# Patient Record
Sex: MALE | Race: WHITE | Employment: OTHER | URBAN - METROPOLITAN AREA
[De-identification: names, ages, dates, MRNs, and addresses within clinical notes are randomized per-mention and may not be internally consistent; named-entity substitution may affect disease eponyms.]

---

## 2017-01-27 ENCOUNTER — OFFICE VISIT (OUTPATIENT)
Dept: INTERNAL MEDICINE CLINIC | Age: 77
End: 2017-01-27

## 2017-01-27 VITALS
HEIGHT: 69 IN | SYSTOLIC BLOOD PRESSURE: 138 MMHG | WEIGHT: 160 LBS | BODY MASS INDEX: 23.7 KG/M2 | DIASTOLIC BLOOD PRESSURE: 80 MMHG | HEART RATE: 64 BPM

## 2017-01-27 DIAGNOSIS — E78.2 MIXED HYPERLIPIDEMIA: ICD-10-CM

## 2017-01-27 DIAGNOSIS — M67.912 DISORDER OF LEFT ROTATOR CUFF: ICD-10-CM

## 2017-01-27 DIAGNOSIS — L82.0 SEBORRHEIC KERATOSES, INFLAMED: ICD-10-CM

## 2017-01-27 DIAGNOSIS — M17.0 PRIMARY OSTEOARTHRITIS OF BOTH KNEES: Primary | ICD-10-CM

## 2017-01-27 PROCEDURE — 1036F TOBACCO NON-USER: CPT | Performed by: INTERNAL MEDICINE

## 2017-01-27 PROCEDURE — 4040F PNEUMOC VAC/ADMIN/RCVD: CPT | Performed by: INTERNAL MEDICINE

## 2017-01-27 PROCEDURE — 99214 OFFICE O/P EST MOD 30 MIN: CPT | Performed by: INTERNAL MEDICINE

## 2017-01-27 PROCEDURE — 20610 DRAIN/INJ JOINT/BURSA W/O US: CPT | Performed by: INTERNAL MEDICINE

## 2017-01-27 PROCEDURE — 1123F ACP DISCUSS/DSCN MKR DOCD: CPT | Performed by: INTERNAL MEDICINE

## 2017-01-27 PROCEDURE — G8598 ASA/ANTIPLAT THER USED: HCPCS | Performed by: INTERNAL MEDICINE

## 2017-01-27 PROCEDURE — 17110 DESTRUCTION B9 LES UP TO 14: CPT | Performed by: INTERNAL MEDICINE

## 2017-01-27 PROCEDURE — G8427 DOCREV CUR MEDS BY ELIG CLIN: HCPCS | Performed by: INTERNAL MEDICINE

## 2017-01-27 PROCEDURE — G8484 FLU IMMUNIZE NO ADMIN: HCPCS | Performed by: INTERNAL MEDICINE

## 2017-01-27 PROCEDURE — 3017F COLORECTAL CA SCREEN DOC REV: CPT | Performed by: INTERNAL MEDICINE

## 2017-01-27 PROCEDURE — G8420 CALC BMI NORM PARAMETERS: HCPCS | Performed by: INTERNAL MEDICINE

## 2017-01-27 RX ORDER — HYDROCODONE BITARTRATE AND ACETAMINOPHEN 5; 325 MG/1; MG/1
TABLET ORAL
Qty: 60 TABLET | Refills: 0 | Status: SHIPPED | OUTPATIENT
Start: 2017-01-27 | End: 2017-04-28 | Stop reason: SDUPTHER

## 2017-01-27 RX ORDER — HYDROCODONE BITARTRATE AND ACETAMINOPHEN 5; 325 MG/1; MG/1
TABLET ORAL
Qty: 60 TABLET | Refills: 0 | Status: SHIPPED | OUTPATIENT
Start: 2017-01-27 | End: 2017-01-27 | Stop reason: SDUPTHER

## 2017-01-27 ASSESSMENT — PATIENT HEALTH QUESTIONNAIRE - PHQ9
2. FEELING DOWN, DEPRESSED OR HOPELESS: 0
1. LITTLE INTEREST OR PLEASURE IN DOING THINGS: 0
SUM OF ALL RESPONSES TO PHQ9 QUESTIONS 1 & 2: 0
SUM OF ALL RESPONSES TO PHQ QUESTIONS 1-9: 0

## 2017-03-08 RX ORDER — CLOPIDOGREL BISULFATE 75 MG/1
TABLET ORAL
Qty: 90 TABLET | Refills: 3 | Status: SHIPPED | OUTPATIENT
Start: 2017-03-08 | End: 2017-12-27 | Stop reason: SDUPTHER

## 2017-04-28 ENCOUNTER — OFFICE VISIT (OUTPATIENT)
Dept: INTERNAL MEDICINE CLINIC | Age: 77
End: 2017-04-28

## 2017-04-28 VITALS
BODY MASS INDEX: 23.7 KG/M2 | HEART RATE: 72 BPM | HEIGHT: 69 IN | SYSTOLIC BLOOD PRESSURE: 156 MMHG | WEIGHT: 160 LBS | DIASTOLIC BLOOD PRESSURE: 80 MMHG

## 2017-04-28 DIAGNOSIS — R25.1 TREMOR: Primary | ICD-10-CM

## 2017-04-28 DIAGNOSIS — G89.29 OTHER CHRONIC PAIN: ICD-10-CM

## 2017-04-28 PROCEDURE — 4040F PNEUMOC VAC/ADMIN/RCVD: CPT | Performed by: INTERNAL MEDICINE

## 2017-04-28 PROCEDURE — 99214 OFFICE O/P EST MOD 30 MIN: CPT | Performed by: INTERNAL MEDICINE

## 2017-04-28 PROCEDURE — 1036F TOBACCO NON-USER: CPT | Performed by: INTERNAL MEDICINE

## 2017-04-28 PROCEDURE — G8598 ASA/ANTIPLAT THER USED: HCPCS | Performed by: INTERNAL MEDICINE

## 2017-04-28 PROCEDURE — G8428 CUR MEDS NOT DOCUMENT: HCPCS | Performed by: INTERNAL MEDICINE

## 2017-04-28 PROCEDURE — 1123F ACP DISCUSS/DSCN MKR DOCD: CPT | Performed by: INTERNAL MEDICINE

## 2017-04-28 PROCEDURE — G8420 CALC BMI NORM PARAMETERS: HCPCS | Performed by: INTERNAL MEDICINE

## 2017-04-28 RX ORDER — HYDROCODONE BITARTRATE AND ACETAMINOPHEN 5; 325 MG/1; MG/1
TABLET ORAL
Qty: 60 TABLET | Refills: 0 | Status: SHIPPED | OUTPATIENT
Start: 2017-04-28 | End: 2017-04-28 | Stop reason: SDUPTHER

## 2017-04-28 RX ORDER — HYDROCODONE BITARTRATE AND ACETAMINOPHEN 5; 325 MG/1; MG/1
TABLET ORAL
Qty: 60 TABLET | Refills: 0 | Status: SHIPPED | OUTPATIENT
Start: 2017-04-28 | End: 2017-07-21 | Stop reason: SDUPTHER

## 2017-05-02 ENCOUNTER — HOSPITAL ENCOUNTER (OUTPATIENT)
Dept: OTHER | Age: 77
Discharge: HOME | End: 2017-05-02
Attending: NURSE PRACTITIONER | Admitting: NURSE PRACTITIONER

## 2017-05-02 ENCOUNTER — TELEPHONE (OUTPATIENT)
Dept: CARDIOLOGY CLINIC | Age: 77
End: 2017-05-02

## 2017-05-02 DIAGNOSIS — I25.10 CORONARY ARTERY DISEASE INVOLVING NATIVE CORONARY ARTERY OF NATIVE HEART WITHOUT ANGINA PECTORIS: Chronic | ICD-10-CM

## 2017-05-02 LAB
ALT SERPL-CCNC: 7 U/L (ref 10–40)
ANION GAP SERPL CALCULATED.3IONS-SCNC: 13 MMOL/L (ref 3–16)
AST SERPL-CCNC: 11 U/L (ref 15–37)
BUN BLDV-MCNC: 15 MG/DL (ref 7–20)
CALCIUM SERPL-MCNC: 8.9 MG/DL (ref 8.3–10.6)
CHLORIDE BLD-SCNC: 102 MMOL/L (ref 99–110)
CHOLESTEROL, TOTAL: 133 MG/DL (ref 0–199)
CO2: 24 MMOL/L (ref 21–32)
CREAT SERPL-MCNC: 0.9 MG/DL (ref 0.8–1.3)
GFR AFRICAN AMERICAN: >60
GFR NON-AFRICAN AMERICAN: >60
GLUCOSE BLD-MCNC: 90 MG/DL (ref 70–99)
HDLC SERPL-MCNC: 43 MG/DL (ref 40–60)
LDL CHOLESTEROL CALCULATED: 73 MG/DL
POTASSIUM SERPL-SCNC: 3.7 MMOL/L (ref 3.5–5.1)
SODIUM BLD-SCNC: 139 MMOL/L (ref 136–145)
TRIGL SERPL-MCNC: 86 MG/DL (ref 0–150)
VLDLC SERPL CALC-MCNC: 17 MG/DL

## 2017-05-04 ENCOUNTER — OFFICE VISIT (OUTPATIENT)
Dept: CARDIOLOGY CLINIC | Age: 77
End: 2017-05-04

## 2017-05-04 VITALS
BODY MASS INDEX: 23.19 KG/M2 | HEIGHT: 70 IN | DIASTOLIC BLOOD PRESSURE: 98 MMHG | SYSTOLIC BLOOD PRESSURE: 148 MMHG | WEIGHT: 162 LBS | HEART RATE: 80 BPM

## 2017-05-04 DIAGNOSIS — I10 ESSENTIAL HYPERTENSION: ICD-10-CM

## 2017-05-04 DIAGNOSIS — E78.5 HYPERLIPIDEMIA, UNSPECIFIED HYPERLIPIDEMIA TYPE: ICD-10-CM

## 2017-05-04 DIAGNOSIS — I25.10 CORONARY ARTERY DISEASE INVOLVING NATIVE CORONARY ARTERY OF NATIVE HEART WITHOUT ANGINA PECTORIS: Primary | ICD-10-CM

## 2017-05-04 DIAGNOSIS — E78.2 MIXED HYPERLIPIDEMIA: ICD-10-CM

## 2017-05-04 PROCEDURE — G8427 DOCREV CUR MEDS BY ELIG CLIN: HCPCS | Performed by: INTERNAL MEDICINE

## 2017-05-04 PROCEDURE — 99214 OFFICE O/P EST MOD 30 MIN: CPT | Performed by: INTERNAL MEDICINE

## 2017-05-04 PROCEDURE — 1123F ACP DISCUSS/DSCN MKR DOCD: CPT | Performed by: INTERNAL MEDICINE

## 2017-05-04 PROCEDURE — G8598 ASA/ANTIPLAT THER USED: HCPCS | Performed by: INTERNAL MEDICINE

## 2017-05-04 PROCEDURE — 4040F PNEUMOC VAC/ADMIN/RCVD: CPT | Performed by: INTERNAL MEDICINE

## 2017-05-04 PROCEDURE — 1036F TOBACCO NON-USER: CPT | Performed by: INTERNAL MEDICINE

## 2017-05-04 PROCEDURE — G8420 CALC BMI NORM PARAMETERS: HCPCS | Performed by: INTERNAL MEDICINE

## 2017-06-28 ENCOUNTER — TELEPHONE (OUTPATIENT)
Dept: INTERNAL MEDICINE CLINIC | Age: 77
End: 2017-06-28

## 2017-06-30 ENCOUNTER — OFFICE VISIT (OUTPATIENT)
Dept: INTERNAL MEDICINE CLINIC | Age: 77
End: 2017-06-30

## 2017-06-30 VITALS
DIASTOLIC BLOOD PRESSURE: 82 MMHG | BODY MASS INDEX: 21.95 KG/M2 | SYSTOLIC BLOOD PRESSURE: 140 MMHG | WEIGHT: 153 LBS | HEART RATE: 76 BPM

## 2017-06-30 DIAGNOSIS — M17.0 PRIMARY OSTEOARTHRITIS OF BOTH KNEES: ICD-10-CM

## 2017-06-30 DIAGNOSIS — M25.061 HEMARTHROSIS OF KNEE, RIGHT: Primary | ICD-10-CM

## 2017-06-30 PROCEDURE — 99213 OFFICE O/P EST LOW 20 MIN: CPT | Performed by: INTERNAL MEDICINE

## 2017-06-30 PROCEDURE — G8598 ASA/ANTIPLAT THER USED: HCPCS | Performed by: INTERNAL MEDICINE

## 2017-06-30 PROCEDURE — 1123F ACP DISCUSS/DSCN MKR DOCD: CPT | Performed by: INTERNAL MEDICINE

## 2017-06-30 PROCEDURE — 20610 DRAIN/INJ JOINT/BURSA W/O US: CPT | Performed by: INTERNAL MEDICINE

## 2017-06-30 PROCEDURE — 1036F TOBACCO NON-USER: CPT | Performed by: INTERNAL MEDICINE

## 2017-06-30 PROCEDURE — G8428 CUR MEDS NOT DOCUMENT: HCPCS | Performed by: INTERNAL MEDICINE

## 2017-06-30 PROCEDURE — 4040F PNEUMOC VAC/ADMIN/RCVD: CPT | Performed by: INTERNAL MEDICINE

## 2017-06-30 PROCEDURE — G8420 CALC BMI NORM PARAMETERS: HCPCS | Performed by: INTERNAL MEDICINE

## 2017-07-21 ENCOUNTER — OFFICE VISIT (OUTPATIENT)
Dept: INTERNAL MEDICINE CLINIC | Age: 77
End: 2017-07-21

## 2017-07-21 VITALS
WEIGHT: 151 LBS | SYSTOLIC BLOOD PRESSURE: 120 MMHG | DIASTOLIC BLOOD PRESSURE: 64 MMHG | BODY MASS INDEX: 21.62 KG/M2 | HEART RATE: 72 BPM | HEIGHT: 70 IN

## 2017-07-21 DIAGNOSIS — G89.29 OTHER CHRONIC PAIN: ICD-10-CM

## 2017-07-21 DIAGNOSIS — M17.11 PRIMARY OSTEOARTHRITIS OF RIGHT KNEE: Primary | ICD-10-CM

## 2017-07-21 DIAGNOSIS — I10 ESSENTIAL HYPERTENSION: ICD-10-CM

## 2017-07-21 PROCEDURE — 4040F PNEUMOC VAC/ADMIN/RCVD: CPT | Performed by: INTERNAL MEDICINE

## 2017-07-21 PROCEDURE — G8598 ASA/ANTIPLAT THER USED: HCPCS | Performed by: INTERNAL MEDICINE

## 2017-07-21 PROCEDURE — 99214 OFFICE O/P EST MOD 30 MIN: CPT | Performed by: INTERNAL MEDICINE

## 2017-07-21 PROCEDURE — G8420 CALC BMI NORM PARAMETERS: HCPCS | Performed by: INTERNAL MEDICINE

## 2017-07-21 PROCEDURE — 1036F TOBACCO NON-USER: CPT | Performed by: INTERNAL MEDICINE

## 2017-07-21 PROCEDURE — G8427 DOCREV CUR MEDS BY ELIG CLIN: HCPCS | Performed by: INTERNAL MEDICINE

## 2017-07-21 PROCEDURE — 1123F ACP DISCUSS/DSCN MKR DOCD: CPT | Performed by: INTERNAL MEDICINE

## 2017-07-21 RX ORDER — HYDROCODONE BITARTRATE AND ACETAMINOPHEN 5; 325 MG/1; MG/1
TABLET ORAL
Qty: 60 TABLET | Refills: 0 | Status: SHIPPED | OUTPATIENT
Start: 2017-07-21 | End: 2017-07-21 | Stop reason: SDUPTHER

## 2017-07-21 RX ORDER — HYDROCODONE BITARTRATE AND ACETAMINOPHEN 5; 325 MG/1; MG/1
TABLET ORAL
Qty: 60 TABLET | Refills: 0 | Status: SHIPPED | OUTPATIENT
Start: 2017-07-21 | End: 2017-10-20 | Stop reason: SDUPTHER

## 2017-08-15 ENCOUNTER — OFFICE VISIT (OUTPATIENT)
Dept: INTERNAL MEDICINE CLINIC | Age: 77
End: 2017-08-15

## 2017-08-15 VITALS
BODY MASS INDEX: 21.33 KG/M2 | SYSTOLIC BLOOD PRESSURE: 120 MMHG | DIASTOLIC BLOOD PRESSURE: 62 MMHG | WEIGHT: 149 LBS | HEIGHT: 70 IN | HEART RATE: 64 BPM

## 2017-08-15 DIAGNOSIS — M25.061 HEMARTHROSIS INVOLVING KNEE JOINT, RIGHT: Primary | ICD-10-CM

## 2017-08-15 PROCEDURE — G8598 ASA/ANTIPLAT THER USED: HCPCS | Performed by: INTERNAL MEDICINE

## 2017-08-15 PROCEDURE — G8428 CUR MEDS NOT DOCUMENT: HCPCS | Performed by: INTERNAL MEDICINE

## 2017-08-15 PROCEDURE — 4040F PNEUMOC VAC/ADMIN/RCVD: CPT | Performed by: INTERNAL MEDICINE

## 2017-08-15 PROCEDURE — 20610 DRAIN/INJ JOINT/BURSA W/O US: CPT | Performed by: INTERNAL MEDICINE

## 2017-08-15 PROCEDURE — G8420 CALC BMI NORM PARAMETERS: HCPCS | Performed by: INTERNAL MEDICINE

## 2017-08-15 PROCEDURE — 99213 OFFICE O/P EST LOW 20 MIN: CPT | Performed by: INTERNAL MEDICINE

## 2017-08-15 PROCEDURE — 1036F TOBACCO NON-USER: CPT | Performed by: INTERNAL MEDICINE

## 2017-08-15 PROCEDURE — 1123F ACP DISCUSS/DSCN MKR DOCD: CPT | Performed by: INTERNAL MEDICINE

## 2017-08-22 ENCOUNTER — OFFICE VISIT (OUTPATIENT)
Dept: ORTHOPEDIC SURGERY | Age: 77
End: 2017-08-22

## 2017-08-22 VITALS
BODY MASS INDEX: 20.9 KG/M2 | HEART RATE: 91 BPM | DIASTOLIC BLOOD PRESSURE: 83 MMHG | SYSTOLIC BLOOD PRESSURE: 112 MMHG | WEIGHT: 146 LBS | HEIGHT: 70 IN

## 2017-08-22 DIAGNOSIS — M17.11 PRIMARY OSTEOARTHRITIS OF RIGHT KNEE: Primary | ICD-10-CM

## 2017-08-22 PROCEDURE — 1036F TOBACCO NON-USER: CPT | Performed by: ORTHOPAEDIC SURGERY

## 2017-08-22 PROCEDURE — G8427 DOCREV CUR MEDS BY ELIG CLIN: HCPCS | Performed by: ORTHOPAEDIC SURGERY

## 2017-08-22 PROCEDURE — G8598 ASA/ANTIPLAT THER USED: HCPCS | Performed by: ORTHOPAEDIC SURGERY

## 2017-08-22 PROCEDURE — G8420 CALC BMI NORM PARAMETERS: HCPCS | Performed by: ORTHOPAEDIC SURGERY

## 2017-08-22 PROCEDURE — 73564 X-RAY EXAM KNEE 4 OR MORE: CPT | Performed by: ORTHOPAEDIC SURGERY

## 2017-08-22 PROCEDURE — 4040F PNEUMOC VAC/ADMIN/RCVD: CPT | Performed by: ORTHOPAEDIC SURGERY

## 2017-08-22 PROCEDURE — 1123F ACP DISCUSS/DSCN MKR DOCD: CPT | Performed by: ORTHOPAEDIC SURGERY

## 2017-08-22 PROCEDURE — 99203 OFFICE O/P NEW LOW 30 MIN: CPT | Performed by: ORTHOPAEDIC SURGERY

## 2017-08-22 NOTE — MR AVS SNAPSHOT
After Visit Summary             Mychal Rai   2017 1:00 PM   Office Visit    Description:  Male : 1940   Provider:  Prosper Watson MD   Department:  [facility] and Future Appointments         Below is a list of your follow-up and future appointments. This may not be a complete list as you may have made appointments directly with providers that we are not aware of or your providers may have made some for you. Please call your providers to confirm appointments. It is important to keep your appointments. Please bring your current insurance card, photo ID, co-pay, and all medication bottles to your appointment. If self-pay, payment is expected at the time of service. Your To-Do List     Future Appointments Provider Department Dept Phone    10/20/2017 8:40 AM Thu Mike MD [facility] [phone]    Please arrive 15 minutes prior to appointment, bring photo ID and insurance card. Information from Your Visit        Department     Name Address Phone Fax    [address] [facility] [address]  [address] [address]  [address]  [id number]      You Were Seen for:         Comments    Chronic pain of right knee   [[id number]]         Vital Signs     Blood Pressure Pulse Height Weight Body Mass Index Smoking Status    112/83 91 5' 10\" (1.778 m) 146 lb (66.2 kg) 20.95 kg/m2 Former Smoker         Medications and Orders      Your Current Medications Are              HYDROcodone-acetaminophen (NORCO) 5-325 MG per tablet One tab PO Q8H prn severe pain. Williams Mccormack clopidogrel (PLAVIX) 75 MG tablet TAKE 1 TABLET BY MOUTH EVERY DAY    simvastatin (ZOCOR) 20 MG tablet TAKE 1 TABLET BY MOUTH EVERY EVENING    aspirin 81 MG EC tablet Take 81 mg by mouth daily.         Allergies           No Known Allergies      We Ordered/Performed the following           XR Knee Bilateral Standing     Comments:    Room 7 Additional Information  If you have questions, please contact the physician practice where you receive care. Remember, MyChart is NOT to be used for urgent needs. For medical emergencies, dial 911. For questions regarding your MyChart account call [phone]. If you have a clinical question, please call your doctor's office.

## 2017-10-01 NOTE — PROGRESS NOTES
Date of Encounter: 8/22/2017  [facility] Record Number: [id number]    Chief Complaint   Patient presents with    Knee Pain     right knee pain for years       History of Present Illness:  Marcus Gandhi is a 69 y.o. male here for evaluation of his  right knee. His current symptoms are described below and I reviewed them with him today. He recently underwent a cortisone injection and had his knee drained last week. That seemed to help his pain and bring down to the current level. He's had no joint fluid injections. He does use hydrocodone on her intermittent basis to help with his pain control. He denies any recent falls but the knee does occasionally give out. He feels a grinding sensation especially when he gets up and down from a chair and when he is walking. He is on Plavix due to atrial fibrillation, and history of cardiac stent. He denies fevers or chills. No numbness or tingling that radiates down the leg.     Pain Assessment  Location of Pain: Knee  Location Modifiers: Right  Severity of Pain: 3  Quality of Pain: Aching  Duration of Pain: Persistent  Frequency of Pain: Constant  Date Pain First Started: 08/22/14  Aggravating Factors: Walking, Kneeling, Squatting  Limiting Behavior: Some  Relieving Factors: Nsaids  Result of Injury: No  Work-Related Injury: No  Are there other pain locations you wish to document?: No    Past Medical History:   Diagnosis Date    CAD (coronary artery disease)     [facility]     Coronary artery bypass 1998    Coronary stent 2003    2    Hyperlipidemia     Hypertension     Osteoarthritis     [facility] 2004    Rotator cuff syndrome        Past Surgical History:   Procedure Laterality Date    CARDIAC SURGERY  1998    CATARACT REMOVAL WITH IMPLANT  2015    COLONOSCOPY  2005    CORONARY ANGIOPLASTY  1993    x 2    CORONARY ANGIOPLASTY  2013    x 1 stent    CORONARY ARTERY BYPASS GRAFT 1998, barrett    PROSTATECTOMY  2004    Cured, dr Lili Martinez       Current Outpatient Prescriptions   Medication Sig Dispense Refill    HYDROcodone-acetaminophen ([address]) 5-325 MG per tablet One tab PO Q8H prn severe pain. . 60 tablet 0    clopidogrel (PLAVIX) 75 MG tablet TAKE 1 TABLET BY MOUTH EVERY DAY 90 tablet 3    simvastatin (ZOCOR) 20 MG tablet TAKE 1 TABLET BY MOUTH EVERY EVENING 90 tablet 3    aspirin 81 MG EC tablet Take 81 mg by mouth daily. No current facility-administered medications for this visit. allergies, social and family histories were reviewed and updated as appropriate. Review of Systems:  Relevant review of systems reviewed and available in the patient's chart    Vital Signs:  /83  Pulse 91  Ht 5' 10\" (1.778 m)  Wt 146 lb (66.2 kg)  BMI 20.95 kg/m2    General Exam:   Constitutional: He is adequately groomed with no evidence of malnutrition  Mental Status: He is oriented to time, place and person. Normal mentation and affect for age. Lymphatic: The lymphatic examination bilaterally reveals all areas to be without enlargement or induration. Vascular: Examination reveals no swelling or calf tenderness. Peripheral pulses are palpable and 2+. Neurological: He has good coordination. There is no focal weakness or sensory deficit. Right Knee Examination:    Inspection:  Both knees show a tendency towards a valgus habitus. Right knee shows trace effusion with no erythema. No scars or abrasions. Palpation:  Right knee shows mild tenderness along the joint line more laterally than medially. There is palpable crepitation noted with range of motion. Range of Motion:  He can achieve full extension although the last 5° are uncomfortable, flexion becomes painful around 115° limiting motion. Strength:  Quad 4+ / 5  ; Hamstrings 4+ / 5.   Gross motor to hip and ankle intact 4+ to 5/5    Special Tests:      negative anterior drawer    no posterior drawer    does not open to valgus or varus stress at 0 or 30°    negative Homans    Posterior tibial pulses are +2/4 capillary refill is brisk sensation is intact. Skin: There are no rashes, ulcerations or lesions. Gait: He ambulates with a limp when he 1st gets up from a chair and utilizes both hands to help get up from a chair. Radiology:     X-rays obtained today and reviewed in office:  Views 4 Right  Knee with comparison AP, flexion PA and skyline views of the left knee  Impression No evidence for acute fracture or subluxation / dislocation. Both knees show bone-on-bone arthritic changes primarily in the lateral compartment with tricompartmental osteophyte formation. No lytic or blastic lesions. Both knees show a mild valgus habitus. Impression:  Encounter Diagnosis   Name Primary?  Primary osteoarthritis of right knee Yes       Office Procedures:  Orders Placed This Encounter   Procedures    XR Knee Bilateral Standing     Room 7     Order Specific Question:   Reason for exam:     Answer:   Knee Pain    XR Knee Right Standard     3V Rt Knee AP Standing     Order Specific Question:   Reason for exam:     Answer:   Knee Pain       Treatment Plan:   We discussed treatment options. At this point he seems to be getting good benefit from his current cortisone injection and arthrocentesis. We discussed the option of Visco supplementation once the cortisone injection wears off. He will give us a call when this occurs and we will get him in for aspiration and Synvisc 1 injection. Any narcotic medications for this will need to continue to come from his primary care physician.     Gabe Van understands and accepts this course of care

## 2017-10-20 ENCOUNTER — OFFICE VISIT (OUTPATIENT)
Dept: INTERNAL MEDICINE CLINIC | Age: 77
End: 2017-10-20

## 2017-10-20 VITALS
BODY MASS INDEX: 21.38 KG/M2 | WEIGHT: 149 LBS | DIASTOLIC BLOOD PRESSURE: 60 MMHG | HEART RATE: 64 BPM | SYSTOLIC BLOOD PRESSURE: 122 MMHG

## 2017-10-20 DIAGNOSIS — R13.10 DYSPHAGIA, UNSPECIFIED TYPE: Primary | ICD-10-CM

## 2017-10-20 DIAGNOSIS — J42 CHRONIC BRONCHITIS, UNSPECIFIED CHRONIC BRONCHITIS TYPE (HCC): ICD-10-CM

## 2017-10-20 DIAGNOSIS — G89.4 CHRONIC PAIN SYNDROME: ICD-10-CM

## 2017-10-20 DIAGNOSIS — I10 ESSENTIAL HYPERTENSION: ICD-10-CM

## 2017-10-20 PROCEDURE — G8420 CALC BMI NORM PARAMETERS: HCPCS | Performed by: INTERNAL MEDICINE

## 2017-10-20 PROCEDURE — G8427 DOCREV CUR MEDS BY ELIG CLIN: HCPCS | Performed by: INTERNAL MEDICINE

## 2017-10-20 PROCEDURE — G8598 ASA/ANTIPLAT THER USED: HCPCS | Performed by: INTERNAL MEDICINE

## 2017-10-20 PROCEDURE — 1123F ACP DISCUSS/DSCN MKR DOCD: CPT | Performed by: INTERNAL MEDICINE

## 2017-10-20 PROCEDURE — 3023F SPIROM DOC REV: CPT | Performed by: INTERNAL MEDICINE

## 2017-10-20 PROCEDURE — 1036F TOBACCO NON-USER: CPT | Performed by: INTERNAL MEDICINE

## 2017-10-20 PROCEDURE — G8484 FLU IMMUNIZE NO ADMIN: HCPCS | Performed by: INTERNAL MEDICINE

## 2017-10-20 PROCEDURE — G8926 SPIRO NO PERF OR DOC: HCPCS | Performed by: INTERNAL MEDICINE

## 2017-10-20 PROCEDURE — 99214 OFFICE O/P EST MOD 30 MIN: CPT | Performed by: INTERNAL MEDICINE

## 2017-10-20 PROCEDURE — 4040F PNEUMOC VAC/ADMIN/RCVD: CPT | Performed by: INTERNAL MEDICINE

## 2017-10-20 RX ORDER — HYDROCODONE BITARTRATE AND ACETAMINOPHEN 5; 325 MG/1; MG/1
TABLET ORAL
Qty: 60 TABLET | Refills: 0 | Status: SHIPPED | OUTPATIENT
Start: 2017-10-20 | End: 2018-01-19 | Stop reason: SDUPTHER

## 2017-10-20 RX ORDER — HYDROCODONE BITARTRATE AND ACETAMINOPHEN 5; 325 MG/1; MG/1
TABLET ORAL
Qty: 60 TABLET | Refills: 0 | Status: SHIPPED | OUTPATIENT
Start: 2017-10-20 | End: 2017-10-20 | Stop reason: SDUPTHER

## 2017-10-20 NOTE — PROGRESS NOTES
Chief Complaint   Patient presents with    Dysphagia     chokes when he eats and food gets stuck     Chronic Pain       Carlota Patrick 69 y.o. male is here for follow-up of Chronic pain, hyperlipidemia and arthritis    He is generally well satisfied with his level of pain control    Today he complains of problems with swallowing. This occurs more with solids and liquids    Apparently he had some type of evaluation done around 7 years ago, it sounds like he may have seen a speech pathologist then since he was advised to use a straw and there were several other recommendations made. I reviewed his current record and I don't see previous swelling of evaluation    His symptoms are worsening, there are no other associated symptoms or modifying factors    He has a chronic tremor in his right arm which he believes is getting worse   Current Outpatient Prescriptions on File Prior to Visit   Medication Sig Dispense Refill    clopidogrel (PLAVIX) 75 MG tablet TAKE 1 TABLET BY MOUTH EVERY DAY 90 tablet 3    simvastatin (ZOCOR) 20 MG tablet TAKE 1 TABLET BY MOUTH EVERY EVENING 90 tablet 3    aspirin 81 MG EC tablet Take 81 mg by mouth daily. No current facility-administered medications on file prior to visit.         Past Medical History:   Diagnosis Date    CAD (coronary artery disease)     [facility]     Coronary artery bypass 1998    Coronary stent 2003    2    Hyperlipidemia     Hypertension     Osteoarthritis     [facility]    Rotator cuff syndrome            /60   Pulse 64   Wt 149 lb (67.6 kg)   BMI 21.38 kg/m²     General Appearance:  Alert, cooperative, no distress, appears stated age   Head:  Normocephalic, without obvious abnormality, atraumatic   Neck: Supple, symmetrical, trachea midline, no adenopathy, thyroid: not enlarged, symmetric, no tenderness/mass/nodules, no carotid bruit or JVD   Lungs:   Clear to auscultation

## 2017-10-24 ENCOUNTER — TELEPHONE (OUTPATIENT)
Dept: INTERNAL MEDICINE CLINIC | Age: 77
End: 2017-10-24

## 2017-10-24 RX ORDER — AZITHROMYCIN 250 MG/1
TABLET, FILM COATED ORAL
Qty: 1 PACKET | Refills: 0 | Status: SHIPPED | OUTPATIENT
Start: 2017-10-24 | End: 2017-11-03

## 2017-10-24 NOTE — TELEPHONE ENCOUNTER
Wife calling-pt started with sore throat yesterday also pain in his right arm. Sore throat and pain are worse today. Arm pain is not new but worse than usual.    Wife state he usually get a zpak and it takes care of it.     [facility] [phone]

## 2017-11-10 ENCOUNTER — TELEPHONE (OUTPATIENT)
Dept: CARDIOLOGY CLINIC | Age: 77
End: 2017-11-10

## 2017-11-10 NOTE — TELEPHONE ENCOUNTER
CARDIAC CLEARANCE     What type of procedure are you having? EGD    Which physician is performing your procedure? Dr. Elba Earl    When is your procedure scheduled for? 11/28/17    Where are you having this procedure? [facility]    Are you taking Blood Thinners? yes   If so what? (Name/dose/frequesncy)clopidogrel (PLAVIX) 75 MG tablet     Does the surgeon want you to stop your blood thinner?   If so for how long? 7 day hold prior    Phone Number and Contact Name for Physicians office:  Kylie Earl  Fax number to send information:  [phone]

## 2017-11-15 ENCOUNTER — TELEPHONE (OUTPATIENT)
Dept: CARDIOLOGY CLINIC | Age: 77
End: 2017-11-15

## 2017-11-15 NOTE — TELEPHONE ENCOUNTER
Pt wife calling needs to know if pt needs to hold ASA prior to EDG on 11/28/17? If so, how many days prior?  Pls call to advise Thank you

## 2017-11-28 ENCOUNTER — HOSPITAL ENCOUNTER (OUTPATIENT)
Dept: ENDOSCOPY | Age: 77
Discharge: HOME | End: 2017-11-28
Attending: INTERNAL MEDICINE | Admitting: INTERNAL MEDICINE

## 2017-11-28 ASSESSMENT — COPD QUESTIONNAIRES: CAT_SEVERITY: MILD

## 2017-11-28 ASSESSMENT — ENCOUNTER SYMPTOMS: SHORTNESS OF BREATH: 1

## 2017-11-28 NOTE — ANESTHESIA PRE-OP
syndrome        Past Surgical History:        Procedure Laterality Date    CARDIAC SURGERY  1998    CATARACT REMOVAL WITH IMPLANT  2015    COLONOSCOPY  2005   Tito [address]    x 2    CORONARY ANGIOPLASTY  2013    x 1 stent    CORONARY ARTERY BYPASS GRAFT      1998, barrett    PROSTATECTOMY  2004    Cured, dr Joanne Branch       Social History:    Social History   Substance Use Topics    Smoking status: Former Smoker     Packs/day: 2.00     Years: 30.00     Quit date: 5/15/1983    Smokeless tobacco: Never Used    Alcohol use No                                Counseling given: Not Answered      Vital Signs (Current): There were no vitals filed for this visit. BP Readings from Last 3 Encounters:   10/20/17 122/60   08/22/17 112/83   08/15/17 120/62       NPO Status:                                                                                 BMI:   Wt Readings from Last 3 Encounters:   11/14/17 148 lb (67.1 kg)   10/20/17 149 lb (67.6 kg)   08/22/17 146 lb (66.2 kg)     There is no height or weight on file to calculate BMI.     Anesthesia Evaluation  Patient summary reviewed and Nursing notes reviewed  Airway: Mallampati: II  TM distance: >3 FB   Neck ROM: limited  Mouth opening: > = 3 FB Dental:    (+) other  Comment: Missing lower incisors    Pulmonary:   (+) COPD: mild,  shortness of breath: chronic,                             Cardiovascular:  Exercise tolerance: good (>4 METS),   (+) hypertension:, CAD: non-obstructive,     (-)  LORENZ      Rhythm: regular                   ROS comment: See stress test result     Neuro/Psych:               GI/Hepatic/Renal:             Endo/Other:                     Abdominal:           Vascular:                                      Anesthesia Plan      MAC     ASA 3     (ED to Hosp-Admission  Discharged      5/18/2015 - 5/20/2015 (2 days)  Lam Ruiz MD   Last attending  Treatment team  SOB (shortness of breath)   Principal problem   Discharge Summaries   Ana Whiting MD (Physician) [facility]        PATIENT NAME:                 PA #:            MR Chase Valera                   [id number]       [id number]            ATTENDING PHYSICIAN:                  ADM DATE:   DIS DATE:          Radha Murray MD              05/18/2015 05/20/2015         DATE OF BIRTH:   AGE:           PATIENT TYPE:                         1940       75             IPF                                      FINAL DIAGNOSES:    1. Chest pain, undetermined etiology. 2.  Shortness of breath secondary to chronic obstructive pulmonary disease. 3.  Coronary artery disease. 4.  Hypertension. 5.  Hyperlipidemia. DISCHARGE MEDICATIONS:  Include:  1. Protonix 40 mg daily. 2.  Zocor 20 mg daily. 3.  Hydrocodone/acetaminophen 5/325 every 6 hours as needed for severe pain. 4.  Plavix 75 mg daily. 5.  Aspirin 81 mg daily. HOSPITAL COURSE:  The patient presented to the hospital with vague  retrosternal chest discomfort. The patient had a great deal of difficulty  describing the nature of the pain. He complained of severe shortness of  breath. He had a minimally elevated troponin on admission. Therefore, it  was felt that the patient should have a workup to rule out an acute coronary  event. The patient had a CT pulmonary angiogram to make sure he did not have  pulmonary emboli causing his pain. This was negative. The cardiac enzymes  never became particularly high. He had an echocardiogram performed which  showed normal left ventricle size, wall thickness, and systolic function with  an ejection fraction of 55%. He did have some impaired left ventricular  relaxation. He had some trivial mitral regurgitation. He had an aneurysmal  interatrial septum with no evidence of shunting. The patient had trivial  tricuspid regurgitation.   He was seen in consultation by

## 2017-12-13 ENCOUNTER — HOSPITAL ENCOUNTER (OUTPATIENT)
Dept: GENERAL RADIOLOGY | Age: 77
Discharge: HOME | End: 2017-12-13
Admitting: INTERNAL MEDICINE

## 2017-12-13 DIAGNOSIS — R13.10 PROBLEMS WITH SWALLOWING AND MASTICATION: ICD-10-CM

## 2017-12-13 DIAGNOSIS — R13.10 DYSPHAGIA: ICD-10-CM

## 2017-12-13 NOTE — PROCEDURES
[address]  MODIFIED BARIUM SWALLOW EVALUATION    Patient's Name: Edin Morales. O.B: 1940  Medical Diagnosis: Problems with swallowing and mastication [R13.10]  Treatment Diagnosis: Dysphagia    Ordering MD: Dr. Angeli Ashley  Radiologist: Dr. Nicole Jacobo  Date of Evaluation: 12/13/2017  Type of Study: Modified Barium Swallowing Study (MBS)  Diet Prior to Study:  Regular texture diet with thin liquids  Pain Level: Pt did not report pain    Impression:  Modified Barium Swallow evaluation completed on 12/13/2017. Results indicate moderate-severe oropharyngeal dysphagia characterized by prolonged mastication, reduced A-P propulsion, premature bolus loss to pharynx, delayed swallow initiation, reduced hyolaryngeal excursion, reduced/absent epiglottic movement, reduced pharyngeal clearing, and GROSS aspiration of thin liquids and nectar thick liquids. Thin liquids via cup revealed rapid bolus loss to pyriforms, largely ABSENT epiglottic distension resulting in significantly reduced airway protection with direct entry into airway. A cough reflex was elicited with gross aspiration episode. Nectar thick liquids via cup revealed use of multiple swallows with one instance of aspiration during the swallow. With use of chin tuck strategy, mild laryngeal penetration was noted with nectar thick liquids. No aspiration or penetration was viewed with honey thick liquids, however vallecular residue was observed post swallow. Reduced pharyngeal clearing was observed across all textures, suspected due to significantly reduced epiglottic distension and epiglottis making contact with post-pharyngeal wall. Given verbal cues to use chin tuck with second dry swallow, this appeared to assist with pharyngeal clearing however did not eliminate residue. Solid textures revealed prolonged mastication with piecemeal swallows, again reduced pharyngeal clearing was observed with coating noted on post-pharyngeal wall.   At attempts to clear pharyngeal residue     Esophageal Phase  Unremarkable    Following Evaluation:  Results/recommendations and education given to Patient and his wife, who verbalized understanding    Timed Code Treatment: 0 minutes    Total Treatment Time: 40 minutes    Toma Phillips M.A., [address] [place] [address]  Speech-Language Pathologist

## 2017-12-14 ENCOUNTER — HOSPITAL ENCOUNTER (OUTPATIENT)
Dept: ENDOSCOPY | Age: 77
Discharge: HOME | End: 2017-12-14
Attending: INTERNAL MEDICINE | Admitting: INTERNAL MEDICINE

## 2017-12-27 ENCOUNTER — TELEPHONE (OUTPATIENT)
Dept: CARDIOLOGY CLINIC | Age: 77
End: 2017-12-27

## 2017-12-27 DIAGNOSIS — I25.10 CORONARY ARTERY DISEASE INVOLVING NATIVE CORONARY ARTERY OF NATIVE HEART WITHOUT ANGINA PECTORIS: Chronic | ICD-10-CM

## 2017-12-27 DIAGNOSIS — I10 ESSENTIAL HYPERTENSION: Chronic | ICD-10-CM

## 2017-12-27 DIAGNOSIS — E78.5 HYPERLIPIDEMIA, UNSPECIFIED HYPERLIPIDEMIA TYPE: Primary | Chronic | ICD-10-CM

## 2017-12-27 DIAGNOSIS — E78.5 HYPERLIPIDEMIA, UNSPECIFIED HYPERLIPIDEMIA TYPE: Chronic | ICD-10-CM

## 2017-12-27 RX ORDER — SIMVASTATIN 20 MG
20 TABLET ORAL NIGHTLY
Qty: 90 TABLET | Refills: 3 | Status: SHIPPED | OUTPATIENT
Start: 2017-12-27 | End: 2019-01-02 | Stop reason: SDUPTHER

## 2017-12-27 RX ORDER — CLOPIDOGREL BISULFATE 75 MG/1
75 TABLET ORAL DAILY
Qty: 90 TABLET | Refills: 3 | Status: SHIPPED | OUTPATIENT
Start: 2017-12-27 | End: 2019-01-02

## 2017-12-27 RX ORDER — SIMVASTATIN 20 MG
20 TABLET ORAL NIGHTLY
Qty: 90 TABLET | Refills: 3 | Status: SHIPPED | OUTPATIENT
Start: 2017-12-27 | End: 2017-12-27 | Stop reason: SDUPTHER

## 2017-12-27 NOTE — TELEPHONE ENCOUNTER
Pt calling to adv that all his prescription should now be sent to [facility]

## 2018-01-19 ENCOUNTER — OFFICE VISIT (OUTPATIENT)
Dept: INTERNAL MEDICINE CLINIC | Age: 78
End: 2018-01-19

## 2018-01-19 VITALS
DIASTOLIC BLOOD PRESSURE: 78 MMHG | HEART RATE: 64 BPM | SYSTOLIC BLOOD PRESSURE: 144 MMHG | BODY MASS INDEX: 22.1 KG/M2 | WEIGHT: 154 LBS

## 2018-01-19 DIAGNOSIS — L98.9 SKIN LESION OF CHEST WALL: ICD-10-CM

## 2018-01-19 DIAGNOSIS — L29.9 PRURITUS: ICD-10-CM

## 2018-01-19 DIAGNOSIS — G89.4 CHRONIC PAIN DISORDER: Primary | ICD-10-CM

## 2018-01-19 PROCEDURE — G8427 DOCREV CUR MEDS BY ELIG CLIN: HCPCS | Performed by: INTERNAL MEDICINE

## 2018-01-19 PROCEDURE — 1036F TOBACCO NON-USER: CPT | Performed by: INTERNAL MEDICINE

## 2018-01-19 PROCEDURE — 99213 OFFICE O/P EST LOW 20 MIN: CPT | Performed by: INTERNAL MEDICINE

## 2018-01-19 PROCEDURE — 4040F PNEUMOC VAC/ADMIN/RCVD: CPT | Performed by: INTERNAL MEDICINE

## 2018-01-19 PROCEDURE — G8420 CALC BMI NORM PARAMETERS: HCPCS | Performed by: INTERNAL MEDICINE

## 2018-01-19 PROCEDURE — 11200 RMVL SKIN TAGS UP TO&INC 15: CPT | Performed by: INTERNAL MEDICINE

## 2018-01-19 PROCEDURE — 1123F ACP DISCUSS/DSCN MKR DOCD: CPT | Performed by: INTERNAL MEDICINE

## 2018-01-19 PROCEDURE — G8484 FLU IMMUNIZE NO ADMIN: HCPCS | Performed by: INTERNAL MEDICINE

## 2018-01-19 PROCEDURE — G8598 ASA/ANTIPLAT THER USED: HCPCS | Performed by: INTERNAL MEDICINE

## 2018-01-19 RX ORDER — HYDROCODONE BITARTRATE AND ACETAMINOPHEN 5; 325 MG/1; MG/1
TABLET ORAL
Qty: 60 TABLET | Refills: 0 | Status: SHIPPED | OUTPATIENT
Start: 2018-01-19 | End: 2018-01-19 | Stop reason: SDUPTHER

## 2018-01-19 RX ORDER — HYDROCODONE BITARTRATE AND ACETAMINOPHEN 5; 325 MG/1; MG/1
TABLET ORAL
Qty: 60 TABLET | Refills: 0 | Status: SHIPPED | OUTPATIENT
Start: 2018-02-18 | End: 2018-01-19 | Stop reason: SDUPTHER

## 2018-01-19 RX ORDER — HYDROCODONE BITARTRATE AND ACETAMINOPHEN 5; 325 MG/1; MG/1
TABLET ORAL
Qty: 60 TABLET | Refills: 0 | Status: SHIPPED | OUTPATIENT
Start: 2018-03-20 | End: 2018-04-19

## 2018-01-19 NOTE — PROGRESS NOTES
Chief Complaint   Patient presents with    Chronic Pain     med refill    Other     check cyst under right arm       Sharda Kayd 69 y.o. male is here for follow-up of Chronic pain, hyperlipidemia and arthritis   He has a pruritic skin lesion that he would like treated     se   Current Outpatient Prescriptions on File Prior to Visit   Medication Sig Dispense Refill    simvastatin (ZOCOR) 20 MG tablet Take 1 tablet by mouth nightly 90 tablet 3    clopidogrel (PLAVIX) 75 MG tablet Take 1 tablet by mouth daily 90 tablet 3    aspirin 81 MG EC tablet Take 81 mg by mouth daily. No current facility-administered medications on file prior to visit.         Past Medical History:   Diagnosis Date    CAD (coronary artery disease)     [facility]     Coronary artery bypass 1998    Coronary stent 2003    2    Hyperlipidemia     Hypertension     Osteoarthritis     Pro[facility] cancer ([facility] 2004    Rotator cuff syndrome            BP (!) 144/78   Pulse 64   Wt 154 lb (69.9 kg)   BMI 22.10 kg/m²     General Appearance:  Alert, cooperative, no distress, appears stated age    Skin:  Skin tag right side of chest in the posterior axillary line                                       No components found for: CHLPL  Lab Results   Component Value Date    TRIG 86 05/02/2017    TRIG 98 10/26/2016    TRIG 102 03/24/2016     Lab Results   Component Value Date    HDL 43 05/02/2017    HDL 47 10/26/2016    HDL 43 03/24/2016     Lab Results   Component Value Date    LDLCALC 73 05/02/2017    LDLCALC 79 10/26/2016    LDLCALC 89 03/24/2016     Lab Results   Component Value Date    LABVLDL 17 05/02/2017    LABVLDL 20 10/26/2016    LABVLDL 20 03/24/2016     Lab Results   Component Value Date    CREATININE 0.9 05/02/2017         ASSESSMENT/PLAN[de-identified]   R Pruritic skin lesion treated with cryotherapy without complication    Controlled Substances Monitoring:     Attestation: The Prescription Monitoring Report for this patient was reviewed today. Anaid Stokes MD)  Documentation: No signs of potential drug abuse or diversion identified. Anaid Stokes MD)  3 months prescription provided         1. Chronic pain disorder  HYDROcodone-acetaminophen (NORCO) 5-325 MG per tablet    DISCONTINUED: HYDROcodone-acetaminophen (NORCO) 5-325 MG per tablet    DISCONTINUED: HYDROcodone-acetaminophen (NORCO) 5-325 MG per tablet   2. Skin lesion of chest wall  08806 - [place] REMOVAL OF SKIN TAGS, UP TO 15   3.  Pruritus   96878 - [place] REMOVAL OF SKIN TAGS, UP TO 15

## 2018-02-12 ENCOUNTER — TELEPHONE (OUTPATIENT)
Dept: INTERNAL MEDICINE CLINIC | Age: 78
End: 2018-02-12

## 2018-02-12 RX ORDER — AZITHROMYCIN 250 MG/1
TABLET, FILM COATED ORAL
Qty: 1 PACKET | Refills: 0 | Status: SHIPPED | OUTPATIENT
Start: 2018-02-12 | End: 2018-02-22

## 2018-02-12 NOTE — TELEPHONE ENCOUNTER
Pt's wife is calling stating her  has a horrible cough. Has had cough for 5 days. He is coughing up yellow mucus. His ribs and chest are hurting due to coughing. Pt does not have a fever. They are asking for antibiotic. They were offered an apt and wanted to see if Dr Rahat Plasencia would call something in.

## 2018-03-31 ENCOUNTER — HOSPITAL ENCOUNTER (OUTPATIENT)
Dept: OTHER | Age: 78
Discharge: HOME | End: 2018-03-31
Attending: INTERNAL MEDICINE | Admitting: INTERNAL MEDICINE

## 2018-03-31 DIAGNOSIS — I10 ESSENTIAL HYPERTENSION: ICD-10-CM

## 2018-03-31 DIAGNOSIS — E78.5 HYPERLIPIDEMIA, UNSPECIFIED HYPERLIPIDEMIA TYPE: ICD-10-CM

## 2018-03-31 DIAGNOSIS — E78.2 MIXED HYPERLIPIDEMIA: ICD-10-CM

## 2018-03-31 DIAGNOSIS — I25.10 CORONARY ARTERY DISEASE INVOLVING NATIVE CORONARY ARTERY OF NATIVE HEART WITHOUT ANGINA PECTORIS: ICD-10-CM

## 2018-03-31 LAB
A/G RATIO: 1.6 (ref 1.1–2.2)
ALBUMIN SERPL-MCNC: 4.1 G/DL (ref 3.4–5)
ALP BLD-CCNC: 91 U/L (ref 40–129)
ALT SERPL-CCNC: 7 U/L (ref 10–40)
ANION GAP SERPL CALCULATED.3IONS-SCNC: 15 MMOL/L (ref 3–16)
AST SERPL-CCNC: 10 U/L (ref 15–37)
BILIRUB SERPL-MCNC: 0.3 MG/DL (ref 0–1)
BUN BLDV-MCNC: 16 MG/DL (ref 7–20)
CALCIUM SERPL-MCNC: 9.1 MG/DL (ref 8.3–10.6)
CHLORIDE BLD-SCNC: 101 MMOL/L (ref 99–110)
CHOLESTEROL, TOTAL: 136 MG/DL (ref 0–199)
CO2: 25 MMOL/L (ref 21–32)
CREAT SERPL-MCNC: 0.8 MG/DL (ref 0.8–1.3)
GFR AFRICAN AMERICAN: >60
GFR NON-AFRICAN AMERICAN: >60
GLOBULIN: 2.6 G/DL
GLUCOSE BLD-MCNC: 87 MG/DL (ref 70–99)
HDLC SERPL-MCNC: 47 MG/DL (ref 40–60)
LDL CHOLESTEROL CALCULATED: 70 MG/DL
POTASSIUM SERPL-SCNC: 3.9 MMOL/L (ref 3.5–5.1)
SODIUM BLD-SCNC: 141 MMOL/L (ref 136–145)
TOTAL PROTEIN: 6.7 G/DL (ref 6.4–8.2)
TRIGL SERPL-MCNC: 96 MG/DL (ref 0–150)
VLDLC SERPL CALC-MCNC: 19 MG/DL

## 2018-04-03 ENCOUNTER — OFFICE VISIT (OUTPATIENT)
Dept: CARDIOLOGY CLINIC | Age: 78
End: 2018-04-03

## 2018-04-03 VITALS
OXYGEN SATURATION: 99 % | BODY MASS INDEX: 21.92 KG/M2 | HEART RATE: 85 BPM | HEIGHT: 70 IN | SYSTOLIC BLOOD PRESSURE: 120 MMHG | WEIGHT: 153.1 LBS | DIASTOLIC BLOOD PRESSURE: 80 MMHG

## 2018-04-03 DIAGNOSIS — E78.5 HYPERLIPIDEMIA, UNSPECIFIED HYPERLIPIDEMIA TYPE: Chronic | ICD-10-CM

## 2018-04-03 DIAGNOSIS — I25.10 CORONARY ARTERY DISEASE INVOLVING NATIVE CORONARY ARTERY OF NATIVE HEART WITHOUT ANGINA PECTORIS: Primary | Chronic | ICD-10-CM

## 2018-04-03 DIAGNOSIS — R06.02 SOB (SHORTNESS OF BREATH): ICD-10-CM

## 2018-04-03 DIAGNOSIS — I10 ESSENTIAL HYPERTENSION: Chronic | ICD-10-CM

## 2018-04-03 DIAGNOSIS — I48.0 PAROXYSMAL ATRIAL FIBRILLATION (HCC): Chronic | ICD-10-CM

## 2018-04-03 PROCEDURE — G8427 DOCREV CUR MEDS BY ELIG CLIN: HCPCS | Performed by: INTERNAL MEDICINE

## 2018-04-03 PROCEDURE — 99214 OFFICE O/P EST MOD 30 MIN: CPT | Performed by: INTERNAL MEDICINE

## 2018-04-03 PROCEDURE — G8420 CALC BMI NORM PARAMETERS: HCPCS | Performed by: INTERNAL MEDICINE

## 2018-04-03 PROCEDURE — 4040F PNEUMOC VAC/ADMIN/RCVD: CPT | Performed by: INTERNAL MEDICINE

## 2018-04-03 PROCEDURE — 1123F ACP DISCUSS/DSCN MKR DOCD: CPT | Performed by: INTERNAL MEDICINE

## 2018-04-03 PROCEDURE — 1036F TOBACCO NON-USER: CPT | Performed by: INTERNAL MEDICINE

## 2018-04-03 PROCEDURE — G8598 ASA/ANTIPLAT THER USED: HCPCS | Performed by: INTERNAL MEDICINE

## 2018-04-20 ENCOUNTER — OFFICE VISIT (OUTPATIENT)
Dept: INTERNAL MEDICINE CLINIC | Age: 78
End: 2018-04-20

## 2018-04-20 VITALS
DIASTOLIC BLOOD PRESSURE: 80 MMHG | HEART RATE: 76 BPM | SYSTOLIC BLOOD PRESSURE: 136 MMHG | WEIGHT: 150 LBS | BODY MASS INDEX: 21.52 KG/M2

## 2018-04-20 DIAGNOSIS — G89.4 CHRONIC PAIN DISORDER: ICD-10-CM

## 2018-04-20 DIAGNOSIS — R25.1 TREMOR: Primary | ICD-10-CM

## 2018-04-20 DIAGNOSIS — G20 PRIMARY PARKINSONISM (HCC): ICD-10-CM

## 2018-04-20 PROCEDURE — 1036F TOBACCO NON-USER: CPT | Performed by: INTERNAL MEDICINE

## 2018-04-20 PROCEDURE — G8420 CALC BMI NORM PARAMETERS: HCPCS | Performed by: INTERNAL MEDICINE

## 2018-04-20 PROCEDURE — G8427 DOCREV CUR MEDS BY ELIG CLIN: HCPCS | Performed by: INTERNAL MEDICINE

## 2018-04-20 PROCEDURE — 99214 OFFICE O/P EST MOD 30 MIN: CPT | Performed by: INTERNAL MEDICINE

## 2018-04-20 PROCEDURE — G8598 ASA/ANTIPLAT THER USED: HCPCS | Performed by: INTERNAL MEDICINE

## 2018-04-20 PROCEDURE — 1123F ACP DISCUSS/DSCN MKR DOCD: CPT | Performed by: INTERNAL MEDICINE

## 2018-04-20 PROCEDURE — 4040F PNEUMOC VAC/ADMIN/RCVD: CPT | Performed by: INTERNAL MEDICINE

## 2018-04-20 RX ORDER — HYDROCODONE BITARTRATE AND ACETAMINOPHEN 5; 325 MG/1; MG/1
1 TABLET ORAL EVERY 8 HOURS PRN
Qty: 60 TABLET | Refills: 0 | Status: SHIPPED | OUTPATIENT
Start: 2018-06-19 | End: 2018-06-29 | Stop reason: SDUPTHER

## 2018-04-20 RX ORDER — HYDROCODONE BITARTRATE AND ACETAMINOPHEN 5; 325 MG/1; MG/1
1 TABLET ORAL EVERY 8 HOURS PRN
Qty: 60 TABLET | Refills: 0 | Status: SHIPPED | OUTPATIENT
Start: 2018-05-20 | End: 2018-04-20 | Stop reason: SDUPTHER

## 2018-04-20 RX ORDER — HYDROCODONE BITARTRATE AND ACETAMINOPHEN 5; 325 MG/1; MG/1
TABLET ORAL
Qty: 60 TABLET | Refills: 0 | Status: SHIPPED | OUTPATIENT
Start: 2018-04-20 | End: 2018-04-20 | Stop reason: SDUPTHER

## 2018-04-20 ASSESSMENT — PATIENT HEALTH QUESTIONNAIRE - PHQ9
SUM OF ALL RESPONSES TO PHQ9 QUESTIONS 1 & 2: 1
SUM OF ALL RESPONSES TO PHQ QUESTIONS 1-9: 1
1. LITTLE INTEREST OR PLEASURE IN DOING THINGS: 1
2. FEELING DOWN, DEPRESSED OR HOPELESS: 0

## 2018-04-24 ENCOUNTER — TELEPHONE (OUTPATIENT)
Dept: RHEUMATOLOGY | Age: 78
End: 2018-04-24

## 2018-04-25 ENCOUNTER — OFFICE VISIT (OUTPATIENT)
Dept: INTERNAL MEDICINE CLINIC | Age: 78
End: 2018-04-25

## 2018-04-25 VITALS
HEART RATE: 76 BPM | BODY MASS INDEX: 21.52 KG/M2 | DIASTOLIC BLOOD PRESSURE: 80 MMHG | WEIGHT: 150 LBS | SYSTOLIC BLOOD PRESSURE: 120 MMHG

## 2018-04-25 DIAGNOSIS — G20 PRIMARY PARKINSONISM (HCC): Primary | ICD-10-CM

## 2018-04-25 PROCEDURE — 4040F PNEUMOC VAC/ADMIN/RCVD: CPT | Performed by: INTERNAL MEDICINE

## 2018-04-25 PROCEDURE — G8420 CALC BMI NORM PARAMETERS: HCPCS | Performed by: INTERNAL MEDICINE

## 2018-04-25 PROCEDURE — G8598 ASA/ANTIPLAT THER USED: HCPCS | Performed by: INTERNAL MEDICINE

## 2018-04-25 PROCEDURE — G8427 DOCREV CUR MEDS BY ELIG CLIN: HCPCS | Performed by: INTERNAL MEDICINE

## 2018-04-25 PROCEDURE — 1036F TOBACCO NON-USER: CPT | Performed by: INTERNAL MEDICINE

## 2018-04-25 PROCEDURE — 99213 OFFICE O/P EST LOW 20 MIN: CPT | Performed by: INTERNAL MEDICINE

## 2018-04-25 PROCEDURE — 1123F ACP DISCUSS/DSCN MKR DOCD: CPT | Performed by: INTERNAL MEDICINE

## 2018-04-25 RX ORDER — CARBIDOPA AND LEVODOPA 25; 100 MG/1; MG/1
1 TABLET, EXTENDED RELEASE ORAL 2 TIMES DAILY
Qty: 60 TABLET | Refills: 3 | Status: SHIPPED | OUTPATIENT
Start: 2018-04-25 | End: 2018-08-19 | Stop reason: SDUPTHER

## 2018-05-18 ENCOUNTER — OFFICE VISIT (OUTPATIENT)
Dept: INTERNAL MEDICINE CLINIC | Age: 78
End: 2018-05-18

## 2018-05-18 VITALS
HEART RATE: 64 BPM | WEIGHT: 147 LBS | SYSTOLIC BLOOD PRESSURE: 132 MMHG | DIASTOLIC BLOOD PRESSURE: 80 MMHG | BODY MASS INDEX: 21.09 KG/M2

## 2018-05-18 DIAGNOSIS — G20 PRIMARY PARKINSONISM (HCC): Primary | ICD-10-CM

## 2018-05-18 PROCEDURE — G8427 DOCREV CUR MEDS BY ELIG CLIN: HCPCS | Performed by: INTERNAL MEDICINE

## 2018-05-18 PROCEDURE — 1123F ACP DISCUSS/DSCN MKR DOCD: CPT | Performed by: INTERNAL MEDICINE

## 2018-05-18 PROCEDURE — G8420 CALC BMI NORM PARAMETERS: HCPCS | Performed by: INTERNAL MEDICINE

## 2018-05-18 PROCEDURE — G8598 ASA/ANTIPLAT THER USED: HCPCS | Performed by: INTERNAL MEDICINE

## 2018-05-18 PROCEDURE — 1036F TOBACCO NON-USER: CPT | Performed by: INTERNAL MEDICINE

## 2018-05-18 PROCEDURE — 99213 OFFICE O/P EST LOW 20 MIN: CPT | Performed by: INTERNAL MEDICINE

## 2018-05-18 PROCEDURE — 4040F PNEUMOC VAC/ADMIN/RCVD: CPT | Performed by: INTERNAL MEDICINE

## 2018-06-29 ENCOUNTER — OFFICE VISIT (OUTPATIENT)
Dept: INTERNAL MEDICINE CLINIC | Age: 78
End: 2018-06-29

## 2018-06-29 VITALS
BODY MASS INDEX: 21.38 KG/M2 | DIASTOLIC BLOOD PRESSURE: 60 MMHG | WEIGHT: 149 LBS | SYSTOLIC BLOOD PRESSURE: 120 MMHG | HEART RATE: 60 BPM

## 2018-06-29 DIAGNOSIS — G20 PRIMARY PARKINSONISM (HCC): Primary | ICD-10-CM

## 2018-06-29 DIAGNOSIS — G89.4 CHRONIC PAIN DISORDER: ICD-10-CM

## 2018-06-29 PROCEDURE — 1123F ACP DISCUSS/DSCN MKR DOCD: CPT | Performed by: INTERNAL MEDICINE

## 2018-06-29 PROCEDURE — G8598 ASA/ANTIPLAT THER USED: HCPCS | Performed by: INTERNAL MEDICINE

## 2018-06-29 PROCEDURE — 1036F TOBACCO NON-USER: CPT | Performed by: INTERNAL MEDICINE

## 2018-06-29 PROCEDURE — G8420 CALC BMI NORM PARAMETERS: HCPCS | Performed by: INTERNAL MEDICINE

## 2018-06-29 PROCEDURE — 99214 OFFICE O/P EST MOD 30 MIN: CPT | Performed by: INTERNAL MEDICINE

## 2018-06-29 PROCEDURE — G8427 DOCREV CUR MEDS BY ELIG CLIN: HCPCS | Performed by: INTERNAL MEDICINE

## 2018-06-29 PROCEDURE — 4040F PNEUMOC VAC/ADMIN/RCVD: CPT | Performed by: INTERNAL MEDICINE

## 2018-06-29 RX ORDER — HYDROCODONE BITARTRATE AND ACETAMINOPHEN 5; 325 MG/1; MG/1
1 TABLET ORAL EVERY 8 HOURS PRN
Qty: 60 TABLET | Refills: 0 | Status: SHIPPED | OUTPATIENT
Start: 2018-07-18 | End: 2018-06-29 | Stop reason: SDUPTHER

## 2018-06-29 RX ORDER — HYDROCODONE BITARTRATE AND ACETAMINOPHEN 5; 325 MG/1; MG/1
1 TABLET ORAL EVERY 8 HOURS PRN
Qty: 60 TABLET | Refills: 0 | Status: SHIPPED | OUTPATIENT
Start: 2018-08-17 | End: 2018-06-29 | Stop reason: SDUPTHER

## 2018-06-29 RX ORDER — HYDROCODONE BITARTRATE AND ACETAMINOPHEN 5; 325 MG/1; MG/1
1 TABLET ORAL EVERY 8 HOURS PRN
Qty: 60 TABLET | Refills: 0 | Status: SHIPPED | OUTPATIENT
Start: 2018-09-16 | End: 2018-10-05 | Stop reason: SDUPTHER

## 2018-08-20 RX ORDER — CARBIDOPA AND LEVODOPA 25; 100 MG/1; MG/1
TABLET, EXTENDED RELEASE ORAL
Qty: 60 TABLET | Refills: 11 | Status: SHIPPED | OUTPATIENT
Start: 2018-08-20 | End: 2018-08-20 | Stop reason: SDUPTHER

## 2018-08-20 RX ORDER — CARBIDOPA AND LEVODOPA 25; 100 MG/1; MG/1
TABLET, EXTENDED RELEASE ORAL
Qty: 180 TABLET | Refills: 3 | Status: SHIPPED | OUTPATIENT
Start: 2018-08-20 | End: 2019-09-26 | Stop reason: SDUPTHER

## 2018-09-17 ENCOUNTER — TELEPHONE (OUTPATIENT)
Dept: INTERNAL MEDICINE CLINIC | Age: 78
End: 2018-09-17

## 2018-10-05 ENCOUNTER — OFFICE VISIT (OUTPATIENT)
Dept: INTERNAL MEDICINE CLINIC | Age: 78
End: 2018-10-05
Payer: MEDICARE

## 2018-10-05 ENCOUNTER — TELEPHONE (OUTPATIENT)
Dept: CARDIOLOGY CLINIC | Age: 78
End: 2018-10-05

## 2018-10-05 VITALS
WEIGHT: 144 LBS | BODY MASS INDEX: 20.66 KG/M2 | SYSTOLIC BLOOD PRESSURE: 122 MMHG | DIASTOLIC BLOOD PRESSURE: 60 MMHG | HEART RATE: 76 BPM

## 2018-10-05 DIAGNOSIS — G89.4 CHRONIC PAIN DISORDER: ICD-10-CM

## 2018-10-05 DIAGNOSIS — R06.02 SOB (SHORTNESS OF BREATH): ICD-10-CM

## 2018-10-05 DIAGNOSIS — I25.10 CORONARY ARTERY DISEASE INVOLVING NATIVE CORONARY ARTERY OF NATIVE HEART WITHOUT ANGINA PECTORIS: Primary | Chronic | ICD-10-CM

## 2018-10-05 PROCEDURE — 93000 ELECTROCARDIOGRAM COMPLETE: CPT | Performed by: INTERNAL MEDICINE

## 2018-10-05 PROCEDURE — G8427 DOCREV CUR MEDS BY ELIG CLIN: HCPCS | Performed by: INTERNAL MEDICINE

## 2018-10-05 PROCEDURE — G8420 CALC BMI NORM PARAMETERS: HCPCS | Performed by: INTERNAL MEDICINE

## 2018-10-05 PROCEDURE — 1036F TOBACCO NON-USER: CPT | Performed by: INTERNAL MEDICINE

## 2018-10-05 PROCEDURE — 1101F PT FALLS ASSESS-DOCD LE1/YR: CPT | Performed by: INTERNAL MEDICINE

## 2018-10-05 PROCEDURE — G8598 ASA/ANTIPLAT THER USED: HCPCS | Performed by: INTERNAL MEDICINE

## 2018-10-05 PROCEDURE — 99214 OFFICE O/P EST MOD 30 MIN: CPT | Performed by: INTERNAL MEDICINE

## 2018-10-05 PROCEDURE — 1123F ACP DISCUSS/DSCN MKR DOCD: CPT | Performed by: INTERNAL MEDICINE

## 2018-10-05 PROCEDURE — 4040F PNEUMOC VAC/ADMIN/RCVD: CPT | Performed by: INTERNAL MEDICINE

## 2018-10-05 PROCEDURE — G8484 FLU IMMUNIZE NO ADMIN: HCPCS | Performed by: INTERNAL MEDICINE

## 2018-10-05 RX ORDER — HYDROCODONE BITARTRATE AND ACETAMINOPHEN 5; 325 MG/1; MG/1
1 TABLET ORAL EVERY 8 HOURS PRN
Qty: 60 TABLET | Refills: 0 | Status: SHIPPED | OUTPATIENT
Start: 2018-10-17 | End: 2019-01-11 | Stop reason: SDUPTHER

## 2018-10-05 NOTE — TELEPHONE ENCOUNTER
Dr Jose Georges put lab orders in, would you like to add additional orders? Also informed pt's wife that order are already in computer system for lab to pull up when they come in.

## 2018-10-05 NOTE — PROGRESS NOTES
Chief Complaint   Patient presents with    Tremors    Chronic Pain     med refill     Extremity Weakness     feels weak with any walking        [place] 69 y.o. male is here for follow-up of Parkinson's disease Chronic pain, hyperlipidemia and arthritis     He is accompanied by his wife    He had a prior therapeutic response to Sinemet. He continues having intermittent problems with tremor. There daily, exacerbated by stress or anxiety, he is having problems with dyspnea on exertion    He continues seeing the cardiologist regularly and is scheduled for follow-up next week. At this point in time he denies comorbid chest pain or chest pressure left arm pain or other radiated symptoms suggestive of exacerbation of his coronary artery disease. He has had problems with daily moderately severe pain refractory to over-the-counter treatment and other nonsteroidals for which he takes hydrocodone. He advises me he averages 1 or 2 tablets per day. Current Outpatient Prescriptions on File Prior to Visit   Medication Sig Dispense Refill    carbidopa-levodopa (SINEMET CR)  MG per extended release tablet TAKE 1 TABLET BY MOUTH TWICE DAILY 180 tablet 3    simvastatin (ZOCOR) 20 MG tablet Take 1 tablet by mouth nightly 90 tablet 3    clopidogrel (PLAVIX) 75 MG tablet Take 1 tablet by mouth daily 90 tablet 3    aspirin 81 MG EC tablet Take 81 mg by mouth daily. No current facility-administered medications on file prior to visit. Review of systems no headache, blurred vision, double vision complains of rhinorrhea which he has had since he has been 7years old.   Past Medical History:   Diagnosis Date    CAD (coronary artery disease)     [facility]     Coronary artery bypass 1998    Coronary stent 2003    2    Hyperlipidemia     Hypertension     Osteoarthritis     [facility]    Rotator cuff syndrome             /60   Pulse progressive disease. He has a cardiology follow-up scheduled           Diagnosis Orders   1. Coronary artery disease involving native coronary artery of native heart without angina pectoris  EKG 12 Lead    Lipid Panel   2. SOB (shortness of breath)  XR CHEST STANDARD (2 VW)    Comprehensive Metabolic Panel    CBC Auto Differential   3.  Chronic pain disorder  HYDROcodone-acetaminophen (NORCO) 5-325 MG per tablet   Parkinson's disease, continue Sinemet

## 2018-10-06 ENCOUNTER — HOSPITAL ENCOUNTER (OUTPATIENT)
Age: 78
Discharge: HOME | End: 2018-10-06
Payer: MEDICARE

## 2018-10-06 ENCOUNTER — HOSPITAL ENCOUNTER (OUTPATIENT)
Dept: GENERAL RADIOLOGY | Age: 78
Discharge: HOME | End: 2018-10-06
Payer: MEDICARE

## 2018-10-06 DIAGNOSIS — R06.02 SOB (SHORTNESS OF BREATH): ICD-10-CM

## 2018-10-06 LAB
A/G RATIO: 1.7 (ref 1.1–2.2)
ALBUMIN SERPL-MCNC: 4.2 G/DL (ref 3.4–5)
ALP BLD-CCNC: 108 U/L (ref 40–129)
ALT SERPL-CCNC: <5 U/L (ref 10–40)
ANION GAP SERPL CALCULATED.3IONS-SCNC: 12 MMOL/L (ref 3–16)
AST SERPL-CCNC: 8 U/L (ref 15–37)
BASOPHILS ABSOLUTE: 0 K/UL (ref 0–0.2)
BASOPHILS RELATIVE PERCENT: 0.3 %
BILIRUB SERPL-MCNC: 0.4 MG/DL (ref 0–1)
BUN BLDV-MCNC: 17 MG/DL (ref 7–20)
CALCIUM SERPL-MCNC: 9.5 MG/DL (ref 8.3–10.6)
CHLORIDE BLD-SCNC: 104 MMOL/L (ref 99–110)
CHOLESTEROL, TOTAL: 139 MG/DL (ref 0–199)
CO2: 25 MMOL/L (ref 21–32)
CREAT SERPL-MCNC: 0.9 MG/DL (ref 0.8–1.3)
EOSINOPHILS ABSOLUTE: 0.1 K/UL (ref 0–0.6)
EOSINOPHILS RELATIVE PERCENT: 1.7 %
GFR AFRICAN AMERICAN: >60
GFR NON-AFRICAN AMERICAN: >60
GLOBULIN: 2.5 G/DL
GLUCOSE BLD-MCNC: 88 MG/DL (ref 70–99)
HCT VFR BLD CALC: 37.2 % (ref 40.5–52.5)
HDLC SERPL-MCNC: 46 MG/DL (ref 40–60)
HEMOGLOBIN: 12.4 G/DL (ref 13.5–17.5)
LDL CHOLESTEROL CALCULATED: 74 MG/DL
LYMPHOCYTES ABSOLUTE: 1.4 K/UL (ref 1–5.1)
LYMPHOCYTES RELATIVE PERCENT: 20 %
MCH RBC QN AUTO: 30.7 PG (ref 26–34)
MCHC RBC AUTO-ENTMCNC: 33.4 G/DL (ref 31–36)
MCV RBC AUTO: 92 FL (ref 80–100)
MONOCYTES ABSOLUTE: 0.4 K/UL (ref 0–1.3)
MONOCYTES RELATIVE PERCENT: 5.9 %
NEUTROPHILS ABSOLUTE: 5.2 K/UL (ref 1.7–7.7)
NEUTROPHILS RELATIVE PERCENT: 72.1 %
PDW BLD-RTO: 15.1 % (ref 12.4–15.4)
PLATELET # BLD: 206 K/UL (ref 135–450)
PMV BLD AUTO: 7.9 FL (ref 5–10.5)
POTASSIUM SERPL-SCNC: 4.5 MMOL/L (ref 3.5–5.1)
RBC # BLD: 4.04 M/UL (ref 4.2–5.9)
SODIUM BLD-SCNC: 141 MMOL/L (ref 136–145)
TOTAL PROTEIN: 6.7 G/DL (ref 6.4–8.2)
TRIGL SERPL-MCNC: 95 MG/DL (ref 0–150)
VLDLC SERPL CALC-MCNC: 19 MG/DL
WBC # BLD: 7.2 K/UL (ref 4–11)

## 2018-10-06 PROCEDURE — 36415 COLL VENOUS BLD VENIPUNCTURE: CPT

## 2018-10-06 PROCEDURE — 71046 X-RAY EXAM CHEST 2 VIEWS: CPT

## 2018-10-06 PROCEDURE — 80053 COMPREHEN METABOLIC PANEL: CPT

## 2018-10-06 PROCEDURE — 80061 LIPID PANEL: CPT

## 2018-10-06 PROCEDURE — 85025 COMPLETE CBC W/AUTO DIFF WBC: CPT

## 2018-10-09 ENCOUNTER — OFFICE VISIT (OUTPATIENT)
Dept: CARDIOLOGY CLINIC | Age: 78
End: 2018-10-09
Payer: MEDICARE

## 2018-10-09 VITALS
RESPIRATION RATE: 14 BRPM | DIASTOLIC BLOOD PRESSURE: 82 MMHG | SYSTOLIC BLOOD PRESSURE: 124 MMHG | OXYGEN SATURATION: 98 % | BODY MASS INDEX: 20.76 KG/M2 | WEIGHT: 145 LBS | HEART RATE: 79 BPM | HEIGHT: 70 IN

## 2018-10-09 DIAGNOSIS — I25.10 CORONARY ARTERY DISEASE INVOLVING NATIVE CORONARY ARTERY OF NATIVE HEART WITHOUT ANGINA PECTORIS: Primary | Chronic | ICD-10-CM

## 2018-10-09 DIAGNOSIS — I48.0 PAROXYSMAL ATRIAL FIBRILLATION (HCC): Chronic | ICD-10-CM

## 2018-10-09 DIAGNOSIS — E78.49 OTHER HYPERLIPIDEMIA: Chronic | ICD-10-CM

## 2018-10-09 DIAGNOSIS — I10 ESSENTIAL HYPERTENSION: Chronic | ICD-10-CM

## 2018-10-09 PROCEDURE — G8484 FLU IMMUNIZE NO ADMIN: HCPCS | Performed by: INTERNAL MEDICINE

## 2018-10-09 PROCEDURE — 1036F TOBACCO NON-USER: CPT | Performed by: INTERNAL MEDICINE

## 2018-10-09 PROCEDURE — G8427 DOCREV CUR MEDS BY ELIG CLIN: HCPCS | Performed by: INTERNAL MEDICINE

## 2018-10-09 PROCEDURE — 99214 OFFICE O/P EST MOD 30 MIN: CPT | Performed by: INTERNAL MEDICINE

## 2018-10-09 PROCEDURE — 1123F ACP DISCUSS/DSCN MKR DOCD: CPT | Performed by: INTERNAL MEDICINE

## 2018-10-09 PROCEDURE — G8598 ASA/ANTIPLAT THER USED: HCPCS | Performed by: INTERNAL MEDICINE

## 2018-10-09 PROCEDURE — 1101F PT FALLS ASSESS-DOCD LE1/YR: CPT | Performed by: INTERNAL MEDICINE

## 2018-10-09 PROCEDURE — 4040F PNEUMOC VAC/ADMIN/RCVD: CPT | Performed by: INTERNAL MEDICINE

## 2018-10-09 PROCEDURE — G8420 CALC BMI NORM PARAMETERS: HCPCS | Performed by: INTERNAL MEDICINE

## 2018-10-23 ENCOUNTER — TELEPHONE (OUTPATIENT)
Dept: INTERNAL MEDICINE CLINIC | Age: 78
End: 2018-10-23

## 2018-10-23 RX ORDER — BENZONATATE 200 MG/1
200 CAPSULE ORAL 3 TIMES DAILY PRN
Qty: 21 CAPSULE | Refills: 0 | Status: SHIPPED | OUTPATIENT
Start: 2018-10-23 | End: 2018-10-30

## 2018-11-19 RX ORDER — CLOPIDOGREL BISULFATE 75 MG/1
TABLET ORAL
Qty: 90 TABLET | Refills: 3 | Status: SHIPPED | OUTPATIENT
Start: 2018-11-19 | End: 2019-11-21 | Stop reason: SDUPTHER

## 2018-12-03 ENCOUNTER — TELEPHONE (OUTPATIENT)
Dept: INTERNAL MEDICINE CLINIC | Age: 78
End: 2018-12-03

## 2018-12-05 ENCOUNTER — OFFICE VISIT (OUTPATIENT)
Dept: INTERNAL MEDICINE CLINIC | Age: 78
End: 2018-12-05
Payer: MEDICARE

## 2018-12-05 VITALS
SYSTOLIC BLOOD PRESSURE: 138 MMHG | BODY MASS INDEX: 20.66 KG/M2 | HEART RATE: 80 BPM | DIASTOLIC BLOOD PRESSURE: 76 MMHG | WEIGHT: 144 LBS

## 2018-12-05 DIAGNOSIS — K59.1 FUNCTIONAL DIARRHEA: Primary | ICD-10-CM

## 2018-12-05 DIAGNOSIS — G20 PRIMARY PARKINSONISM (HCC): ICD-10-CM

## 2018-12-05 DIAGNOSIS — I10 ESSENTIAL HYPERTENSION: ICD-10-CM

## 2018-12-05 PROCEDURE — 1101F PT FALLS ASSESS-DOCD LE1/YR: CPT | Performed by: INTERNAL MEDICINE

## 2018-12-05 PROCEDURE — G8427 DOCREV CUR MEDS BY ELIG CLIN: HCPCS | Performed by: INTERNAL MEDICINE

## 2018-12-05 PROCEDURE — G8420 CALC BMI NORM PARAMETERS: HCPCS | Performed by: INTERNAL MEDICINE

## 2018-12-05 PROCEDURE — G8484 FLU IMMUNIZE NO ADMIN: HCPCS | Performed by: INTERNAL MEDICINE

## 2018-12-05 PROCEDURE — 4040F PNEUMOC VAC/ADMIN/RCVD: CPT | Performed by: INTERNAL MEDICINE

## 2018-12-05 PROCEDURE — 1036F TOBACCO NON-USER: CPT | Performed by: INTERNAL MEDICINE

## 2018-12-05 PROCEDURE — 99214 OFFICE O/P EST MOD 30 MIN: CPT | Performed by: INTERNAL MEDICINE

## 2018-12-05 PROCEDURE — G8598 ASA/ANTIPLAT THER USED: HCPCS | Performed by: INTERNAL MEDICINE

## 2018-12-05 PROCEDURE — 1123F ACP DISCUSS/DSCN MKR DOCD: CPT | Performed by: INTERNAL MEDICINE

## 2018-12-06 NOTE — PROGRESS NOTES
/76   Pulse 80   Wt 144 lb (65.3 kg)   BMI 20.66 kg/m²     General Appearance:  Alert, cooperative, no distress, appears stated age    Neck    Nose  Supple     No lesions, no polyps      advanced osteoarthritis of knees     cardiac   S1 and S2 normal no murmur or rub   Abdomen  soft no masses , Bowel sounds normal no high-pitched sounds or rushes     lungs   clear    Neurological   No cogwheel rigidity, gait has improved, facial expression better Stable from prior visit                  No components found for: CHLPL  Lab Results   Component Value Date    TRIG 95 10/06/2018    TRIG 96 03/31/2018    TRIG 86 05/02/2017     Lab Results   Component Value Date    HDL 46 10/06/2018    HDL 47 03/31/2018    HDL 43 05/02/2017     Lab Results   Component Value Date    LDLCALC 74 10/06/2018    LDLCALC 70 03/31/2018    LDLCALC 73 05/02/2017     Lab Results   Component Value Date    LABVLDL 19 10/06/2018    LABVLDL 19 03/31/2018    LABVLDL 17 05/02/2017     Lab Results   Component Value Date    CREATININE 0.9 10/06/2018                            Diagnosis Orders   1. Functional diarrhea     2. Essential hypertension     3.  Primary Parkinsonism ([address]     Parkinson's disease, continue Sinemet  Chronic allergic rhinitis with vasomotor rhinitis present since the patient was in his childhood, may use decongestants as needed    The diarrhea is likely related to the lack of hydrocodone, encouraged him to stay off of this medication and to try Imodium to control the diarrhea    Parkinson's disease which is stable

## 2019-01-02 DIAGNOSIS — E78.5 HYPERLIPIDEMIA, UNSPECIFIED HYPERLIPIDEMIA TYPE: Chronic | ICD-10-CM

## 2019-01-02 DIAGNOSIS — I25.10 CORONARY ARTERY DISEASE INVOLVING NATIVE CORONARY ARTERY OF NATIVE HEART WITHOUT ANGINA PECTORIS: Chronic | ICD-10-CM

## 2019-01-02 DIAGNOSIS — I10 ESSENTIAL HYPERTENSION: Chronic | ICD-10-CM

## 2019-01-02 RX ORDER — SIMVASTATIN 20 MG
20 TABLET ORAL NIGHTLY
Qty: 90 TABLET | Refills: 3 | Status: SHIPPED | OUTPATIENT
Start: 2019-01-02 | End: 2019-11-21 | Stop reason: SDUPTHER

## 2019-01-11 ENCOUNTER — OFFICE VISIT (OUTPATIENT)
Dept: INTERNAL MEDICINE CLINIC | Age: 79
End: 2019-01-11
Payer: MEDICARE

## 2019-01-11 VITALS
BODY MASS INDEX: 20.81 KG/M2 | WEIGHT: 145 LBS | DIASTOLIC BLOOD PRESSURE: 60 MMHG | SYSTOLIC BLOOD PRESSURE: 100 MMHG | HEART RATE: 72 BPM

## 2019-01-11 DIAGNOSIS — G89.4 CHRONIC PAIN DISORDER: ICD-10-CM

## 2019-01-11 DIAGNOSIS — R19.7 DIARRHEA, UNSPECIFIED TYPE: ICD-10-CM

## 2019-01-11 DIAGNOSIS — I10 ESSENTIAL HYPERTENSION: Primary | Chronic | ICD-10-CM

## 2019-01-11 PROCEDURE — G8484 FLU IMMUNIZE NO ADMIN: HCPCS | Performed by: INTERNAL MEDICINE

## 2019-01-11 PROCEDURE — 1036F TOBACCO NON-USER: CPT | Performed by: INTERNAL MEDICINE

## 2019-01-11 PROCEDURE — 1123F ACP DISCUSS/DSCN MKR DOCD: CPT | Performed by: INTERNAL MEDICINE

## 2019-01-11 PROCEDURE — 1101F PT FALLS ASSESS-DOCD LE1/YR: CPT | Performed by: INTERNAL MEDICINE

## 2019-01-11 PROCEDURE — 4040F PNEUMOC VAC/ADMIN/RCVD: CPT | Performed by: INTERNAL MEDICINE

## 2019-01-11 PROCEDURE — G8427 DOCREV CUR MEDS BY ELIG CLIN: HCPCS | Performed by: INTERNAL MEDICINE

## 2019-01-11 PROCEDURE — G8420 CALC BMI NORM PARAMETERS: HCPCS | Performed by: INTERNAL MEDICINE

## 2019-01-11 PROCEDURE — G8598 ASA/ANTIPLAT THER USED: HCPCS | Performed by: INTERNAL MEDICINE

## 2019-01-11 PROCEDURE — 99214 OFFICE O/P EST MOD 30 MIN: CPT | Performed by: INTERNAL MEDICINE

## 2019-01-11 RX ORDER — HYDROCODONE BITARTRATE AND ACETAMINOPHEN 5; 325 MG/1; MG/1
1 TABLET ORAL EVERY 8 HOURS PRN
Qty: 60 TABLET | Refills: 0 | Status: SHIPPED | OUTPATIENT
Start: 2019-01-11 | End: 2019-04-05 | Stop reason: SDUPTHER

## 2019-01-16 ENCOUNTER — TELEPHONE (OUTPATIENT)
Dept: CARDIOLOGY CLINIC | Age: 79
End: 2019-01-16

## 2019-01-21 ENCOUNTER — ANESTHESIA EVENT (OUTPATIENT)
Dept: ENDOSCOPY | Age: 79
End: 2019-01-21
Payer: MEDICARE

## 2019-01-25 ENCOUNTER — HOSPITAL ENCOUNTER (OUTPATIENT)
Age: 79
Setting detail: OUTPATIENT SURGERY
Discharge: HOME | End: 2019-01-25
Attending: INTERNAL MEDICINE | Admitting: INTERNAL MEDICINE
Payer: MEDICARE

## 2019-01-25 ENCOUNTER — ANESTHESIA (OUTPATIENT)
Dept: ENDOSCOPY | Age: 79
End: 2019-01-25
Payer: MEDICARE

## 2019-01-25 VITALS
TEMPERATURE: 98.4 F | OXYGEN SATURATION: 100 % | WEIGHT: 145 LBS | RESPIRATION RATE: 16 BRPM | BODY MASS INDEX: 20.76 KG/M2 | DIASTOLIC BLOOD PRESSURE: 84 MMHG | HEIGHT: 70 IN | SYSTOLIC BLOOD PRESSURE: 151 MMHG | HEART RATE: 79 BPM

## 2019-01-25 VITALS — SYSTOLIC BLOOD PRESSURE: 162 MMHG | DIASTOLIC BLOOD PRESSURE: 105 MMHG | OXYGEN SATURATION: 100 %

## 2019-01-25 PROCEDURE — 3700000000 HC ANESTHESIA ATTENDED CARE: Performed by: INTERNAL MEDICINE

## 2019-01-25 PROCEDURE — 2580000003 HC RX 258: Performed by: FAMILY MEDICINE

## 2019-01-25 PROCEDURE — 7100000011 HC PHASE II RECOVERY - ADDTL 15 MIN: Performed by: INTERNAL MEDICINE

## 2019-01-25 PROCEDURE — 3609009000 HC SIGMOIDOSCOPY SCREENING: Performed by: INTERNAL MEDICINE

## 2019-01-25 PROCEDURE — 7100000010 HC PHASE II RECOVERY - FIRST 15 MIN: Performed by: INTERNAL MEDICINE

## 2019-01-25 PROCEDURE — 2709999900 HC NON-CHARGEABLE SUPPLY: Performed by: INTERNAL MEDICINE

## 2019-01-25 PROCEDURE — 2500000003 HC RX 250 WO HCPCS: Performed by: NURSE ANESTHETIST, CERTIFIED REGISTERED

## 2019-01-25 PROCEDURE — 6360000002 HC RX W HCPCS: Performed by: NURSE ANESTHETIST, CERTIFIED REGISTERED

## 2019-01-25 RX ORDER — SODIUM CHLORIDE 0.9 % (FLUSH) 0.9 %
10 SYRINGE (ML) INJECTION PRN
Status: DISCONTINUED | OUTPATIENT
Start: 2019-01-25 | End: 2019-01-25 | Stop reason: HOSPADM

## 2019-01-25 RX ORDER — SODIUM CHLORIDE 0.9 % (FLUSH) 0.9 %
10 SYRINGE (ML) INJECTION EVERY 12 HOURS SCHEDULED
Status: DISCONTINUED | OUTPATIENT
Start: 2019-01-25 | End: 2019-01-25 | Stop reason: HOSPADM

## 2019-01-25 RX ORDER — PROPOFOL 10 MG/ML
INJECTION, EMULSION INTRAVENOUS PRN
Status: DISCONTINUED | OUTPATIENT
Start: 2019-01-25 | End: 2019-01-25 | Stop reason: SDUPTHER

## 2019-01-25 RX ORDER — SODIUM CHLORIDE 9 MG/ML
INJECTION, SOLUTION INTRAVENOUS CONTINUOUS
Status: DISCONTINUED | OUTPATIENT
Start: 2019-01-25 | End: 2019-01-25 | Stop reason: HOSPADM

## 2019-01-25 RX ORDER — LIDOCAINE HYDROCHLORIDE 20 MG/ML
INJECTION, SOLUTION INFILTRATION; PERINEURAL PRN
Status: DISCONTINUED | OUTPATIENT
Start: 2019-01-25 | End: 2019-01-25 | Stop reason: SDUPTHER

## 2019-01-25 RX ADMIN — LIDOCAINE HYDROCHLORIDE 100 MG: 20 INJECTION, SOLUTION INFILTRATION; PERINEURAL at 08:18

## 2019-01-25 RX ADMIN — PROPOFOL 100 MG: 10 INJECTION, EMULSION INTRAVENOUS at 08:18

## 2019-01-25 RX ADMIN — SODIUM CHLORIDE: 9 INJECTION, SOLUTION INTRAVENOUS at 07:52

## 2019-01-25 RX ADMIN — SODIUM CHLORIDE: 9 INJECTION, SOLUTION INTRAVENOUS at 08:17

## 2019-01-25 ASSESSMENT — PAIN SCALES - GENERAL: PAINLEVEL_OUTOF10: 0

## 2019-01-25 ASSESSMENT — COPD QUESTIONNAIRES: CAT_SEVERITY: MODERATE

## 2019-01-25 ASSESSMENT — PAIN - FUNCTIONAL ASSESSMENT
PAIN_FUNCTIONAL_ASSESSMENT: 0-10
PAIN_FUNCTIONAL_ASSESSMENT: 0-10

## 2019-03-04 ENCOUNTER — TELEPHONE (OUTPATIENT)
Dept: INTERNAL MEDICINE CLINIC | Age: 79
End: 2019-03-04

## 2019-04-02 ENCOUNTER — TELEPHONE (OUTPATIENT)
Dept: CARDIOLOGY CLINIC | Age: 79
End: 2019-04-02

## 2019-04-02 NOTE — TELEPHONE ENCOUNTER
Plan:  Reji Lagunas has ongoing struggles with his Parkinson's symptoms. Letter given for handicap placard due to his balance issues and cardiac issues  Advised to add salt  to his diet to treat autonomic insufficiency. He does not require salt restriction  Reviewed recent blood work -- stable. Annual labs. No med changes. Will continue with risk factor modifications. Return for regular follow up in 6 months.     I appreciate the opportunity of cooperating in the care of this individual.     Mago Mayfield M.D., [facility] [place]

## 2019-04-02 NOTE — TELEPHONE ENCOUNTER
Pt called to schedule f/u appt. Scheduled on 4-30-19. Does he need to have lab work done? If so he asked that the orders be put in system so when he comes to have them done they can pull them up at registration. Pls call to advise. Thank you.

## 2019-04-05 ENCOUNTER — OFFICE VISIT (OUTPATIENT)
Dept: INTERNAL MEDICINE CLINIC | Age: 79
End: 2019-04-05
Payer: MEDICARE

## 2019-04-05 VITALS
HEIGHT: 70 IN | BODY MASS INDEX: 20.33 KG/M2 | WEIGHT: 142 LBS | SYSTOLIC BLOOD PRESSURE: 132 MMHG | DIASTOLIC BLOOD PRESSURE: 84 MMHG | HEART RATE: 64 BPM

## 2019-04-05 DIAGNOSIS — G89.4 CHRONIC PAIN DISORDER: Primary | ICD-10-CM

## 2019-04-05 DIAGNOSIS — I10 ESSENTIAL HYPERTENSION: ICD-10-CM

## 2019-04-05 DIAGNOSIS — G20 PRIMARY PARKINSONISM (HCC): ICD-10-CM

## 2019-04-05 PROCEDURE — 1036F TOBACCO NON-USER: CPT | Performed by: INTERNAL MEDICINE

## 2019-04-05 PROCEDURE — G8598 ASA/ANTIPLAT THER USED: HCPCS | Performed by: INTERNAL MEDICINE

## 2019-04-05 PROCEDURE — 99214 OFFICE O/P EST MOD 30 MIN: CPT | Performed by: INTERNAL MEDICINE

## 2019-04-05 PROCEDURE — G8420 CALC BMI NORM PARAMETERS: HCPCS | Performed by: INTERNAL MEDICINE

## 2019-04-05 PROCEDURE — 4040F PNEUMOC VAC/ADMIN/RCVD: CPT | Performed by: INTERNAL MEDICINE

## 2019-04-05 PROCEDURE — G8427 DOCREV CUR MEDS BY ELIG CLIN: HCPCS | Performed by: INTERNAL MEDICINE

## 2019-04-05 PROCEDURE — 1123F ACP DISCUSS/DSCN MKR DOCD: CPT | Performed by: INTERNAL MEDICINE

## 2019-04-05 RX ORDER — HYDROCODONE BITARTRATE AND ACETAMINOPHEN 5; 325 MG/1; MG/1
1 TABLET ORAL EVERY 8 HOURS PRN
Qty: 90 TABLET | Refills: 0 | Status: SHIPPED | OUTPATIENT
Start: 2019-04-05 | End: 2019-07-05 | Stop reason: SDUPTHER

## 2019-04-05 ASSESSMENT — PATIENT HEALTH QUESTIONNAIRE - PHQ9
SUM OF ALL RESPONSES TO PHQ QUESTIONS 1-9: 0
2. FEELING DOWN, DEPRESSED OR HOPELESS: 0
1. LITTLE INTEREST OR PLEASURE IN DOING THINGS: 0
SUM OF ALL RESPONSES TO PHQ9 QUESTIONS 1 & 2: 0
SUM OF ALL RESPONSES TO PHQ QUESTIONS 1-9: 0

## 2019-04-05 NOTE — PROGRESS NOTES
Controlled Substances Monitoring:     RX Monitoring 4/5/2019   Attestation -   Chronic Pain Routine Monitoring Possible medication side effects, risk of tolerance/dependence & alternative treatments discussed. ;No signs of potential drug abuse or diversion identified: otherwise, see note documentation     Chief Complaint   Patient presents with    Hypertension    Hyperlipidemia    Joint Pain     Pt c/o having pain in multiple joints. would like to discuss refill on Induaraceli RED Lombardi 69 y.o. male is here for follow-up of Parkinson's disease Chronic pain, hyperlipidemia and arthritis     He had a prior therapeutic response to Sinemet. He continues having intermittent problems with tremor. There daily, exacerbated by stress or anxiety, he is having problems with dyspnea on exertion    He continues seeing the cardiologist regularly and is scheduled for follow-up next week. At this point in time he denies comorbid chest pain or chest pressure left arm pain or other radiated symptoms suggestive of exacerbation of his coronary artery disease. He has had problems with daily moderately severe pain refractory to over-the-counter treatment and other nonsteroidals for which he takes hydrocodone. He advises me he averages 1 or 2 tablets per day. At this point in time his pain is getting increasingly worse and he finds that 1 or 2 tablets a day isn't effective in controlling him to any significant degree. He requested an increase in the frequency of his dosing. Current Outpatient Medications on File Prior to Visit   Medication Sig Dispense Refill    simvastatin (ZOCOR) 20 MG tablet Take 1 tablet by mouth nightly 90 tablet 3    clopidogrel (PLAVIX) 75 MG tablet TAKE 1 TABLET BY MOUTH EVERY DAY 90 tablet 3    carbidopa-levodopa (SINEMET CR)  MG per extended release tablet TAKE 1 TABLET BY MOUTH TWICE DAILY 180 tablet 3    aspirin 81 MG EC tablet Take 81 mg by mouth daily.          No current continue current antihypertensive treatment and has follow-up with cardiology next week    I had a long discussion with him regarding his chronic use of pain medications. He has tried multiple nonsteroidal anti-inflammatory drugs, acetaminophen is largely ineffective and he has not a good candidate for joint replacement surgery. Alternative treatments were discussed. I believe that an increase in dose of the narcotic pain medication is reasonable and indicated given the lack of response to other alternative drugs and the patient's severe symptoms. He is well aware of the addictive nature and contraindications to this medication           Diagnosis Orders   1.  Chronic pain disorder  HYDROcodone-acetaminophen (NORCO) 5-325 MG per tablet   Parkinson's disease, continue Sinemet

## 2019-05-21 NOTE — PROGRESS NOTES
[address]   Cardiac Follow up    Referring Provider:  Claudell Grist, MD     Chief Complaint   Patient presents with    Coronary Artery Disease      History of Present Illness:  Mr. Deirdre Montana is a 67 y.o. gentleman with a history of CAD, hypertension, hyperlipidemia. In November 2013, he had stent placement to the RCA. He also has copd, managment by Chava Coronel. Diagnosis of Parkinson's. Today, he states he feels ok  He denies exertional chest pain, LORENZ/PND, palpitations, edema. His wife is also being seen today, she states that he frequently falls. He has difficulty standing, tremors noted to right hand and leg. Past Medical History:   has a past medical history of CAD (coronary artery disease), COPD (chronic obstructive pulmonary disease) ([facility] Coronary artery bypass, Coronary stent, Hyperlipidemia, Hypertension, Osteoarthritis, Prostate cancer ([facility] [address] and Rotator cuff syndrome. Surgical History:   has a past surgical history that includes Prostatectomy (2004); Colonoscopy (2005); Coronary artery bypass graft; Cardiac surgery (1998); Coronary angioplasty (1993); Coronary angioplasty (2013); Cataract removal with implant (2015); and sigmoidoscopy (N/A, 1/25/2019). Social History:   reports that he quit smoking about 36 years ago. He has a 60.00 pack-year smoking history. He has never used smokeless tobacco. He reports that he does not drink alcohol or use drugs. Family History:  family history includes Cancer in his mother and sister; Colon Cancer in his mother and sister.      Home Medications:     Current Outpatient Medications   Medication Sig Dispense Refill    simvastatin (ZOCOR) 20 MG tablet Take 1 tablet by mouth nightly 90 tablet 3    clopidogrel (PLAVIX) 75 MG tablet TAKE 1 TABLET BY MOUTH EVERY DAY 90 tablet 3    carbidopa-levodopa (SINEMET CR)  MG per extended release tablet TAKE 1 TABLET BY MOUTH TWICE DAILY 180 tablet 3    aspirin 81 MG EC tablet Take 81 mg by mouth daily. No current facility-administered medications for this visit. Allergies:  Patient has no known allergies. Review of Systems:   · Constitutional: there has been no unanticipated weight loss. There's been no change in energy level, sleep pattern, or activity level. · Eyes: No visual changes or diplopia. No scleral icterus. · ENT: No Headaches, hearing loss or vertigo. No mouth sores or sore throat. · Cardiovascular: Reviewed in HPI  · Respiratory: No cough or wheezing, no sputum production. No hematemesis. · Gastrointestinal: No abdominal pain, appetite loss, blood in stools. No change in bowel or bladder habits. · Genitourinary: No dysuria, trouble voiding, or hematuria. · Musculoskeletal:  No gait disturbance, weakness or joint complaints. +wooziness  · Integumentary: No rash or pruritis. · Neurological: No headache, diplopia, change in muscle strength, numbness or tingling. No change in gait, balance, coordination, mood, affect, memory, mentation, behavior. · Psychiatric: No anxiety, no depression. · Endocrine: No malaise, fatigue or temperature intolerance. No excessive thirst, fluid intake, or urination. No tremor. · Hematologic/Lymphatic: No abnormal bruising or bleeding, blood clots or swollen lymph nodes. · Allergic/Immunologic: No nasal congestion or hives.     Physical Examination:    Vitals:    05/24/19 1520   BP: 137/79   Pulse: 80        Constitutional and General Appearance: NAD  Skin:good turgor,intact without lesions  HEENT: EOMI ,normal  Neck:no JVD     Respiratory:  · Normal excursion and expansion without use of accessory muscles  · Resp Auscultation: Normal breath sounds without dullness  Cardiovascular:  · The apical impulses not displaced  · Heart tones are crisp and normal  · Cervical veins are not engorged  · The carotid upstroke is normal in amplitude and contour without delay or bruit  · Normal heart tones  · Peripheral pulses are symmetrical and full  · There is no clubbing, cyanosis of the extremities. · No edema  · Femoral Arteries: 2+ and equal  · Pedal Pulses: 2+ and equal   Abdomen:  · No masses or tenderness  · Liver/Spleen: No Abnormalities Noted  Neurological/Psychiatric:persistent right hand tremor,much worse with generalized tremors  · Alert and oriented in all spheres  · Moves all extremities well  · Exhibits normal gait balance and coordination  · No abnormalities of mood, affect, memory, mentation, or behavior are noted    Assessment:  1. Coronary artery disease/CABG: Stable, no anginal symptoms  Cath 2009> Patent grafts and previously placed stents. GXT myoview 2012> normal perfusion, EF 70%. C 11/20/13> patent LAD stent, patent intermediate, patent distal RCA stent, normal EF 80%, mid RCA treated w/ 3. 5 x 15 w/ excellent results. Unable to place closure device so manual pressure used. 2. Hyperlipidemia: Good control taking Zocor 20 mg daily. 10/6/18> , trig 95, HDL 46, LDL 74   3. Hypertension: Stable. /79 (Site: Left Upper Arm, Position: Sitting, Cuff Size: Medium Adult)   Pulse 80   Ht 5' 10\" (1.778 m)   Wt 146 lb (66.2 kg)   BMI 20.95 kg/m²       4. Fatigue: Recommend exercise and routine activity  5. COPD (chronic obstructive pulmonary disease): Some SOB with inclines. 6.  Tremors: He has been diagnosed with Parkinson's, worse than last visit. Follow with Lila Tubbs. - he continues to refuse therapy. Plan:  Joni Murphy has ongoing struggles with his Parkinson's symptoms. No med changes. Referral sent to Dr. Dorene Moreno-Neurology   Will continue with risk factor modifications. Return for regular follow up in 6 months. I appreciate the opportunity of cooperating in the care of this individual.    Sherie Gutiérrez. Cici Donald M.D., [facility] attestation: This note was scribed in the presence of Dr. Cici Donald MD, by Lina Friedman RN.     The scribe's documentation has been prepared under my direction and personally

## 2019-05-24 ENCOUNTER — OFFICE VISIT (OUTPATIENT)
Dept: CARDIOLOGY CLINIC | Age: 79
End: 2019-05-24
Payer: MEDICARE

## 2019-05-24 VITALS
DIASTOLIC BLOOD PRESSURE: 79 MMHG | HEIGHT: 70 IN | SYSTOLIC BLOOD PRESSURE: 137 MMHG | HEART RATE: 80 BPM | WEIGHT: 146 LBS | BODY MASS INDEX: 20.9 KG/M2

## 2019-05-24 DIAGNOSIS — I25.10 CORONARY ARTERY DISEASE INVOLVING NATIVE CORONARY ARTERY OF NATIVE HEART WITHOUT ANGINA PECTORIS: Primary | Chronic | ICD-10-CM

## 2019-05-24 DIAGNOSIS — I48.0 PAROXYSMAL ATRIAL FIBRILLATION (HCC): ICD-10-CM

## 2019-05-24 DIAGNOSIS — G20 PARKINSON DISEASE (HCC): Chronic | ICD-10-CM

## 2019-05-24 DIAGNOSIS — I10 ESSENTIAL HYPERTENSION: Chronic | ICD-10-CM

## 2019-05-24 DIAGNOSIS — E78.49 OTHER HYPERLIPIDEMIA: Chronic | ICD-10-CM

## 2019-05-24 DIAGNOSIS — G20 PARKINSON'S DISEASE (HCC): ICD-10-CM

## 2019-05-24 PROCEDURE — 1036F TOBACCO NON-USER: CPT | Performed by: INTERNAL MEDICINE

## 2019-05-24 PROCEDURE — G8427 DOCREV CUR MEDS BY ELIG CLIN: HCPCS | Performed by: INTERNAL MEDICINE

## 2019-05-24 PROCEDURE — 99214 OFFICE O/P EST MOD 30 MIN: CPT | Performed by: INTERNAL MEDICINE

## 2019-05-24 PROCEDURE — 4040F PNEUMOC VAC/ADMIN/RCVD: CPT | Performed by: INTERNAL MEDICINE

## 2019-05-24 PROCEDURE — 1123F ACP DISCUSS/DSCN MKR DOCD: CPT | Performed by: INTERNAL MEDICINE

## 2019-05-24 PROCEDURE — G8420 CALC BMI NORM PARAMETERS: HCPCS | Performed by: INTERNAL MEDICINE

## 2019-05-24 PROCEDURE — G8598 ASA/ANTIPLAT THER USED: HCPCS | Performed by: INTERNAL MEDICINE

## 2019-05-28 PROBLEM — G20.A1 PARKINSON DISEASE: Chronic | Status: ACTIVE | Noted: 2019-05-28

## 2019-05-28 PROBLEM — G20 PARKINSON DISEASE (HCC): Chronic | Status: ACTIVE | Noted: 2019-05-28

## 2019-05-29 ENCOUNTER — OFFICE VISIT (OUTPATIENT)
Dept: INTERNAL MEDICINE CLINIC | Age: 79
End: 2019-05-29
Payer: MEDICARE

## 2019-05-29 VITALS
OXYGEN SATURATION: 98 % | SYSTOLIC BLOOD PRESSURE: 102 MMHG | BODY MASS INDEX: 19.93 KG/M2 | HEIGHT: 70 IN | DIASTOLIC BLOOD PRESSURE: 68 MMHG | HEART RATE: 98 BPM | WEIGHT: 139.2 LBS

## 2019-05-29 DIAGNOSIS — M25.511 CHRONIC RIGHT SHOULDER PAIN: Primary | ICD-10-CM

## 2019-05-29 DIAGNOSIS — G89.29 CHRONIC RIGHT SHOULDER PAIN: Primary | ICD-10-CM

## 2019-05-29 DIAGNOSIS — Z91.81 AT HIGH RISK FOR FALLS: ICD-10-CM

## 2019-05-29 PROCEDURE — 4040F PNEUMOC VAC/ADMIN/RCVD: CPT | Performed by: NURSE PRACTITIONER

## 2019-05-29 PROCEDURE — 1123F ACP DISCUSS/DSCN MKR DOCD: CPT | Performed by: NURSE PRACTITIONER

## 2019-05-29 PROCEDURE — 99213 OFFICE O/P EST LOW 20 MIN: CPT | Performed by: NURSE PRACTITIONER

## 2019-05-29 PROCEDURE — G8427 DOCREV CUR MEDS BY ELIG CLIN: HCPCS | Performed by: NURSE PRACTITIONER

## 2019-05-29 PROCEDURE — G8420 CALC BMI NORM PARAMETERS: HCPCS | Performed by: NURSE PRACTITIONER

## 2019-05-29 PROCEDURE — 1036F TOBACCO NON-USER: CPT | Performed by: NURSE PRACTITIONER

## 2019-05-29 PROCEDURE — G8598 ASA/ANTIPLAT THER USED: HCPCS | Performed by: NURSE PRACTITIONER

## 2019-05-29 RX ORDER — NAPROXEN 500 MG/1
500 TABLET ORAL 2 TIMES DAILY WITH MEALS
Qty: 60 TABLET | Refills: 0 | Status: SHIPPED | OUTPATIENT
Start: 2019-05-29 | End: 2019-05-29 | Stop reason: SDUPTHER

## 2019-05-29 ASSESSMENT — ENCOUNTER SYMPTOMS
CONSTIPATION: 0
DIARRHEA: 0
COUGH: 0
SHORTNESS OF BREATH: 0
NAUSEA: 0
VOMITING: 0
WHEEZING: 0
ABDOMINAL PAIN: 0

## 2019-05-29 NOTE — PROGRESS NOTES
Acute Office Visit  5/29/2019    SUBJECTIVE:    Patient ID: Yannick Whitman is a 67 y.o. male. Chief Complaint   Patient presents with    Shoulder Pain     Bilateral--sharp continous pain--rated 10--limited ROM     HPI: The patient presents to the office for an acute visit. When I entered the pt's room, he states \"I hope you have an hour or more. \" Pt states there are many concerns. The patient reports that he is having tremors, weakness and falls. Eventually he reported that he has Parkinson's. He was seen by his cardiologist today for workup for these symptoms. Is referred to neurologist for tremor/weakness/falls and Parkinson's. Pt reports bilateral sharp shoulder pain. No injury/trauma. He reports that he has had this pain in the past and the pain has been going on for years. He has used norco for pain relief. He reports that he still has pain medication left at this time and he is working with PCP to decrease pain medication. He has used cortisone injections bilaterally years ago. Patient reports he is no longer allowed to have steroid injections in his knees per ortho. He states that Excedrin helps relieve the pain. He has not used another OTC regimen. No chest pain, palpitations or blurred vision. No Known Allergies    Current Outpatient Medications   Medication Sig Dispense Refill    simvastatin (ZOCOR) 20 MG tablet Take 1 tablet by mouth nightly 90 tablet 3    clopidogrel (PLAVIX) 75 MG tablet TAKE 1 TABLET BY MOUTH EVERY DAY 90 tablet 3    carbidopa-levodopa (SINEMET CR)  MG per extended release tablet TAKE 1 TABLET BY MOUTH TWICE DAILY 180 tablet 3    aspirin 81 MG EC tablet Take 81 mg by mouth daily.  NAPROXEN 500 MG EC tablet TAKE 1 TABLET BY MOUTH TWICE DAILY WITH MEALS AS NEEDED 180 tablet 0     No current facility-administered medications for this visit. Review of Systems   Constitutional: Negative for chills, fatigue, fever and unexpected weight change. get another refill until July. However, he states that he is going to need this sooner. He states that he does not need any today because he still has some left. - I do not see recent imaging. However, the pain is worsening and she has limited range of motion. I recommended imaging at this time. The patient declines. - The patient reports that he is seeing orthopedics and they are no longer giving him cortisone injections in his knees. He is unsure if he can receive anymore and his shoulders.  - Patient reports that Excedrin relieves his pain in his shoulder when he takes it for headaches. I recommended anti-inflammatories-naproxen prescribed. - naproxen (NAPROXEN) 500 MG EC tablet; Take 1 tablet by mouth 2 times daily (with meals) As needed - patient education handout provided and reviewed with the pt. - I also recommend he use lidocaine patches and heating pads as needed and continue Norco as prescribed. - Pt will call if symptoms worsen or fail to improve. Safety reviewed with the pt. Continue work up with neurology and cardiology. Return for as previously scheduled or sooner if needed. Pt informed to call if symptoms worsen or fail to improve. All questions answered. Patient states no further questions or concerns at this time. Electronically signed by LEONORA Lui CNP 05/29/19    On the basis of positive falls risk screening, assessment and plan is as follows: home safety tips provided.

## 2019-05-29 NOTE — PATIENT INSTRUCTIONS
slurred speech, feeling short of breath. Stop using naproxen and call your doctor at once if you have:  · shortness of breath (even with mild exertion); · swelling or rapid weight gain;  · the first sign of any skin rash, no matter how mild;  · signs of stomach bleeding --bloody or tarry stools, coughing up blood or vomit that looks like coffee grounds;  · liver problems --nausea, upper stomach pain, itching, tired feeling, flu-like symptoms, loss of appetite, dark urine, margot-colored stools, jaundice (yellowing of the skin or eyes);  · kidney problems --little or no urinating, painful or difficult urination, swelling in your feet or ankles, feeling tired or short of breath;  · low red blood cells (anemia) --pale skin, feeling light-headed or short of breath, rapid heart rate, trouble concentrating; or  · severe skin reaction --fever, sore throat, swelling in your face or tongue, burning in your eyes, skin pain followed by a red or purple skin rash that spreads (especially in the face or upper body) and causes blistering and peeling. Common side effects may include:  · indigestion, heartburn, stomach pain, nausea;  · headache, dizziness, drowsiness;  · bruising, itching, rash;  · swelling; or  · ringing in your ears. This is not a complete list of side effects and others may occur. Call your doctor for medical advice about side effects. You may report side effects to FDA at 5-173-FDA-3894. What other drugs will affect naproxen? Ask your doctor before using naproxen if you take an antidepressant such as citalopram, escitalopram, fluoxetine (Prozac), fluvoxamine, paroxetine, sertraline (Zoloft), trazodone, or vilazodone. Taking any of these medicines with an NSAID may cause you to bruise or bleed easily.   Ask a doctor or pharmacist if it is safe for you to use naproxen if you are also using any of the following drugs:  · cholestyramine;  · cyclosporine;  · digoxin;  · lithium;  · methotrexate;  · pemetrexed;  · phenytoin or similar seizure medications;  · probenecid;  · warfarin (Coumadin, Mariah Jerrod) or similar blood thinners;  · a diuretic or \"water pill\";  · heart or blood pressure medication; or  · insulin or oral diabetes medicine. This list is not complete. Other drugs may interact with naproxen, including prescription and over-the-counter medicines, vitamins, and herbal products. Not all possible interactions are listed in this medication guide. Where can I get more information? Your pharmacist can provide more information about naproxen. Remember, keep this and all other medicines out of the reach of children, never share your medicines with others, and use this medication only for the indication prescribed. Every effort has been made to ensure that the information provided by [address] is accurate, up-to-date, and complete, but no guarantee is made to that effect. Drug information contained herein may be time sensitive. [facility] information has been compiled for use by healthcare practitioners and consumers in the [facility] and therefore [facility] does not warrant that uses outside of the [facility] are appropriate, unless specifically indicated otherwise. [facility]'s drug information does not endorse drugs, diagnose patients or recommend therapy. [facility]'s drug information is an informational resource designed to assist licensed healthcare practitioners in caring for their patients and/or to serve consumers viewing this service as a supplement to, and not a substitute for, the expertise, skill, knowledge and judgment of healthcare practitioners. The absence of a warning for a given drug or drug combination in no way should be construed to indicate that the drug or drug combination is safe, effective or appropriate for any given patient.  [facility] does not assume any responsibility for any aspect of healthcare administered with the aid of information [employer] provides. The information contained herein is not intended to cover all possible uses, directions, precautions, warnings, drug interactions, allergic reactions, or adverse effects. If you have questions about the drugs you are taking, check with your doctor, nurse or pharmacist.  Copyright 0867-8230 [address]: 14.01. Revision date: 3/14/2017. Care instructions adapted under license by [facility] ([facility]). If you have questions about a medical condition or this instruction, always ask your healthcare professional. [place] [address] any warranty or liability for your use of this information.

## 2019-05-30 ENCOUNTER — OFFICE VISIT (OUTPATIENT)
Dept: INTERNAL MEDICINE CLINIC | Age: 79
End: 2019-05-30
Payer: MEDICARE

## 2019-05-30 VITALS
SYSTOLIC BLOOD PRESSURE: 120 MMHG | BODY MASS INDEX: 19.94 KG/M2 | HEART RATE: 72 BPM | WEIGHT: 139 LBS | DIASTOLIC BLOOD PRESSURE: 60 MMHG

## 2019-05-30 DIAGNOSIS — M19.011 PRIMARY OSTEOARTHRITIS OF RIGHT SHOULDER: Primary | ICD-10-CM

## 2019-05-30 PROCEDURE — 1123F ACP DISCUSS/DSCN MKR DOCD: CPT | Performed by: INTERNAL MEDICINE

## 2019-05-30 PROCEDURE — G8427 DOCREV CUR MEDS BY ELIG CLIN: HCPCS | Performed by: INTERNAL MEDICINE

## 2019-05-30 PROCEDURE — 99213 OFFICE O/P EST LOW 20 MIN: CPT | Performed by: INTERNAL MEDICINE

## 2019-05-30 PROCEDURE — G8420 CALC BMI NORM PARAMETERS: HCPCS | Performed by: INTERNAL MEDICINE

## 2019-05-30 PROCEDURE — 20610 DRAIN/INJ JOINT/BURSA W/O US: CPT | Performed by: INTERNAL MEDICINE

## 2019-05-30 PROCEDURE — 4040F PNEUMOC VAC/ADMIN/RCVD: CPT | Performed by: INTERNAL MEDICINE

## 2019-05-30 PROCEDURE — G8598 ASA/ANTIPLAT THER USED: HCPCS | Performed by: INTERNAL MEDICINE

## 2019-05-30 PROCEDURE — 1036F TOBACCO NON-USER: CPT | Performed by: INTERNAL MEDICINE

## 2019-05-30 NOTE — PROGRESS NOTES
Chief complaint right shoulder pain    Present illness    The patient is here for an acute appointment    He has multisystem complaints including but not limited to ongoing Parkinsonian tremor (he has been referred to the neurologist) fatigue bilateral knee pain and most acutely bilateral shoulder pain right greater than left    He is having difficulty with activities of daily living are rarely because of right shoulder pain. Physical examination  Appears chronically ill, anxious, does not appear acutely ill or toxic  Masked facies  Parkinsonian tremor  Diminished range of motion right shoulder he is able to abduct just to about 20°, passively I can get him to 110° and he can his arm up against gravity. Some tenderness over bicipital groove    Impression  Osteoarthritis with pain and rotator cuff dysfunction right shoulder    Plan  He advises me that shoulder injections have been effective in the past, I injected his right shoulder with 20 mg of triamcinolone and 2 mL of 2% lidocaine without complication.     Recommended heat, topical nonsteroidals and range of motion exercises

## 2019-07-05 ENCOUNTER — TELEPHONE (OUTPATIENT)
Dept: INTERNAL MEDICINE CLINIC | Age: 79
End: 2019-07-05

## 2019-07-05 DIAGNOSIS — G89.4 CHRONIC PAIN DISORDER: ICD-10-CM

## 2019-07-05 RX ORDER — HYDROCODONE BITARTRATE AND ACETAMINOPHEN 5; 325 MG/1; MG/1
1 TABLET ORAL EVERY 8 HOURS PRN
Qty: 90 TABLET | Refills: 0 | Status: SHIPPED | OUTPATIENT
Start: 2019-07-05 | End: 2019-08-04

## 2019-07-09 ENCOUNTER — OFFICE VISIT (OUTPATIENT)
Dept: INTERNAL MEDICINE CLINIC | Age: 79
End: 2019-07-09
Payer: MEDICARE

## 2019-07-09 VITALS
BODY MASS INDEX: 19.51 KG/M2 | SYSTOLIC BLOOD PRESSURE: 120 MMHG | HEART RATE: 72 BPM | DIASTOLIC BLOOD PRESSURE: 80 MMHG | WEIGHT: 136 LBS

## 2019-07-09 DIAGNOSIS — I10 ESSENTIAL HYPERTENSION: ICD-10-CM

## 2019-07-09 DIAGNOSIS — G89.4 CHRONIC PAIN DISORDER: Primary | ICD-10-CM

## 2019-07-09 DIAGNOSIS — G20 PRIMARY PARKINSONISM (HCC): ICD-10-CM

## 2019-07-09 PROCEDURE — 1036F TOBACCO NON-USER: CPT | Performed by: INTERNAL MEDICINE

## 2019-07-09 PROCEDURE — 4040F PNEUMOC VAC/ADMIN/RCVD: CPT | Performed by: INTERNAL MEDICINE

## 2019-07-09 PROCEDURE — 1123F ACP DISCUSS/DSCN MKR DOCD: CPT | Performed by: INTERNAL MEDICINE

## 2019-07-09 PROCEDURE — G8428 CUR MEDS NOT DOCUMENT: HCPCS | Performed by: INTERNAL MEDICINE

## 2019-07-09 PROCEDURE — G8598 ASA/ANTIPLAT THER USED: HCPCS | Performed by: INTERNAL MEDICINE

## 2019-07-09 PROCEDURE — 99214 OFFICE O/P EST MOD 30 MIN: CPT | Performed by: INTERNAL MEDICINE

## 2019-07-09 PROCEDURE — G8420 CALC BMI NORM PARAMETERS: HCPCS | Performed by: INTERNAL MEDICINE

## 2019-07-09 NOTE — PROGRESS NOTES
prn severe pain. 90 tablet 0    NAPROXEN 500 MG EC tablet TAKE 1 TABLET BY MOUTH TWICE DAILY WITH MEALS AS NEEDED 180 tablet 0    simvastatin (ZOCOR) 20 MG tablet Take 1 tablet by mouth nightly 90 tablet 3    clopidogrel (PLAVIX) 75 MG tablet TAKE 1 TABLET BY MOUTH EVERY DAY 90 tablet 3    carbidopa-levodopa (SINEMET CR)  MG per extended release tablet TAKE 1 TABLET BY MOUTH TWICE DAILY 180 tablet 3    aspirin 81 MG EC tablet Take 81 mg by mouth daily. No current facility-administered medications on file prior to visit. Review of systems no headache, blurred vision, double vision complains of rhinorrhea which he has had since he has been 7years old.   Past Medical History:   Diagnosis Date    CAD (coronary artery disease)     CABG and cardiac stents    COPD (chronic obstructive pulmonary disease) ([facility]     Coronary artery bypass 1998    Coronary stent 2003    2    Hyperlipidemia     Hypertension     Osteoarthritis     Parkinson disease ([facility] 5/28/2019    Prostate cancer ([facility] 2004    Rotator cuff syndrome             /80   Pulse 72   Wt 136 lb (61.7 kg)   BMI 19.51 kg/m²     General Appearance:  Alert, cooperative, no distress, appears stated age    Neck  Supple          cardiac   S1 and S2 normal no murmur or rub   Abdomen  soft no masses     lungs   clear    Neurological   No cogwheel rigidity, gait has improved, facial expression   Stable from prior visit , does have a pill rolling tremor    Musculoskelet    range of motion in the shoulder improved osteoarthritis knees Heberden's and Lashay's nodes     psych   Difficult to assess, has somewhat masked facies secondary to Parkinson's         No components found for: CHLPL  Lab Results   Component Value Date    TRIG 95 10/06/2018    TRIG 96 03/31/2018    TRIG 86 05/02/2017     Lab Results   Component Value Date    HDL 46 10/06/2018    HDL 47 03/31/2018    HDL 43 05/02/2017     Lab Results   Component Value Date    LDLCALC

## 2019-07-28 ENCOUNTER — APPOINTMENT (OUTPATIENT)
Dept: CT IMAGING | Age: 79
End: 2019-07-28
Payer: MEDICARE

## 2019-07-28 ENCOUNTER — HOSPITAL ENCOUNTER (EMERGENCY)
Age: 79
Discharge: HOME | End: 2019-07-28
Payer: MEDICARE

## 2019-07-28 VITALS
HEART RATE: 96 BPM | SYSTOLIC BLOOD PRESSURE: 157 MMHG | OXYGEN SATURATION: 99 % | RESPIRATION RATE: 18 BRPM | DIASTOLIC BLOOD PRESSURE: 98 MMHG | HEIGHT: 70 IN | WEIGHT: 139 LBS | BODY MASS INDEX: 19.9 KG/M2 | TEMPERATURE: 96.8 F

## 2019-07-28 DIAGNOSIS — R19.7 DIARRHEA, UNSPECIFIED TYPE: Primary | ICD-10-CM

## 2019-07-28 LAB
A/G RATIO: 1.4 (ref 1.1–2.2)
ALBUMIN SERPL-MCNC: 3.9 G/DL (ref 3.4–5)
ALP BLD-CCNC: 79 U/L (ref 40–129)
ALT SERPL-CCNC: <5 U/L (ref 10–40)
ANION GAP SERPL CALCULATED.3IONS-SCNC: 10 MMOL/L (ref 3–16)
AST SERPL-CCNC: 10 U/L (ref 15–37)
BASOPHILS ABSOLUTE: 0 K/UL (ref 0–0.2)
BASOPHILS RELATIVE PERCENT: 0.4 %
BILIRUB SERPL-MCNC: 0.5 MG/DL (ref 0–1)
BILIRUBIN URINE: NEGATIVE
BLOOD, URINE: NEGATIVE
BUN BLDV-MCNC: 20 MG/DL (ref 7–20)
CALCIUM SERPL-MCNC: 9.2 MG/DL (ref 8.3–10.6)
CHLORIDE BLD-SCNC: 104 MMOL/L (ref 99–110)
CLARITY: CLEAR
CO2: 23 MMOL/L (ref 21–32)
COLOR: YELLOW
CREAT SERPL-MCNC: 0.9 MG/DL (ref 0.8–1.3)
EOSINOPHILS ABSOLUTE: 0 K/UL (ref 0–0.6)
EOSINOPHILS RELATIVE PERCENT: 0.6 %
GFR AFRICAN AMERICAN: >60
GFR NON-AFRICAN AMERICAN: >60
GLOBULIN: 2.8 G/DL
GLUCOSE BLD-MCNC: 100 MG/DL (ref 70–99)
GLUCOSE URINE: NEGATIVE MG/DL
HCT VFR BLD CALC: 38.2 % (ref 40.5–52.5)
HEMOGLOBIN: 12.2 G/DL (ref 13.5–17.5)
KETONES, URINE: ABNORMAL MG/DL
LEUKOCYTE ESTERASE, URINE: NEGATIVE
LIPASE: 20 U/L (ref 13–60)
LYMPHOCYTES ABSOLUTE: 1 K/UL (ref 1–5.1)
LYMPHOCYTES RELATIVE PERCENT: 12.4 %
MAGNESIUM: 1.9 MG/DL (ref 1.8–2.4)
MCH RBC QN AUTO: 29.7 PG (ref 26–34)
MCHC RBC AUTO-ENTMCNC: 32 G/DL (ref 31–36)
MCV RBC AUTO: 92.7 FL (ref 80–100)
MICROSCOPIC EXAMINATION: ABNORMAL
MONOCYTES ABSOLUTE: 0.3 K/UL (ref 0–1.3)
MONOCYTES RELATIVE PERCENT: 4.4 %
NEUTROPHILS ABSOLUTE: 6.3 K/UL (ref 1.7–7.7)
NEUTROPHILS RELATIVE PERCENT: 82.2 %
NITRITE, URINE: NEGATIVE
PDW BLD-RTO: 14.9 % (ref 12.4–15.4)
PH UA: 6 (ref 5–8)
PLATELET # BLD: 177 K/UL (ref 135–450)
PMV BLD AUTO: 8.1 FL (ref 5–10.5)
POTASSIUM SERPL-SCNC: 4.1 MMOL/L (ref 3.5–5.1)
PROTEIN UA: NEGATIVE MG/DL
RBC # BLD: 4.12 M/UL (ref 4.2–5.9)
SODIUM BLD-SCNC: 137 MMOL/L (ref 136–145)
SPECIFIC GRAVITY UA: 1.02 (ref 1–1.03)
TOTAL PROTEIN: 6.7 G/DL (ref 6.4–8.2)
URINE REFLEX TO CULTURE: ABNORMAL
URINE TYPE: ABNORMAL
UROBILINOGEN, URINE: 1 E.U./DL
WBC # BLD: 7.7 K/UL (ref 4–11)

## 2019-07-28 PROCEDURE — 96361 HYDRATE IV INFUSION ADD-ON: CPT

## 2019-07-28 PROCEDURE — 74177 CT ABD & PELVIS W/CONTRAST: CPT

## 2019-07-28 PROCEDURE — 81003 URINALYSIS AUTO W/O SCOPE: CPT

## 2019-07-28 PROCEDURE — 85025 COMPLETE CBC W/AUTO DIFF WBC: CPT

## 2019-07-28 PROCEDURE — 83690 ASSAY OF LIPASE: CPT

## 2019-07-28 PROCEDURE — 6360000004 HC RX CONTRAST MEDICATION: Performed by: PHYSICIAN ASSISTANT

## 2019-07-28 PROCEDURE — 80053 COMPREHEN METABOLIC PANEL: CPT

## 2019-07-28 PROCEDURE — 83735 ASSAY OF MAGNESIUM: CPT

## 2019-07-28 PROCEDURE — 2580000003 HC RX 258: Performed by: PHYSICIAN ASSISTANT

## 2019-07-28 PROCEDURE — 96374 THER/PROPH/DIAG INJ IV PUSH: CPT

## 2019-07-28 PROCEDURE — 99284 EMERGENCY DEPT VISIT MOD MDM: CPT

## 2019-07-28 PROCEDURE — 6360000002 HC RX W HCPCS: Performed by: PHYSICIAN ASSISTANT

## 2019-07-28 RX ORDER — 0.9 % SODIUM CHLORIDE 0.9 %
500 INTRAVENOUS SOLUTION INTRAVENOUS ONCE
Status: COMPLETED | OUTPATIENT
Start: 2019-07-28 | End: 2019-07-28

## 2019-07-28 RX ORDER — ONDANSETRON 2 MG/ML
4 INJECTION INTRAMUSCULAR; INTRAVENOUS EVERY 30 MIN PRN
Status: DISCONTINUED | OUTPATIENT
Start: 2019-07-28 | End: 2019-07-28 | Stop reason: HOSPADM

## 2019-07-28 RX ADMIN — SODIUM CHLORIDE 500 ML: 9 INJECTION, SOLUTION INTRAVENOUS at 16:37

## 2019-07-28 RX ADMIN — ONDANSETRON 4 MG: 2 INJECTION INTRAMUSCULAR; INTRAVENOUS at 16:38

## 2019-07-28 RX ADMIN — IOPAMIDOL 75 ML: 755 INJECTION, SOLUTION INTRAVENOUS at 17:26

## 2019-07-28 ASSESSMENT — ENCOUNTER SYMPTOMS
DIARRHEA: 1
ABDOMINAL PAIN: 0
NAUSEA: 1
BACK PAIN: 0
BLOOD IN STOOL: 0
SHORTNESS OF BREATH: 0
VOMITING: 0

## 2019-07-28 ASSESSMENT — PAIN SCALES - GENERAL: PAINLEVEL_OUTOF10: 6

## 2019-07-28 ASSESSMENT — PAIN DESCRIPTION - PAIN TYPE: TYPE: ACUTE PAIN

## 2019-07-28 ASSESSMENT — PAIN DESCRIPTION - LOCATION: LOCATION: PELVIS

## 2019-07-28 NOTE — ED PROVIDER NOTES
TAKE 1 TABLET BY MOUTH EVERY DAY, Disp-90 tablet, R-3Normal      carbidopa-levodopa (SINEMET CR)  MG per extended release tablet TAKE 1 TABLET BY MOUTH TWICE DAILY, Disp-180 tablet, R-3**Patient requests 90 days supply**Normal      aspirin 81 MG EC tablet Take 81 mg by mouth daily. NAPROXEN 500 MG EC tablet TAKE 1 TABLET BY MOUTH TWICE DAILY WITH MEALS AS NEEDED, Disp-180 tablet, R-0**Patient requests 90 days supply**Normal               ALLERGIES     Patient has no known allergies. FAMILYHISTORY       Family History   Problem Relation Age of Onset    Cancer Mother         Bowel?  Colon Cancer Mother     Cancer Sister         Bowel?     Colon Cancer Sister     Heart Disease Neg Hx           SOCIAL HISTORY       Social History     Socioeconomic History    Marital status:      Spouse name: None    Number of children: None    Years of education: None    Highest education level: None   Occupational History    None   Social Needs    Financial resource strain: None    Food insecurity:     Worry: None     Inability: None    Transportation needs:     Medical: None     Non-medical: None   Tobacco Use    Smoking status: Former Smoker     Packs/day: 2.00     Years: 30.00     Pack years: 60.00     Last attempt to quit: 5/15/1983     Years since quittin.2    Smokeless tobacco: Never Used   Substance and Sexual Activity    Alcohol use: No    Drug use: No    Sexual activity: Yes     Partners: Female   Lifestyle    Physical activity:     Days per week: None     Minutes per session: None    Stress: None   Relationships    Social connections:     Talks on phone: None     Gets together: None     Attends [employer] service: None     Active member of club or organization: None     Attends meetings of clubs or organizations: None     Relationship status: None    Intimate partner violence:     Fear of current or ex partner: None     Emotionally abused: None     Physically abused: None components within normal limits    Narrative:     Performed at:  [facility]  [address],  [place], [address]   Phone [phone]   URINE RT REFLEX TO CULTURE - Abnormal; Notable for the following components:    Ketones, Urine TRACE (*)     All other components within normal limits    Narrative:     Performed at:  [facility]  [address],  [place], [address]   Phone [phone]   LIPASE    Narrative:     Performed at:  [facility]  [address] [place],  [place], [address]   Phone [phone]   MAGNESIUM    Narrative:     Performed at:  [facility]  [address] [place],  [place], [address]   Phone [phone]       All other labs were within normal range or not returned as of this dictation. EKG: All EKG's are interpreted by the Emergency Department Physician in the absence of a cardiologist.  Please see their note for interpretation of EKG. RADIOLOGY:   Non-plain film images such as CT, Ultrasound and MRI are read by the radiologist. Plain radiographic images are visualized andpreliminarily interpreted by the  ED Provider with the below findings:        Interpretation perthe Radiologist below, if available at the time of this note:    CT ABDOMEN PELVIS W IV CONTRAST Additional Contrast? None   Final Result   1. No acute abnormalities in the abdomen or pelvis. Chronic elevation of the   right hemidiaphragm which may be associated with diaphragmatic paralysis also   previously seen. 2. No acute bowel abnormality. 3. Status post prostatectomy. Stable erectile device. No results found.         PROCEDURES   Unless otherwise noted below, none     Procedures    CRITICAL CARE TIME   N/A    CONSULTS:  None      EMERGENCY DEPARTMENT COURSE and DIFFERENTIAL DIAGNOSIS/MDM:   Vitals:    Vitals:    07/28/19 1550 07/28/19 1831   BP: (!) 159/91 (!) 157/98   Pulse: 78 96   Resp: 16 18   Temp: 96.8 °F (36 °C)    TempSrc: Oral    SpO2: 99%    Weight: 139 lb (63 kg)    Height: 5' 10\" (1.778 m)        Patient was given thefollowing medications:  Medications   0.9 % sodium chloride bolus (0 mLs Intravenous Stopped 7/28/19 1737)   iopamidol (ISOVUE-370) 76 % injection 75 mL (75 mLs Intravenous Given 7/28/19 1726)       Patient presented with some diarrhea that he describes is clear and almost like water that is been ongoing for about 6 months off and on. Was to have a scope in January but did not get a good enough bowel prep and never followed back up with the GI despite them trying to give him another prep. He is now been having some weight loss. Laboratory testing is unremarkable. He had no diarrhea here. Will be sent for stool studies given the duration of symptoms but is urged and needs a follow-up with GI. No mass or obvious change on CT. Low suspicion for C. difficile, infectious cause for his diarrhea, colitis, diverticulitis, obstruction or acute abdomen or other emergent etiology. He will follow-up as an outpatient return here for any worsening of symptoms or problems at home. FINAL IMPRESSION      1.  Diarrhea, unspecified type          DISPOSITION/PLAN   DISPOSITION Decision To Discharge 07/28/2019 06:15:58 PM      PATIENT REFERREDTO:  Janel Brian MD  [address] [place] [address]  [phone]    Schedule an appointment as soon as possible for a visit in 3 days  For re-check    Galina Keene MD  [address] [place] [address]  [phone]    Schedule an appointment as soon as possible for a visit   For re-check    [facility] Emergency Department  [address] [facility]  [phone]    As needed      DISCHARGE MEDICATIONS:  Discharge Medication List as of 7/28/2019  6:21 PM          DISCONTINUED MEDICATIONS:  Discharge Medication List as of 7/28/2019  6:21 PM                 (Please note that portions

## 2019-08-12 RX ORDER — HYDROCODONE BITARTRATE AND ACETAMINOPHEN 5; 325 MG/1; MG/1
1 TABLET ORAL EVERY 6 HOURS PRN
COMMUNITY
End: 2019-09-26 | Stop reason: SDUPTHER

## 2019-08-13 ENCOUNTER — ANESTHESIA EVENT (OUTPATIENT)
Dept: ENDOSCOPY | Age: 79
End: 2019-08-13
Payer: MEDICARE

## 2019-08-30 ENCOUNTER — ANESTHESIA (OUTPATIENT)
Dept: ENDOSCOPY | Age: 79
End: 2019-08-30
Payer: MEDICARE

## 2019-08-30 ENCOUNTER — HOSPITAL ENCOUNTER (OUTPATIENT)
Age: 79
Setting detail: OUTPATIENT SURGERY
Discharge: HOME | End: 2019-08-30
Attending: INTERNAL MEDICINE | Admitting: INTERNAL MEDICINE
Payer: MEDICARE

## 2019-08-30 VITALS
SYSTOLIC BLOOD PRESSURE: 143 MMHG | OXYGEN SATURATION: 98 % | WEIGHT: 144 LBS | BODY MASS INDEX: 20.62 KG/M2 | TEMPERATURE: 98 F | RESPIRATION RATE: 16 BRPM | HEART RATE: 85 BPM | DIASTOLIC BLOOD PRESSURE: 1 MMHG | HEIGHT: 70 IN

## 2019-08-30 VITALS — SYSTOLIC BLOOD PRESSURE: 122 MMHG | DIASTOLIC BLOOD PRESSURE: 67 MMHG | OXYGEN SATURATION: 100 %

## 2019-08-30 PROCEDURE — 3609010300 HC COLONOSCOPY W/BIOPSY SINGLE/MULTIPLE: Performed by: INTERNAL MEDICINE

## 2019-08-30 PROCEDURE — 2709999900 HC NON-CHARGEABLE SUPPLY: Performed by: INTERNAL MEDICINE

## 2019-08-30 PROCEDURE — 3700000000 HC ANESTHESIA ATTENDED CARE: Performed by: INTERNAL MEDICINE

## 2019-08-30 PROCEDURE — 2500000003 HC RX 250 WO HCPCS: Performed by: NURSE ANESTHETIST, CERTIFIED REGISTERED

## 2019-08-30 PROCEDURE — 3700000001 HC ADD 15 MINUTES (ANESTHESIA): Performed by: INTERNAL MEDICINE

## 2019-08-30 PROCEDURE — 2580000003 HC RX 258: Performed by: ANESTHESIOLOGY

## 2019-08-30 PROCEDURE — 7100000010 HC PHASE II RECOVERY - FIRST 15 MIN: Performed by: INTERNAL MEDICINE

## 2019-08-30 PROCEDURE — 2580000003 HC RX 258: Performed by: NURSE ANESTHETIST, CERTIFIED REGISTERED

## 2019-08-30 PROCEDURE — 6360000002 HC RX W HCPCS: Performed by: NURSE ANESTHETIST, CERTIFIED REGISTERED

## 2019-08-30 PROCEDURE — 7100000011 HC PHASE II RECOVERY - ADDTL 15 MIN: Performed by: INTERNAL MEDICINE

## 2019-08-30 RX ORDER — PROPOFOL 10 MG/ML
INJECTION, EMULSION INTRAVENOUS PRN
Status: DISCONTINUED | OUTPATIENT
Start: 2019-08-30 | End: 2019-08-30 | Stop reason: SDUPTHER

## 2019-08-30 RX ORDER — SODIUM CHLORIDE 0.9 % (FLUSH) 0.9 %
10 SYRINGE (ML) INJECTION PRN
Status: DISCONTINUED | OUTPATIENT
Start: 2019-08-30 | End: 2019-08-30 | Stop reason: HOSPADM

## 2019-08-30 RX ORDER — LIDOCAINE HYDROCHLORIDE 10 MG/ML
1 INJECTION, SOLUTION EPIDURAL; INFILTRATION; INTRACAUDAL; PERINEURAL
Status: DISCONTINUED | OUTPATIENT
Start: 2019-08-30 | End: 2019-08-30 | Stop reason: HOSPADM

## 2019-08-30 RX ORDER — SODIUM CHLORIDE 9 MG/ML
INJECTION, SOLUTION INTRAVENOUS CONTINUOUS
Status: DISCONTINUED | OUTPATIENT
Start: 2019-08-30 | End: 2019-08-30 | Stop reason: HOSPADM

## 2019-08-30 RX ORDER — SODIUM CHLORIDE 9 MG/ML
INJECTION, SOLUTION INTRAVENOUS CONTINUOUS PRN
Status: DISCONTINUED | OUTPATIENT
Start: 2019-08-30 | End: 2019-08-30 | Stop reason: SDUPTHER

## 2019-08-30 RX ORDER — SODIUM CHLORIDE 0.9 % (FLUSH) 0.9 %
10 SYRINGE (ML) INJECTION EVERY 12 HOURS SCHEDULED
Status: DISCONTINUED | OUTPATIENT
Start: 2019-08-30 | End: 2019-08-30 | Stop reason: HOSPADM

## 2019-08-30 RX ORDER — LIDOCAINE HYDROCHLORIDE 20 MG/ML
INJECTION, SOLUTION INFILTRATION; PERINEURAL PRN
Status: DISCONTINUED | OUTPATIENT
Start: 2019-08-30 | End: 2019-08-30 | Stop reason: SDUPTHER

## 2019-08-30 RX ADMIN — PROPOFOL 40 MG: 10 INJECTION, EMULSION INTRAVENOUS at 10:33

## 2019-08-30 RX ADMIN — PROPOFOL 40 MG: 10 INJECTION, EMULSION INTRAVENOUS at 10:21

## 2019-08-30 RX ADMIN — PROPOFOL 30 MG: 10 INJECTION, EMULSION INTRAVENOUS at 10:36

## 2019-08-30 RX ADMIN — PHENYLEPHRINE HYDROCHLORIDE 80 MCG: 10 INJECTION INTRAVENOUS at 10:21

## 2019-08-30 RX ADMIN — PHENYLEPHRINE HYDROCHLORIDE 80 MCG: 10 INJECTION INTRAVENOUS at 10:38

## 2019-08-30 RX ADMIN — PROPOFOL 30 MG: 10 INJECTION, EMULSION INTRAVENOUS at 10:27

## 2019-08-30 RX ADMIN — LIDOCAINE HYDROCHLORIDE 100 MG: 20 INJECTION, SOLUTION INFILTRATION; PERINEURAL at 10:21

## 2019-08-30 RX ADMIN — SODIUM CHLORIDE: 9 INJECTION, SOLUTION INTRAVENOUS at 09:42

## 2019-08-30 RX ADMIN — PHENYLEPHRINE HYDROCHLORIDE 80 MCG: 10 INJECTION INTRAVENOUS at 10:35

## 2019-08-30 RX ADMIN — PROPOFOL 30 MG: 10 INJECTION, EMULSION INTRAVENOUS at 10:30

## 2019-08-30 RX ADMIN — PHENYLEPHRINE HYDROCHLORIDE 80 MCG: 10 INJECTION INTRAVENOUS at 10:27

## 2019-08-30 RX ADMIN — PHENYLEPHRINE HYDROCHLORIDE 80 MCG: 10 INJECTION INTRAVENOUS at 10:24

## 2019-08-30 RX ADMIN — SODIUM CHLORIDE: 9 INJECTION, SOLUTION INTRAVENOUS at 10:15

## 2019-08-30 RX ADMIN — PHENYLEPHRINE HYDROCHLORIDE 80 MCG: 10 INJECTION INTRAVENOUS at 10:31

## 2019-08-30 RX ADMIN — PROPOFOL 30 MG: 10 INJECTION, EMULSION INTRAVENOUS at 10:24

## 2019-08-30 ASSESSMENT — PAIN - FUNCTIONAL ASSESSMENT: PAIN_FUNCTIONAL_ASSESSMENT: 0-10

## 2019-08-30 ASSESSMENT — COPD QUESTIONNAIRES: CAT_SEVERITY: MODERATE

## 2019-08-30 ASSESSMENT — ENCOUNTER SYMPTOMS: SHORTNESS OF BREATH: 1

## 2019-08-30 ASSESSMENT — PAIN SCALES - GENERAL: PAINLEVEL_OUTOF10: 0

## 2019-08-30 NOTE — ANESTHESIA PRE PROCEDURE
Department of Anesthesiology  Preprocedure Note       Name:  Liane Ramires   Age:  67 y.o.  :  1940                                          MRN:  [id number]         Date:  2019      Surgeon: Geovanna Agrawal):  Lauren Meléndez MD    Procedure: COLONOSCOPY DIAGNOSTIC (N/A )    Medications prior to admission:   Prior to Admission medications    Medication Sig Start Date End Date Taking? Authorizing Provider   HYDROcodone-acetaminophen (NORCO) 5-325 MG per tablet Take 1 tablet by mouth every 6 hours as needed for Pain. Historical Provider, MD   simvastatin (ZOCOR) 20 MG tablet Take 1 tablet by mouth nightly 19   Rj Branch APRN - CNP   clopidogrel (PLAVIX) 75 MG tablet TAKE 1 TABLET BY MOUTH EVERY DAY 18   Bebe Walker MD   carbidopa-levodopa (SINEMET CR)  MG per extended release tablet TAKE 1 TABLET BY MOUTH TWICE DAILY 18   Fei Anderson MD   aspirin 81 MG EC tablet Take 81 mg by mouth daily. Historical Provider, MD       Current medications:    Current Outpatient Medications   Medication Sig Dispense Refill    HYDROcodone-acetaminophen (NORCO) 5-325 MG per tablet Take 1 tablet by mouth every 6 hours as needed for Pain.  simvastatin (ZOCOR) 20 MG tablet Take 1 tablet by mouth nightly 90 tablet 3    clopidogrel (PLAVIX) 75 MG tablet TAKE 1 TABLET BY MOUTH EVERY DAY 90 tablet 3    carbidopa-levodopa (SINEMET CR)  MG per extended release tablet TAKE 1 TABLET BY MOUTH TWICE DAILY 180 tablet 3    aspirin 81 MG EC tablet Take 81 mg by mouth daily. No current facility-administered medications for this visit.         Allergies:  No Known Allergies    Problem List:    Patient Active Problem List   Diagnosis Code    COPD (chronic obstructive pulmonary disease) J44.9    Coronary artery disease I25.10    Hypertension I10    Hyperlipidemia E78.5    Fever R50.9    SOB (shortness of breath) R06.02    Vertigo R42    Exertional dyspnea R06.09    Paroxysmal atrial fibrillation (HCC) I48.0    Parkinson disease ([facility] Chai [place]ala       Past Medical History:        Diagnosis Date    CAD (coronary artery disease)     CABG and cardiac stents    COPD (chronic obstructive pulmonary disease) ([facility]     Coronary artery bypass     Coronary stent 2003    2    Hyperlipidemia     Hypertension     Osteoarthritis     [facility]    Prostate cancer ([facility] 2004    Rotator cuff syndrome        Past Surgical History:        Procedure Laterality Date    CARDIAC SURGERY      CATARACT REMOVAL WITH IMPLANT      COLONOSCOPY      CORONARY ANGIOPLASTY  1993    x 2    CORONARY ANGIOPLASTY  2013    x 1 stent    CORONARY ARTERY BYPASS GRAFT      , barrett    PROSTATECTOMY      Cured, dr Nico Edwards SIGMOIDOSCOPY N/A 2019    SIGMOIDOSCOPY SCREENING performed by Venancio Meléndez MD [place] [address] [facility]       Social History:    Social History     Tobacco Use    Smoking status: Former Smoker     Packs/day: 2.00     Years: 30.00     Pack years: 60.00     Last attempt to quit: 5/15/1983     Years since quittin.3    Smokeless tobacco: Never Used   Substance Use Topics    Alcohol use: No                                Counseling given: Not Answered      Vital Signs (Current): There were no vitals filed for this visit.                                            BP Readings from Last 3 Encounters:   19 (!) 157/98   19 120/80   19 120/60       NPO Status:                                                                                 BMI:   Wt Readings from Last 3 Encounters:   19 139 lb (63 kg)   19 136 lb (61.7 kg)   19 139 lb (63 kg)     There is no height or weight on file to calculate BMI.    CBC:   Lab Results   Component Value Date    WBC 7.7 2019    RBC 4.12 2019    HGB 12.2 2019    HCT 38.2 2019    MCV 92.7 2019    RDW 14.9 2019     2019       CMP: Lab Results   Component Value Date     07/28/2019    K 4.1 07/28/2019     07/28/2019    CO2 23 07/28/2019    BUN 20 07/28/2019    CREATININE 0.9 07/28/2019    GFRAA >60 07/28/2019    GFRAA >60 05/07/2013    AGRATIO 1.4 07/28/2019    LABGLOM >60 07/28/2019    GLUCOSE 100 07/28/2019    PROT 6.7 07/28/2019    PROT 6.8 10/27/2010    CALCIUM 9.2 07/28/2019    BILITOT 0.5 07/28/2019    ALKPHOS 79 07/28/2019    AST 10 07/28/2019    ALT <5 07/28/2019       POC Tests: No results for input(s): POCGLU, POCNA, POCK, POCCL, POCBUN, POCHEMO, POCHCT in the last 72 hours. Coags:   Lab Results   Component Value Date    PROTIME 10.3 05/18/2015    INR 0.96 05/18/2015    APTT 29.8 05/18/2015       HCG (If Applicable): No results found for: PREGTESTUR, PREGSERUM, HCG, HCGQUANT     ABGs: No results found for: PHART, PO2ART, NVS4GWN, FIU9XRM, BEART, Y7UUGVWO     Type & Screen (If Applicable):  No results found for: LABABO, [address]    Anesthesia Evaluation  Patient summary reviewed and Nursing notes reviewed  Airway: Mallampati: II  TM distance: >3 FB   Neck ROM: full  Mouth opening: > = 3 FB Dental:          Pulmonary:   (+) COPD: moderate,  shortness of breath: no interval change,                             Cardiovascular:  Exercise tolerance: good (>4 METS),   (+) hypertension: moderate, CAD: non-obstructive, CABG/stent (STENT 2 Y AGO):, LORENZ:,                   Neuro/Psych:                ROS comment: Parkinson  Muscle weakness GI/Hepatic/Renal:             Endo/Other:                     Abdominal:           Vascular:                                        Anesthesia Plan      MAC     ASA 2       Induction: intravenous. MIPS: Prophylactic antiemetics administered. Anesthetic plan and risks discussed with patient. Plan discussed with CRNA.     Attending anesthesiologist reviewed and agrees with Maryanne Negro MD   8/30/2019

## 2019-09-26 ENCOUNTER — OFFICE VISIT (OUTPATIENT)
Dept: INTERNAL MEDICINE CLINIC | Age: 79
End: 2019-09-26
Payer: MEDICARE

## 2019-09-26 VITALS
DIASTOLIC BLOOD PRESSURE: 58 MMHG | SYSTOLIC BLOOD PRESSURE: 100 MMHG | HEART RATE: 60 BPM | BODY MASS INDEX: 19.08 KG/M2 | WEIGHT: 133 LBS

## 2019-09-26 DIAGNOSIS — G20 PRIMARY PARKINSONISM (HCC): ICD-10-CM

## 2019-09-26 DIAGNOSIS — G89.4 CHRONIC PAIN DISORDER: Primary | ICD-10-CM

## 2019-09-26 PROCEDURE — 4040F PNEUMOC VAC/ADMIN/RCVD: CPT | Performed by: INTERNAL MEDICINE

## 2019-09-26 PROCEDURE — 1123F ACP DISCUSS/DSCN MKR DOCD: CPT | Performed by: INTERNAL MEDICINE

## 2019-09-26 PROCEDURE — G8420 CALC BMI NORM PARAMETERS: HCPCS | Performed by: INTERNAL MEDICINE

## 2019-09-26 PROCEDURE — 99213 OFFICE O/P EST LOW 20 MIN: CPT | Performed by: INTERNAL MEDICINE

## 2019-09-26 PROCEDURE — G8427 DOCREV CUR MEDS BY ELIG CLIN: HCPCS | Performed by: INTERNAL MEDICINE

## 2019-09-26 PROCEDURE — 1036F TOBACCO NON-USER: CPT | Performed by: INTERNAL MEDICINE

## 2019-09-26 PROCEDURE — G8598 ASA/ANTIPLAT THER USED: HCPCS | Performed by: INTERNAL MEDICINE

## 2019-09-26 RX ORDER — CARBIDOPA AND LEVODOPA 25; 100 MG/1; MG/1
1 TABLET, EXTENDED RELEASE ORAL 2 TIMES DAILY
Qty: 180 TABLET | Refills: 3 | Status: SHIPPED | OUTPATIENT
Start: 2019-09-26 | End: 2020-03-31

## 2019-09-26 RX ORDER — HYDROCODONE BITARTRATE AND ACETAMINOPHEN 5; 325 MG/1; MG/1
1 TABLET ORAL EVERY 6 HOURS PRN
Qty: 90 TABLET | Refills: 0 | Status: SHIPPED | OUTPATIENT
Start: 2019-09-26 | End: 2019-10-26

## 2019-09-26 NOTE — PROGRESS NOTES
2003    2    Hyperlipidemia     Hypertension     Osteoarthritis     Parkinson disease ([facility] 5/28/2019    Prostate cancer ([facility] 2004    Rotator cuff syndrome             BP (!) 100/58   Pulse 60   Wt 133 lb (60.3 kg)   BMI 19.08 kg/m²     General Appearance:  Alert, cooperative, no distress, appears stated age    Neck  Supple          cardiac   S1 and S2 normal no murmur or rub   Abdomen  soft no masses     lungs   clear    Neurological   No cogwheel rigidity, gait has improved, facial expression   Stable from prior visit , does have a pill rolling tremor    Musculoskelet    range of motion in the shoulder improved osteoarthritis knees Heberden's and Lashay's nodes     psych   Difficult to assess, has somewhat masked facies secondary to Parkinson's         No components found for: CHLPL  Lab Results   Component Value Date    TRIG 95 10/06/2018    TRIG 96 03/31/2018    TRIG 86 05/02/2017     Lab Results   Component Value Date    HDL 46 10/06/2018    HDL 47 03/31/2018    HDL 43 05/02/2017     Lab Results   Component Value Date    LDLCALC 74 10/06/2018    LDLCALC 70 03/31/2018    LDLCALC 73 05/02/2017     Lab Results   Component Value Date    LABVLDL 19 10/06/2018    LABVLDL 19 03/31/2018    LABVLDL 17 05/02/2017     Lab Results   Component Value Date    CREATININE 0.9 07/28/2019       Periodic Controlled Substance Monitoring: Obtaining appropriate analgesic effect of treatment. Elieser Jordan MD)       His blood pressure is controlled, continue current antihypertensive treatment and has follow-up with cardiology next week    I had a discussion with him regarding his chronic use of pain medications. .  Alternative treatments were discussed. No change in chronic pain medication treatment prescription provided               Diagnosis Orders   1. Chronic pain disorder  HYDROcodone-acetaminophen (NORCO) 5-325 MG per tablet   2.  Primary Parkinsonism ([facility]

## 2019-11-21 ENCOUNTER — OFFICE VISIT (OUTPATIENT)
Dept: CARDIOLOGY CLINIC | Age: 79
End: 2019-11-21
Payer: MEDICARE

## 2019-11-21 VITALS
HEIGHT: 70 IN | BODY MASS INDEX: 19.71 KG/M2 | WEIGHT: 137.7 LBS | DIASTOLIC BLOOD PRESSURE: 68 MMHG | SYSTOLIC BLOOD PRESSURE: 116 MMHG | HEART RATE: 80 BPM

## 2019-11-21 DIAGNOSIS — I10 ESSENTIAL HYPERTENSION: Chronic | ICD-10-CM

## 2019-11-21 DIAGNOSIS — E78.49 OTHER HYPERLIPIDEMIA: Chronic | ICD-10-CM

## 2019-11-21 DIAGNOSIS — J42 CHRONIC BRONCHITIS, UNSPECIFIED CHRONIC BRONCHITIS TYPE (HCC): ICD-10-CM

## 2019-11-21 DIAGNOSIS — G20 PARKINSON DISEASE (HCC): Chronic | ICD-10-CM

## 2019-11-21 DIAGNOSIS — I25.10 CORONARY ARTERY DISEASE INVOLVING NATIVE CORONARY ARTERY OF NATIVE HEART WITHOUT ANGINA PECTORIS: Primary | Chronic | ICD-10-CM

## 2019-11-21 DIAGNOSIS — E78.5 HYPERLIPIDEMIA, UNSPECIFIED HYPERLIPIDEMIA TYPE: Chronic | ICD-10-CM

## 2019-11-21 PROCEDURE — G8420 CALC BMI NORM PARAMETERS: HCPCS | Performed by: INTERNAL MEDICINE

## 2019-11-21 PROCEDURE — 3023F SPIROM DOC REV: CPT | Performed by: INTERNAL MEDICINE

## 2019-11-21 PROCEDURE — G8926 SPIRO NO PERF OR DOC: HCPCS | Performed by: INTERNAL MEDICINE

## 2019-11-21 PROCEDURE — G8598 ASA/ANTIPLAT THER USED: HCPCS | Performed by: INTERNAL MEDICINE

## 2019-11-21 PROCEDURE — 1123F ACP DISCUSS/DSCN MKR DOCD: CPT | Performed by: INTERNAL MEDICINE

## 2019-11-21 PROCEDURE — G8427 DOCREV CUR MEDS BY ELIG CLIN: HCPCS | Performed by: INTERNAL MEDICINE

## 2019-11-21 PROCEDURE — 99214 OFFICE O/P EST MOD 30 MIN: CPT | Performed by: INTERNAL MEDICINE

## 2019-11-21 PROCEDURE — G8484 FLU IMMUNIZE NO ADMIN: HCPCS | Performed by: INTERNAL MEDICINE

## 2019-11-21 PROCEDURE — 1036F TOBACCO NON-USER: CPT | Performed by: INTERNAL MEDICINE

## 2019-11-21 PROCEDURE — 4040F PNEUMOC VAC/ADMIN/RCVD: CPT | Performed by: INTERNAL MEDICINE

## 2019-11-21 RX ORDER — HYDROCODONE BITARTRATE AND ACETAMINOPHEN 5; 325 MG/1; MG/1
1 TABLET ORAL EVERY 6 HOURS PRN
COMMUNITY
End: 2019-12-26 | Stop reason: SDUPTHER

## 2019-11-21 RX ORDER — CLOPIDOGREL BISULFATE 75 MG/1
75 TABLET ORAL DAILY
Qty: 90 TABLET | Refills: 3 | Status: SHIPPED | OUTPATIENT
Start: 2019-11-21 | End: 2020-06-04 | Stop reason: ALTCHOICE

## 2019-11-21 RX ORDER — SIMVASTATIN 20 MG
20 TABLET ORAL NIGHTLY
Qty: 90 TABLET | Refills: 3 | Status: SHIPPED | OUTPATIENT
Start: 2019-11-21 | End: 2020-06-04 | Stop reason: ALTCHOICE

## 2019-12-26 ENCOUNTER — OFFICE VISIT (OUTPATIENT)
Dept: INTERNAL MEDICINE CLINIC | Age: 79
End: 2019-12-26
Payer: MEDICARE

## 2019-12-26 VITALS
SYSTOLIC BLOOD PRESSURE: 118 MMHG | HEART RATE: 68 BPM | BODY MASS INDEX: 19.8 KG/M2 | DIASTOLIC BLOOD PRESSURE: 60 MMHG | WEIGHT: 138 LBS

## 2019-12-26 DIAGNOSIS — G20 PRIMARY PARKINSONISM (HCC): ICD-10-CM

## 2019-12-26 DIAGNOSIS — G89.4 CHRONIC PAIN DISORDER: Primary | ICD-10-CM

## 2019-12-26 DIAGNOSIS — R13.13 PHARYNGEAL DYSPHAGIA: ICD-10-CM

## 2019-12-26 PROCEDURE — G8427 DOCREV CUR MEDS BY ELIG CLIN: HCPCS | Performed by: INTERNAL MEDICINE

## 2019-12-26 PROCEDURE — 99213 OFFICE O/P EST LOW 20 MIN: CPT | Performed by: INTERNAL MEDICINE

## 2019-12-26 PROCEDURE — 1036F TOBACCO NON-USER: CPT | Performed by: INTERNAL MEDICINE

## 2019-12-26 PROCEDURE — 1123F ACP DISCUSS/DSCN MKR DOCD: CPT | Performed by: INTERNAL MEDICINE

## 2019-12-26 PROCEDURE — G8484 FLU IMMUNIZE NO ADMIN: HCPCS | Performed by: INTERNAL MEDICINE

## 2019-12-26 PROCEDURE — G8598 ASA/ANTIPLAT THER USED: HCPCS | Performed by: INTERNAL MEDICINE

## 2019-12-26 PROCEDURE — G8420 CALC BMI NORM PARAMETERS: HCPCS | Performed by: INTERNAL MEDICINE

## 2019-12-26 PROCEDURE — 4040F PNEUMOC VAC/ADMIN/RCVD: CPT | Performed by: INTERNAL MEDICINE

## 2019-12-26 RX ORDER — HYDROCODONE BITARTRATE AND ACETAMINOPHEN 5; 325 MG/1; MG/1
1 TABLET ORAL EVERY 6 HOURS PRN
Qty: 90 TABLET | Refills: 0 | Status: SHIPPED | OUTPATIENT
Start: 2019-12-26 | End: 2020-01-25

## 2019-12-26 SDOH — ECONOMIC STABILITY: FOOD INSECURITY: WITHIN THE PAST 12 MONTHS, YOU WORRIED THAT YOUR FOOD WOULD RUN OUT BEFORE YOU GOT MONEY TO BUY MORE.: NEVER TRUE

## 2019-12-26 SDOH — ECONOMIC STABILITY: FOOD INSECURITY: WITHIN THE PAST 12 MONTHS, THE FOOD YOU BOUGHT JUST DIDN'T LAST AND YOU DIDN'T HAVE MONEY TO GET MORE.: NEVER TRUE

## 2019-12-26 SDOH — ECONOMIC STABILITY: INCOME INSECURITY: HOW HARD IS IT FOR YOU TO PAY FOR THE VERY BASICS LIKE FOOD, HOUSING, MEDICAL CARE, AND HEATING?: NOT HARD AT ALL

## 2019-12-26 SDOH — ECONOMIC STABILITY: TRANSPORTATION INSECURITY
IN THE PAST 12 MONTHS, HAS THE LACK OF TRANSPORTATION KEPT YOU FROM MEDICAL APPOINTMENTS OR FROM GETTING MEDICATIONS?: NO

## 2019-12-26 SDOH — ECONOMIC STABILITY: TRANSPORTATION INSECURITY
IN THE PAST 12 MONTHS, HAS LACK OF TRANSPORTATION KEPT YOU FROM MEETINGS, WORK, OR FROM GETTING THINGS NEEDED FOR DAILY LIVING?: NO

## 2020-03-31 ENCOUNTER — TELEMEDICINE (OUTPATIENT)
Dept: INTERNAL MEDICINE CLINIC | Age: 80
End: 2020-03-31
Payer: MEDICARE

## 2020-03-31 VITALS — HEART RATE: 87 BPM | SYSTOLIC BLOOD PRESSURE: 138 MMHG | DIASTOLIC BLOOD PRESSURE: 87 MMHG

## 2020-03-31 PROCEDURE — 99214 OFFICE O/P EST MOD 30 MIN: CPT | Performed by: INTERNAL MEDICINE

## 2020-03-31 RX ORDER — CARBIDOPA AND LEVODOPA 25; 100 MG/1; MG/1
1 TABLET, ORALLY DISINTEGRATING ORAL 3 TIMES DAILY
Qty: 90 TABLET | Refills: 3 | Status: ON HOLD
Start: 2020-03-31 | End: 2021-02-02 | Stop reason: HOSPADM

## 2020-04-01 ENCOUNTER — TELEPHONE (OUTPATIENT)
Dept: INTERNAL MEDICINE CLINIC | Age: 80
End: 2020-04-01

## 2020-04-01 RX ORDER — HYDROCODONE BITARTRATE AND ACETAMINOPHEN 5; 325 MG/1; MG/1
1 TABLET ORAL EVERY 8 HOURS PRN
Qty: 90 TABLET | Refills: 0 | Status: SHIPPED | OUTPATIENT
Start: 2020-04-01 | End: 2020-05-01

## 2020-04-01 NOTE — TELEPHONE ENCOUNTER
Patient requesting a medication refill.   Medication: HYDROcodone-acetaminophen (Patsey Icard) 5-325 MG per tablet  Pharmacy: [address], [address] [place] [address] [phone] Jose Montejo [phone]  Last office visit: 3/31/2020  Next office visit: Visit date not found

## 2020-06-03 ENCOUNTER — TELEPHONE (OUTPATIENT)
Dept: CARDIOLOGY CLINIC | Age: 80
End: 2020-06-03

## 2020-06-03 NOTE — TELEPHONE ENCOUNTER
MAGED.  Called patient to confirm appointment and patient's wife Casa Villa said that her daughter Keyla Centeno and her son had flu-like symptoms at the beginning of March. Patient's daughter Keyla Centeno came to the ER at [facility] at the beginning of March and was told she had the flu. They did not test her for Covid. Ana Sky the grandson was tested for covid which was negative. Chloé Murray per patient's wife has deteriorated in the past 2 months and needs to be seen. He saw Juan Pablo Miles who diagnosed him with Parkinson's. He has lost weight from 144 down to 122 pounds. Patient and wife have no symptoms.       corey

## 2020-06-04 ENCOUNTER — OFFICE VISIT (OUTPATIENT)
Dept: CARDIOLOGY CLINIC | Age: 80
End: 2020-06-04
Payer: MEDICARE

## 2020-06-04 VITALS
HEART RATE: 80 BPM | SYSTOLIC BLOOD PRESSURE: 88 MMHG | WEIGHT: 122.3 LBS | HEIGHT: 69 IN | BODY MASS INDEX: 18.11 KG/M2 | DIASTOLIC BLOOD PRESSURE: 58 MMHG

## 2020-06-04 PROCEDURE — 99214 OFFICE O/P EST MOD 30 MIN: CPT | Performed by: INTERNAL MEDICINE

## 2020-06-04 RX ORDER — MIDODRINE HYDROCHLORIDE 2.5 MG/1
2.5 TABLET ORAL 3 TIMES DAILY
COMMUNITY
End: 2020-06-04

## 2020-06-04 RX ORDER — MIDODRINE HYDROCHLORIDE 5 MG/1
5 TABLET ORAL 3 TIMES DAILY
Qty: 270 TABLET | Refills: 0 | Status: SHIPPED
Start: 2020-06-04 | End: 2021-01-07 | Stop reason: CLARIF

## 2020-06-04 NOTE — PROGRESS NOTES
[facility]   Cardiac Follow up    Referring Provider:  Bryanna Miranda MD     Chief Complaint   Patient presents with    Coronary Artery Disease    Hyperlipidemia    Hypotension      History of Present Illness:  Mr. Ginna Acosta is a 79 y.o. gentleman with a history of CAD, hypertension, hyperlipidemia. In November 2013, he had stent placement to the RCA. He also has copd, managment by Terrell Morgan. Also has worsening Parkinson symptoms. Today, he states , he has lost almost 20 lbs. He states he cannot eat, chokes on water, and medications. Difficulty swallowing. Feels his tremors are worsening and weakness. His wife states he is falling at home frequently, she has had difficulty getting him back up. He denies exertional chest pain, LORENZ/PND, palpitations, edema. He has difficulty standing, tremors noted to right hand and leg. Past Medical History:   has a past medical history of CAD (coronary artery disease), COPD (chronic obstructive pulmonary disease) ([facility] Coronary artery bypass, Coronary stent, Hyperlipidemia, Hypertension, Osteoarthritis, Parkinson disease ([facility] Prostate cancer ([facility] and Rotator cuff syndrome. Surgical History:   has a past surgical history that includes Prostatectomy (2004); Colonoscopy (2005); Coronary artery bypass graft; Cardiac surgery (1998); Coronary angioplasty (1993); Coronary angioplasty (2013); Cataract removal with implant (2015); sigmoidoscopy (N/A, 1/25/2019); and Colonoscopy (N/A, 8/30/2019). Social History:   reports that he quit smoking about 37 years ago. He has a 60.00 pack-year smoking history. He has never used smokeless tobacco. He reports that he does not drink alcohol or use drugs. Family History:  family history includes Cancer in his mother and sister; Colon Cancer in his mother and sister.      Home Medications:     Current Outpatient Medications   Medication Sig Dispense Refill    midodrine (PROAMATINE) 2.5 MG tablet Take 2.5 mg by

## 2020-06-04 NOTE — PATIENT INSTRUCTIONS
Try Boost, Ensure or other nutritional supplement.         Stop simvastatin (ZOCOR) 20 MG tablet    Stop clopidogrel (PLAVIX) 75 MG tablet

## 2020-06-08 ENCOUNTER — TELEPHONE (OUTPATIENT)
Dept: CARDIOLOGY CLINIC | Age: 80
End: 2020-06-08

## 2020-06-09 NOTE — TELEPHONE ENCOUNTER
Spoke to Mrs Casper Reeder and she understood- she will call us if he has any other symptoms or not getting better. She will also get with Dr Omero Churchill as well.

## 2020-06-17 ENCOUNTER — TELEPHONE (OUTPATIENT)
Dept: INTERNAL MEDICINE CLINIC | Age: 80
End: 2020-06-17

## 2020-06-19 ENCOUNTER — TELEPHONE (OUTPATIENT)
Dept: CARDIOLOGY CLINIC | Age: 80
End: 2020-06-19

## 2020-06-19 ENCOUNTER — OFFICE VISIT (OUTPATIENT)
Dept: INTERNAL MEDICINE CLINIC | Age: 80
End: 2020-06-19
Payer: MEDICARE

## 2020-06-19 VITALS
TEMPERATURE: 98.1 F | WEIGHT: 120 LBS | BODY MASS INDEX: 17.72 KG/M2 | HEART RATE: 86 BPM | DIASTOLIC BLOOD PRESSURE: 56 MMHG | OXYGEN SATURATION: 96 % | SYSTOLIC BLOOD PRESSURE: 90 MMHG

## 2020-06-19 LAB
ALBUMIN SERPL-MCNC: 3.7 G/DL (ref 3.4–5)
ANION GAP SERPL CALCULATED.3IONS-SCNC: 13 MMOL/L (ref 3–16)
BASOPHILS ABSOLUTE: 0 K/UL (ref 0–0.2)
BASOPHILS RELATIVE PERCENT: 0.5 %
BUN BLDV-MCNC: 20 MG/DL (ref 7–20)
CALCIUM SERPL-MCNC: 8.9 MG/DL (ref 8.3–10.6)
CHLORIDE BLD-SCNC: 100 MMOL/L (ref 99–110)
CO2: 25 MMOL/L (ref 21–32)
CREAT SERPL-MCNC: 1.2 MG/DL (ref 0.8–1.3)
EOSINOPHILS ABSOLUTE: 0.2 K/UL (ref 0–0.6)
EOSINOPHILS RELATIVE PERCENT: 3.3 %
GFR AFRICAN AMERICAN: >60
GFR NON-AFRICAN AMERICAN: 58
GLUCOSE BLD-MCNC: 97 MG/DL (ref 70–99)
HCT VFR BLD CALC: 33.5 % (ref 40.5–52.5)
HEMOGLOBIN: 11.1 G/DL (ref 13.5–17.5)
LYMPHOCYTES ABSOLUTE: 1.5 K/UL (ref 1–5.1)
LYMPHOCYTES RELATIVE PERCENT: 25.9 %
MCH RBC QN AUTO: 29.7 PG (ref 26–34)
MCHC RBC AUTO-ENTMCNC: 33 G/DL (ref 31–36)
MCV RBC AUTO: 90 FL (ref 80–100)
MONOCYTES ABSOLUTE: 0.4 K/UL (ref 0–1.3)
MONOCYTES RELATIVE PERCENT: 6.4 %
NEUTROPHILS ABSOLUTE: 3.7 K/UL (ref 1.7–7.7)
NEUTROPHILS RELATIVE PERCENT: 63.9 %
PDW BLD-RTO: 14.4 % (ref 12.4–15.4)
PHOSPHORUS: 3.3 MG/DL (ref 2.5–4.9)
PLATELET # BLD: 164 K/UL (ref 135–450)
PMV BLD AUTO: 9 FL (ref 5–10.5)
POTASSIUM SERPL-SCNC: 3.8 MMOL/L (ref 3.5–5.1)
RBC # BLD: 3.72 M/UL (ref 4.2–5.9)
SODIUM BLD-SCNC: 138 MMOL/L (ref 136–145)
TSH REFLEX FT4: 2.67 UIU/ML (ref 0.27–4.2)
WBC # BLD: 5.8 K/UL (ref 4–11)

## 2020-06-19 PROCEDURE — G8510 SCR DEP NEG, NO PLAN REQD: HCPCS | Performed by: INTERNAL MEDICINE

## 2020-06-19 PROCEDURE — 99214 OFFICE O/P EST MOD 30 MIN: CPT | Performed by: INTERNAL MEDICINE

## 2020-06-19 PROCEDURE — 3288F FALL RISK ASSESSMENT DOCD: CPT | Performed by: INTERNAL MEDICINE

## 2020-06-19 RX ORDER — FLUDROCORTISONE ACETATE 0.1 MG/1
0.1 TABLET ORAL DAILY
COMMUNITY
End: 2020-10-08

## 2020-06-19 ASSESSMENT — PATIENT HEALTH QUESTIONNAIRE - PHQ9
2. FEELING DOWN, DEPRESSED OR HOPELESS: 0
SUM OF ALL RESPONSES TO PHQ QUESTIONS 1-9: 1
SUM OF ALL RESPONSES TO PHQ QUESTIONS 1-9: 1
1. LITTLE INTEREST OR PLEASURE IN DOING THINGS: 1
SUM OF ALL RESPONSES TO PHQ9 QUESTIONS 1 & 2: 1

## 2020-07-10 ENCOUNTER — TELEPHONE (OUTPATIENT)
Dept: INTERNAL MEDICINE CLINIC | Age: 80
End: 2020-07-10

## 2020-07-10 NOTE — TELEPHONE ENCOUNTER
[facility] with [place] [facility] called and states patient's wife is also requesting a wheel chair.

## 2020-07-10 NOTE — TELEPHONE ENCOUNTER
Pt's wife called requesting script for Depends be sent to [place] [facility] but did not have any other info. She gave me pt'sr  Raven Newberry phn#:[phone] and I was able to call Mattie Hollins and get adtl information.     Script for Wang's Depends and script for a Pro Stephany needs faxed to [facility] Ta Srinivasan's phn#:[phone] [facility]

## 2020-09-10 ENCOUNTER — TELEPHONE (OUTPATIENT)
Dept: CARDIOLOGY CLINIC | Age: 80
End: 2020-09-10

## 2020-09-10 NOTE — TELEPHONE ENCOUNTER
Spoke with patient's wife states that her  took his aspirin this morning, and that on 9/14/20 he is having his lower teeth pulled then 10 days later he will be having his upper teeth pulled. Wants to know if he should do the same thing hold aspirin 4 days prior or remain off aspirin until all teeth are pulled please advise.

## 2020-09-10 NOTE — TELEPHONE ENCOUNTER
Wife calling, Isabella Craft is still taking aspirin daily and he will be having teeth pulled on 9-14. Should he stop taking this medication? Please call to advise. Thank you.

## 2020-10-08 ENCOUNTER — OFFICE VISIT (OUTPATIENT)
Dept: CARDIOLOGY CLINIC | Age: 80
End: 2020-10-08
Payer: COMMERCIAL

## 2020-10-08 VITALS
SYSTOLIC BLOOD PRESSURE: 142 MMHG | BODY MASS INDEX: 18.22 KG/M2 | HEART RATE: 88 BPM | HEIGHT: 69 IN | OXYGEN SATURATION: 96 % | WEIGHT: 123 LBS | DIASTOLIC BLOOD PRESSURE: 90 MMHG

## 2020-10-08 PROBLEM — I95.9 HYPOTENSION: Status: ACTIVE | Noted: 2020-10-08

## 2020-10-08 PROCEDURE — 99214 OFFICE O/P EST MOD 30 MIN: CPT | Performed by: INTERNAL MEDICINE

## 2020-10-08 RX ORDER — FLUDROCORTISONE ACETATE 0.1 MG/1
TABLET ORAL
Qty: 180 TABLET | Refills: 3 | Status: SHIPPED
Start: 2020-10-08 | End: 2021-03-12 | Stop reason: ALTCHOICE

## 2020-10-08 NOTE — PROGRESS NOTES
[address]   Cardiac Follow up    Referring Provider:  Ric James MD     Chief Complaint   Patient presents with    Coronary Artery Disease    3 Month Follow-Up     still having some dizziness going on       History of Present Illness:  Mr. Vamshi Rodriguez is a 79 y.o. gentleman with a history of CAD, hypertension, hyperlipidemia. In November 2013, he had stent placement to the RCA. He also has copd, managment by Hector Miller. Also has worsening Parkinson symptoms. Today, he states he has been feeling light headed when standing. He has difficulty standing, tremors noted to right hand and leg. He is falling at home frequently. He denies exertional chest pain, LORENZ/PND, palpitations, edema. Past Medical History:   has a past medical history of CAD (coronary artery disease), COPD (chronic obstructive pulmonary disease) ([facility] [address] Coronary artery bypass, Coronary stent, Hyperlipidemia, Hypertension, Osteoarthritis, Parkinson disease ([facility] [address] Prostate cancer ([facility] [address] and Rotator cuff syndrome. Surgical History:   has a past surgical history that includes Prostatectomy (2004); Colonoscopy (2005); Coronary artery bypass graft; Cardiac surgery (1998); Coronary angioplasty (1993); Coronary angioplasty (2013); Cataract removal with implant (2015); sigmoidoscopy (N/A, 1/25/2019); and Colonoscopy (N/A, 8/30/2019). Social History:   reports that he quit smoking about 37 years ago. He has a 60.00 pack-year smoking history. He has never used smokeless tobacco. He reports that he does not drink alcohol or use drugs. Family History:  family history includes Cancer in his mother and sister; Colon Cancer in his mother and sister.      Home Medications:     Current Outpatient Medications   Medication Sig Dispense Refill    Diapers & Supplies MISC 1 each by Does not apply route 3 times daily Adult diapers or depends 100 each 5    Tri Cane MISC by Does not apply route 3 prong cane 1 each 0    fludrocortisone (FLORINEF) 0.1 MG tablet Take 0.1 mg by mouth daily      midodrine (PROAMATINE) 5 MG tablet Take 1 tablet by mouth 3 times daily (Patient taking differently: Take 2.5 mg by mouth 3 times daily ) 270 tablet 0    carbidopa-levodopa (PARCOPA)  MG TBDP Take 1 tablet by mouth 3 times daily 90 tablet 3    aspirin 81 MG EC tablet Take 81 mg by mouth daily. No current facility-administered medications for this visit. Allergies:  Patient has no known allergies. Review of Systems:   · Constitutional: 20lb weight loss, fatigue    · Eyes: No visual changes or diplopia. No scleral icterus. · ENT: No Headaches, hearing loss or vertigo. No mouth sores or sore throat. · Cardiovascular: Reviewed in HPI  · Respiratory: No cough or wheezing, no sputum production. No hematemesis. · Gastrointestinal: No abdominal pain, appetite loss, blood in stools. No change in bowel or bladder habits. · Genitourinary: No dysuria, trouble voiding, or hematuria. · Musculoskeletal:  +weakness , unsteady gait   · Integumentary: No rash or pruritis. · Neurological: No headache, diplopia, change in muscle strength, numbness or tingling. No change in gait, balance, coordination, mood, affect, memory, mentation, behavior. +memory loss   · Psychiatric: No anxiety, no depression. · Endocrine: No malaise, fatigue or temperature intolerance. No excessive thirst, fluid intake, or urination. + tremors   · Hematologic/Lymphatic: No abnormal bruising or bleeding, blood clots or swollen lymph nodes. · Allergic/Immunologic: No nasal congestion or hives.     Physical Examination:    Vitals:    10/08/20 1351   BP: (!) 142/90   Pulse: 88   SpO2: 96%        Constitutional and General Appearance: NAD  Skin:good turgor,intact without lesions  HEENT: EOMI ,normal  Neck:no JVD  Markedly cacchectic     Respiratory:  · Normal excursion and expansion without use of accessory muscles  · Resp Auscultation: Normal breath sounds without dullness  Cardiovascular:  · The apical impulses not displaced  · Heart tones are crisp and normal  · Cervical veins are not engorged  · The carotid upstroke is normal in amplitude and contour without delay or bruit  · Normal heart tones  · Peripheral pulses are symmetrical and full  · There is no clubbing, cyanosis of the extremities. · No edema  · Femoral Arteries: 2+ and equal  · Pedal Pulses: 2+ and equal   Abdomen:  · No masses or tenderness  · Liver/Spleen: No Abnormalities Noted  Neurological/Psychiatric:persistent right hand tremor,much worse with generalized tremors  · Alert and oriented in all spheres  · Weakness, tremors     Assessment:  1. Coronary artery disease/CABG: Stable, no anginal symptoms  Cath 2009> Patent grafts and previously placed stents. GXT myoview 2012> normal perfusion, EF 70%. LHC 11/20/13> patent LAD stent, patent intermediate, patent distal RCA stent, normal EF 80%, mid RCA treated w/ 3. 5 x 15 w/ excellent results. Unable to place closure device so manual pressure used. 2. Hyperlipidemia: Appetite poor, weight loss- stoped Zocor    3. Hypotension : Increase florinef . BP (!) 142/90 (Site: Right Upper Arm, Position: Sitting, Cuff Size: Medium Adult)   Pulse 88   Ht 5' 9\" (1.753 m)   Wt 123 lb (55.8 kg)   SpO2 96%   BMI 18.16 kg/m²    Sitting 110  Standing 80      4. Tremors/Parkinson's : He has been diagnosed with Parkinson's, seeing-  Dr. Tracie Gitelman:  John Herman has ongoing struggles with his Parkinson's symptoms. Marked malnutrition with inability to eat due to choking  Increase fludrocortisone (FLORINEF) 0.1 MG tablet- to Take 2 tablets by mouth daily   Try Boost, Ensure or other nutritional supplement. Return for regular follow up in 6 months. I appreciate the opportunity of cooperating in the care of this individual.    Roopa Lizama.  Indira Allen M.D., [facility]    Patient's problem list, medications, allergies, past medical, surgical, social and family histories were reviewed and updated as appropriate. Scribe's attestation: This note was scribed in the presence of Dr. Waleska Colbert MD, by Lilibeth Yuen RN. The scribe's documentation has been prepared under my direction and personally reviewed by me in its entirety. I confirm that the note above accurately reflects all work, treatment, procedures, and medical decision making performed by me.

## 2020-10-16 ENCOUNTER — TELEPHONE (OUTPATIENT)
Dept: RHEUMATOLOGY | Age: 80
End: 2020-10-16

## 2020-12-16 ENCOUNTER — OFFICE VISIT (OUTPATIENT)
Dept: INTERNAL MEDICINE CLINIC | Age: 80
End: 2020-12-16
Payer: COMMERCIAL

## 2020-12-16 VITALS
BODY MASS INDEX: 18.75 KG/M2 | TEMPERATURE: 97.3 F | SYSTOLIC BLOOD PRESSURE: 126 MMHG | OXYGEN SATURATION: 96 % | WEIGHT: 127 LBS | HEART RATE: 90 BPM | DIASTOLIC BLOOD PRESSURE: 60 MMHG

## 2020-12-16 PROCEDURE — 17000 DESTRUCT PREMALG LESION: CPT | Performed by: INTERNAL MEDICINE

## 2020-12-16 PROCEDURE — 99213 OFFICE O/P EST LOW 20 MIN: CPT | Performed by: INTERNAL MEDICINE

## 2021-01-07 ENCOUNTER — TELEPHONE (OUTPATIENT)
Dept: CARDIOLOGY CLINIC | Age: 81
End: 2021-01-07

## 2021-01-07 ENCOUNTER — HOSPITAL ENCOUNTER (INPATIENT)
Age: 81
LOS: 2 days | Discharge: HOME | DRG: 246 | End: 2021-01-09
Attending: EMERGENCY MEDICINE | Admitting: INTERNAL MEDICINE
Payer: COMMERCIAL

## 2021-01-07 ENCOUNTER — APPOINTMENT (OUTPATIENT)
Dept: GENERAL RADIOLOGY | Age: 81
DRG: 246 | End: 2021-01-07
Payer: COMMERCIAL

## 2021-01-07 DIAGNOSIS — Z95.1 HISTORY OF CORONARY ARTERY BYPASS GRAFT: ICD-10-CM

## 2021-01-07 DIAGNOSIS — I25.83 CORONARY ARTERY DISEASE DUE TO LIPID RICH PLAQUE: ICD-10-CM

## 2021-01-07 DIAGNOSIS — I24.9 ACS (ACUTE CORONARY SYNDROME) (HCC): Primary | ICD-10-CM

## 2021-01-07 DIAGNOSIS — G20 PARKINSON'S DISEASE (HCC): ICD-10-CM

## 2021-01-07 DIAGNOSIS — I25.10 CORONARY ARTERY DISEASE DUE TO LIPID RICH PLAQUE: ICD-10-CM

## 2021-01-07 PROBLEM — R07.9 CHEST PAIN: Status: ACTIVE | Noted: 2021-01-07

## 2021-01-07 LAB
A/G RATIO: 1.3 (ref 1.1–2.2)
ALBUMIN SERPL-MCNC: 3.8 G/DL (ref 3.4–5)
ALP BLD-CCNC: 113 U/L (ref 40–129)
ALT SERPL-CCNC: <5 U/L (ref 10–40)
ANION GAP SERPL CALCULATED.3IONS-SCNC: 10 MMOL/L (ref 3–16)
APTT: 32.5 SEC (ref 24.2–36.2)
APTT: 42.9 SEC (ref 24.2–36.2)
AST SERPL-CCNC: 12 U/L (ref 15–37)
BASOPHILS ABSOLUTE: 0 K/UL (ref 0–0.2)
BASOPHILS RELATIVE PERCENT: 0.2 %
BILIRUB SERPL-MCNC: 0.6 MG/DL (ref 0–1)
BUN BLDV-MCNC: 23 MG/DL (ref 7–20)
CALCIUM SERPL-MCNC: 9.4 MG/DL (ref 8.3–10.6)
CHLORIDE BLD-SCNC: 99 MMOL/L (ref 99–110)
CO2: 25 MMOL/L (ref 21–32)
CREAT SERPL-MCNC: 0.9 MG/DL (ref 0.8–1.3)
EKG ATRIAL RATE: 80 BPM
EKG DIAGNOSIS: NORMAL
EKG P AXIS: 84 DEGREES
EKG P-R INTERVAL: 134 MS
EKG Q-T INTERVAL: 382 MS
EKG QRS DURATION: 86 MS
EKG QTC CALCULATION (BAZETT): 440 MS
EKG R AXIS: 85 DEGREES
EKG T AXIS: -22 DEGREES
EKG VENTRICULAR RATE: 80 BPM
EOSINOPHILS ABSOLUTE: 0.1 K/UL (ref 0–0.6)
EOSINOPHILS RELATIVE PERCENT: 1.1 %
GFR AFRICAN AMERICAN: >60
GFR NON-AFRICAN AMERICAN: >60
GLOBULIN: 2.9 G/DL
GLUCOSE BLD-MCNC: 92 MG/DL (ref 70–99)
HCT VFR BLD CALC: 35.3 % (ref 40.5–52.5)
HEMOGLOBIN: 11.1 G/DL (ref 13.5–17.5)
INR BLD: 1.1 (ref 0.86–1.14)
LYMPHOCYTES ABSOLUTE: 1.5 K/UL (ref 1–5.1)
LYMPHOCYTES RELATIVE PERCENT: 13.2 %
MCH RBC QN AUTO: 28.5 PG (ref 26–34)
MCHC RBC AUTO-ENTMCNC: 31.3 G/DL (ref 31–36)
MCV RBC AUTO: 90.9 FL (ref 80–100)
MONOCYTES ABSOLUTE: 0.4 K/UL (ref 0–1.3)
MONOCYTES RELATIVE PERCENT: 4 %
NEUTROPHILS ABSOLUTE: 9 K/UL (ref 1.7–7.7)
NEUTROPHILS RELATIVE PERCENT: 81.5 %
PDW BLD-RTO: 15 % (ref 12.4–15.4)
PLATELET # BLD: 185 K/UL (ref 135–450)
PMV BLD AUTO: 7.9 FL (ref 5–10.5)
POTASSIUM REFLEX MAGNESIUM: 3.6 MMOL/L (ref 3.5–5.1)
PRO-BNP: 375 PG/ML (ref 0–449)
PROTHROMBIN TIME: 12.8 SEC (ref 10–13.2)
RBC # BLD: 3.88 M/UL (ref 4.2–5.9)
SODIUM BLD-SCNC: 134 MMOL/L (ref 136–145)
TOTAL PROTEIN: 6.7 G/DL (ref 6.4–8.2)
TROPONIN: 0.01 NG/ML
TROPONIN: 0.02 NG/ML
WBC # BLD: 11.1 K/UL (ref 4–11)

## 2021-01-07 PROCEDURE — 71046 X-RAY EXAM CHEST 2 VIEWS: CPT

## 2021-01-07 PROCEDURE — 80053 COMPREHEN METABOLIC PANEL: CPT

## 2021-01-07 PROCEDURE — 2000000000 HC ICU R&B

## 2021-01-07 PROCEDURE — 93010 ELECTROCARDIOGRAM REPORT: CPT | Performed by: INTERNAL MEDICINE

## 2021-01-07 PROCEDURE — 85025 COMPLETE CBC W/AUTO DIFF WBC: CPT

## 2021-01-07 PROCEDURE — 6360000002 HC RX W HCPCS: Performed by: EMERGENCY MEDICINE

## 2021-01-07 PROCEDURE — 99284 EMERGENCY DEPT VISIT MOD MDM: CPT

## 2021-01-07 PROCEDURE — 84484 ASSAY OF TROPONIN QUANT: CPT

## 2021-01-07 PROCEDURE — 93005 ELECTROCARDIOGRAM TRACING: CPT | Performed by: EMERGENCY MEDICINE

## 2021-01-07 PROCEDURE — 2580000003 HC RX 258: Performed by: INTERNAL MEDICINE

## 2021-01-07 PROCEDURE — 6370000000 HC RX 637 (ALT 250 FOR IP): Performed by: PHYSICIAN ASSISTANT

## 2021-01-07 PROCEDURE — 83880 ASSAY OF NATRIURETIC PEPTIDE: CPT

## 2021-01-07 PROCEDURE — 99223 1ST HOSP IP/OBS HIGH 75: CPT | Performed by: INTERNAL MEDICINE

## 2021-01-07 PROCEDURE — 6360000002 HC RX W HCPCS: Performed by: INTERNAL MEDICINE

## 2021-01-07 PROCEDURE — 6370000000 HC RX 637 (ALT 250 FOR IP): Performed by: INTERNAL MEDICINE

## 2021-01-07 PROCEDURE — 85610 PROTHROMBIN TIME: CPT

## 2021-01-07 PROCEDURE — 85730 THROMBOPLASTIN TIME PARTIAL: CPT

## 2021-01-07 PROCEDURE — 36415 COLL VENOUS BLD VENIPUNCTURE: CPT

## 2021-01-07 RX ORDER — NITROGLYCERIN 0.4 MG/1
0.4 TABLET SUBLINGUAL EVERY 5 MIN PRN
Status: CANCELLED | OUTPATIENT
Start: 2021-01-07

## 2021-01-07 RX ORDER — CLOPIDOGREL BISULFATE 75 MG/1
75 TABLET ORAL DAILY
Status: DISCONTINUED | OUTPATIENT
Start: 2021-01-07 | End: 2021-01-09 | Stop reason: HOSPADM

## 2021-01-07 RX ORDER — POLYETHYLENE GLYCOL 3350 17 G/17G
17 POWDER, FOR SOLUTION ORAL DAILY PRN
Status: DISCONTINUED | OUTPATIENT
Start: 2021-01-07 | End: 2021-01-09 | Stop reason: HOSPADM

## 2021-01-07 RX ORDER — ONDANSETRON 2 MG/ML
4 INJECTION INTRAMUSCULAR; INTRAVENOUS EVERY 6 HOURS PRN
Status: DISCONTINUED | OUTPATIENT
Start: 2021-01-07 | End: 2021-01-09 | Stop reason: HOSPADM

## 2021-01-07 RX ORDER — HEPARIN SODIUM 10000 [USP'U]/100ML
12 INJECTION, SOLUTION INTRAVENOUS CONTINUOUS
Status: DISCONTINUED | OUTPATIENT
Start: 2021-01-07 | End: 2021-01-08

## 2021-01-07 RX ORDER — SODIUM CHLORIDE 0.9 % (FLUSH) 0.9 %
10 SYRINGE (ML) INJECTION EVERY 12 HOURS SCHEDULED
Status: DISCONTINUED | OUTPATIENT
Start: 2021-01-07 | End: 2021-01-09 | Stop reason: HOSPADM

## 2021-01-07 RX ORDER — ASPIRIN 81 MG/1
81 TABLET, CHEWABLE ORAL DAILY
Status: DISCONTINUED | OUTPATIENT
Start: 2021-01-08 | End: 2021-01-09 | Stop reason: HOSPADM

## 2021-01-07 RX ORDER — SODIUM CHLORIDE 0.9 % (FLUSH) 0.9 %
10 SYRINGE (ML) INJECTION PRN
Status: DISCONTINUED | OUTPATIENT
Start: 2021-01-07 | End: 2021-01-09 | Stop reason: HOSPADM

## 2021-01-07 RX ORDER — MORPHINE SULFATE 2 MG/ML
1 INJECTION, SOLUTION INTRAMUSCULAR; INTRAVENOUS EVERY 4 HOURS PRN
Status: DISCONTINUED | OUTPATIENT
Start: 2021-01-07 | End: 2021-01-09 | Stop reason: HOSPADM

## 2021-01-07 RX ORDER — CARBIDOPA AND LEVODOPA 25; 100 MG/1; MG/1
1 TABLET, ORALLY DISINTEGRATING ORAL 3 TIMES DAILY
Status: DISCONTINUED | OUTPATIENT
Start: 2021-01-07 | End: 2021-01-09 | Stop reason: HOSPADM

## 2021-01-07 RX ORDER — ACETAMINOPHEN 325 MG/1
650 TABLET ORAL EVERY 6 HOURS PRN
Status: DISCONTINUED | OUTPATIENT
Start: 2021-01-07 | End: 2021-01-09 | Stop reason: HOSPADM

## 2021-01-07 RX ORDER — MIDODRINE HYDROCHLORIDE 5 MG/1
2.5 TABLET ORAL
Status: DISCONTINUED | OUTPATIENT
Start: 2021-01-08 | End: 2021-01-09 | Stop reason: HOSPADM

## 2021-01-07 RX ORDER — FLUDROCORTISONE ACETATE 0.1 MG/1
0.1 TABLET ORAL DAILY
Status: DISCONTINUED | OUTPATIENT
Start: 2021-01-08 | End: 2021-01-09 | Stop reason: HOSPADM

## 2021-01-07 RX ORDER — MORPHINE SULFATE 2 MG/ML
2 INJECTION, SOLUTION INTRAMUSCULAR; INTRAVENOUS EVERY 4 HOURS PRN
Status: DISCONTINUED | OUTPATIENT
Start: 2021-01-07 | End: 2021-01-08

## 2021-01-07 RX ORDER — NITROGLYCERIN 20 MG/100ML
5 INJECTION INTRAVENOUS CONTINUOUS
Status: DISCONTINUED | OUTPATIENT
Start: 2021-01-07 | End: 2021-01-08

## 2021-01-07 RX ORDER — ASPIRIN 81 MG/1
324 TABLET, CHEWABLE ORAL ONCE
Status: COMPLETED | OUTPATIENT
Start: 2021-01-07 | End: 2021-01-07

## 2021-01-07 RX ORDER — HEPARIN SODIUM 1000 [USP'U]/ML
60 INJECTION, SOLUTION INTRAVENOUS; SUBCUTANEOUS PRN
Status: DISCONTINUED | OUTPATIENT
Start: 2021-01-07 | End: 2021-01-08

## 2021-01-07 RX ORDER — ASPIRIN 81 MG/1
81 TABLET ORAL DAILY
Status: DISCONTINUED | OUTPATIENT
Start: 2021-01-08 | End: 2021-01-07 | Stop reason: SDUPTHER

## 2021-01-07 RX ORDER — ACETAMINOPHEN 650 MG/1
650 SUPPOSITORY RECTAL EVERY 6 HOURS PRN
Status: DISCONTINUED | OUTPATIENT
Start: 2021-01-07 | End: 2021-01-09 | Stop reason: HOSPADM

## 2021-01-07 RX ORDER — MIDODRINE HYDROCHLORIDE 2.5 MG/1
2.5 TABLET ORAL 3 TIMES DAILY
Status: ON HOLD | COMMUNITY
End: 2021-01-13 | Stop reason: HOSPADM

## 2021-01-07 RX ORDER — ATORVASTATIN CALCIUM 80 MG/1
80 TABLET, FILM COATED ORAL NIGHTLY
Status: DISCONTINUED | OUTPATIENT
Start: 2021-01-07 | End: 2021-01-09 | Stop reason: HOSPADM

## 2021-01-07 RX ORDER — PROMETHAZINE HYDROCHLORIDE 25 MG/1
12.5 TABLET ORAL EVERY 6 HOURS PRN
Status: DISCONTINUED | OUTPATIENT
Start: 2021-01-07 | End: 2021-01-09 | Stop reason: HOSPADM

## 2021-01-07 RX ORDER — HEPARIN SODIUM 1000 [USP'U]/ML
60 INJECTION, SOLUTION INTRAVENOUS; SUBCUTANEOUS ONCE
Status: COMPLETED | OUTPATIENT
Start: 2021-01-07 | End: 2021-01-07

## 2021-01-07 RX ORDER — HEPARIN SODIUM 1000 [USP'U]/ML
30 INJECTION, SOLUTION INTRAVENOUS; SUBCUTANEOUS PRN
Status: DISCONTINUED | OUTPATIENT
Start: 2021-01-07 | End: 2021-01-08

## 2021-01-07 RX ORDER — CARVEDILOL 3.12 MG/1
6.25 TABLET ORAL 2 TIMES DAILY WITH MEALS
Status: DISCONTINUED | OUTPATIENT
Start: 2021-01-07 | End: 2021-01-09 | Stop reason: HOSPADM

## 2021-01-07 RX ADMIN — HEPARIN SODIUM 2990 UNITS: 1000 INJECTION INTRAVENOUS; SUBCUTANEOUS at 15:21

## 2021-01-07 RX ADMIN — MORPHINE SULFATE 2 MG: 2 INJECTION, SOLUTION INTRAMUSCULAR; INTRAVENOUS at 22:35

## 2021-01-07 RX ADMIN — CARBIDOPA AND LEVODOPA 1 TABLET: 25; 100 TABLET, ORALLY DISINTEGRATING ORAL at 22:41

## 2021-01-07 RX ADMIN — ASPIRIN 243 MG: 81 TABLET, CHEWABLE ORAL at 14:50

## 2021-01-07 RX ADMIN — HEPARIN SODIUM AND DEXTROSE 12 UNITS/KG/HR: 10000; 5 INJECTION INTRAVENOUS at 15:24

## 2021-01-07 RX ADMIN — Medication 10 ML: at 22:42

## 2021-01-07 RX ADMIN — HEPARIN SODIUM 1500 UNITS: 1000 INJECTION INTRAVENOUS; SUBCUTANEOUS at 22:20

## 2021-01-07 RX ADMIN — CARVEDILOL 6.25 MG: 3.12 TABLET, FILM COATED ORAL at 22:41

## 2021-01-07 RX ADMIN — NITROGLYCERIN 1 INCH: 20 OINTMENT TOPICAL at 14:52

## 2021-01-07 RX ADMIN — ATORVASTATIN CALCIUM 80 MG: 80 TABLET, FILM COATED ORAL at 22:41

## 2021-01-07 ASSESSMENT — PAIN DESCRIPTION - LOCATION
LOCATION: HEAD;NECK
LOCATION: HEAD;NECK

## 2021-01-07 ASSESSMENT — PAIN SCALES - GENERAL
PAINLEVEL_OUTOF10: 7
PAINLEVEL_OUTOF10: 5
PAINLEVEL_OUTOF10: 7

## 2021-01-07 ASSESSMENT — ENCOUNTER SYMPTOMS
COUGH: 1
VOMITING: 0
SHORTNESS OF BREATH: 1
DIARRHEA: 0
NAUSEA: 0
CHEST TIGHTNESS: 0
ABDOMINAL PAIN: 0

## 2021-01-07 ASSESSMENT — PAIN DESCRIPTION - PAIN TYPE: TYPE: ACUTE PAIN

## 2021-01-07 ASSESSMENT — PAIN DESCRIPTION - ORIENTATION
ORIENTATION: RIGHT
ORIENTATION: RIGHT

## 2021-01-07 NOTE — ED PROVIDER NOTES
[address] [facility]        Pt Name: Boris Dangelo  MRN: [id number]  [place] 1940  Date of evaluation: 1/7/2021  Provider: Madalyn Walsh PA-C  PCP: Elsie Canada MD     I have seen and evaluated this patient with my supervising physician Deepak Victor MD.    [address] [place] [address]       Chief Complaint   Patient presents with    Chest Pain     pt states he has had constant CP/SOB x2 days       HISTORY OF PRESENT ILLNESS   (Location, Timing/Onset, Context/Setting, Quality, Duration, Modifying Factors, Severity, Associated Signs and Symptoms)  Note limiting factors. Mervin Vela is a 79 y.o. male the emergency department with difficulties as a pertains to chest pain. Patient had an extensive cardiac history. He goes on to report that he has had intermittent chest pain multiple times in the past.  He does not report that last night at approximately 1030 got really bad. It was in the central portion of his chest.  He states that it did not radiate. He states that he did take a nitroglycerin and it seemed to help and relieve some of the pain and discomfort. Unfortunately as he awoke this morning the pain is returned and it seems to be worse. He reports that he has not had any nitroglycerin today. He has a history of CABG dating back to 1998 has 6 cardiac stents and is cared for by Dr. Everett Mar. He goes on to report he is feeling a little bit short of breath and does have a history of COPD. He states he has had his regular and usual baseline cough. Is not had any significant weight gain that he is aware of. He has not had difficulties as a pertains to fevers and or chills. He is not having any kind of pleuritic type chest pain. Pain and discomfort in his chest demonstrate 5 out of 10. Because nothing is made the symptoms better and or worse he presents to the ED for evaluation and treatment. Patient tells me he also has a history of Parkinson's but has had no recent falls or concerns or injuries. He denies that he is experiencing any GI or  complaints at the present time. Nursing Notes were all reviewed and agreed with or any disagreements were addressed in the HPI. REVIEW OF SYSTEMS    (2-9 systems for level 4, 10 or more for level 5)     Review of Systems   Constitutional: Positive for fatigue. Negative for activity change, chills and fever. Respiratory: Positive for cough and shortness of breath. Negative for chest tightness. Cardiovascular: Positive for leg swelling. Negative for chest pain. Gastrointestinal: Negative for abdominal pain, diarrhea, nausea and vomiting. Genitourinary: Negative for dysuria and flank pain. Musculoskeletal: Positive for gait problem. Skin: Negative for rash and wound. Neurological: Positive for tremors. Negative for numbness and headaches. Positives and Pertinent negatives as per HPI. Except as noted above in the ROS, all other systems were reviewed and negative. PAST MEDICAL HISTORY     Past Medical History:   Diagnosis Date    CAD (coronary artery disease)     CABG and cardiac stents    COPD [facility]chronic obstructive [facility] disease) ([facility] [address]     Coronary artery bypass 1998    Coronary stent 2003    2    Hyperlipidemia     Hypertension     Osteoarthritis     Parkinson disease ([facility] [address] 5/28/2019    Prostate cancer [facility]) 2004    Rotator cuff syndrome          SURGICAL HISTORY     Past Surgical History:   Procedure Laterality Date    CARDIAC SURGERY  1998    CATARACT REMOVAL WITH IMPLANT  2015    COLONOSCOPY  2005    COLONOSCOPY N/A 8/30/2019    COLONOSCOPY WITH BIOPSY performed by Antony Ferrer MD at [address] [place] [address]    x 2    CORONARY ANGIOPLASTY  2013    x 1 stent    CORONARY ARTERY BYPASS GRAFT      1998, barrett    PROSTATECTOMY  2004    Cured, dr Opal Ramirez SIGMOIDOSCOPY N/A 1/25/2019    SIGMOIDOSCOPY SCREENING performed by Antony Ferrer MD at [phone]       Previous Medications    ASPIRIN 81 MG EC TABLET    Take 81 mg by mouth daily.       CARBIDOPA-LEVODOPA (PARCOPA)  MG TBDP    Take 1 tablet by mouth 3 times daily    DIAPERS & SUPPLIES MISC    1 each by Does not apply route 3 times daily Adult diapers or depends    FLUDROCORTISONE (FLORINEF) 0.1 MG TABLET    Take 2 tablets by mouth daily    MIDODRINE (PROAMATINE) 5 MG TABLET    Take 1 tablet by mouth 3 times daily    TRI CANE MISC    by Does not apply route 3 prong cane         ALLERGIES Patient has no known allergies. FAMILYHISTORY       Family History   Problem Relation Age of Onset    Cancer Mother         Bowel?  Colon Cancer Mother     Cancer Sister         Bowel?  Colon Cancer Sister     Heart Disease Neg Hx           SOCIAL HISTORY       Social History     Tobacco Use    Smoking status: Former Smoker     Packs/day: 2.00     Years: 30.00     Pack years: 60.00     Quit date: 5/15/1983     Years since quittin.6    Smokeless tobacco: Never Used   Substance Use Topics    Alcohol use: No    Drug use: No       SCREENINGS             PHYSICAL EXAM    (up to 7 for level 4, 8 or more for level 5)     ED Triage Vitals [21 1316]   BP Temp Temp Source Pulse Resp SpO2 Height Weight   127/82 98.7 °F (37.1 °C) Infrared 86 16 98 % 5' 10\" (1.778 m) 110 lb (49.9 kg)       Physical Exam  Vitals signs and nursing note reviewed. Constitutional:       General: He is awake. He is not in acute distress. Appearance: Normal appearance. He is well-developed. He is not ill-appearing or diaphoretic. HENT:      Head: Normocephalic and atraumatic. No raccoon eyes, Coyle's sign, contusion or laceration. Right Ear: External ear normal.      Left Ear: External ear normal.   Eyes:      General: No scleral icterus. Right eye: No discharge. Left eye: No discharge. Conjunctiva/sclera: Conjunctivae normal.   Neck:      Musculoskeletal: Normal range of motion. Vascular: No JVD. Cardiovascular:      Rate and Rhythm: Normal rate and regular rhythm. Heart sounds: No murmur. No friction rub. No gallop. Comments: No significant peripheral edema bilateral lower extremities. Pulmonary:      Effort: Pulmonary effort is normal. No accessory muscle usage or respiratory distress. Breath sounds: Normal breath sounds. No wheezing, rhonchi or rales. Abdominal:      General: There is no distension. Palpations: Abdomen is soft. Abdomen is not rigid. There is no mass. Tenderness: There is no abdominal tenderness. There is no guarding or rebound. Musculoskeletal:      Right lower leg: No edema. Left lower leg: No edema. Skin:     General: Skin is warm and dry. Neurological:      Mental Status: He is alert and oriented to person, place, and time. GCS: GCS eye subscore is 4. GCS verbal subscore is 5. GCS motor subscore is 6. Cranial Nerves: No cranial nerve deficit. Sensory: No sensory deficit. Motor: Tremor present. Coordination: Coordination normal.   Psychiatric:         Behavior: Behavior normal. Behavior is cooperative.          DIAGNOSTIC RESULTS   LABS:    Labs Reviewed   TROPONIN - Abnormal; Notable for the following components:       Result Value    Troponin 0.02 (*)     All other components within normal limits    Narrative:     Performed at:  [facility] [address], [place], [address] [employer]   Phone [phone]   CBC WITH AUTO DIFFERENTIAL - Abnormal; Notable for the following components:    WBC 11.1 (*)     RBC 3.88 (*)     Hemoglobin 11.1 (*)     Hematocrit 35.3 (*)     Neutrophils Absolute 9.0 (*)     All other components within normal limits    Narrative:     Performed at:  [facility] [address], [place], [address] [employer]   Phone [phone]   COMPREHENSIVE METABOLIC PANEL W/ REFLEX TO MG FOR LOW K - Abnormal; Notable for the following components:    Sodium 134 (*)     BUN 23 (*)     ALT <5 (*)     AST 12 (*)     All other components within normal limits    Narrative:     Performed at:  [facility] [address], [employer] [facility] [employer]   Phone [phone]   PROTIME-INR    Narrative:     Performed at:  [facility] [address] [facility]  [email or web address]   Phone [phone]    Narrative: Performed at:  [facility] [address] [facility], [address]   Phone [phone]       All other labs were within normal range or not returned as of this dictation. EKG: All EKG's are interpreted by the Emergency Department Physician in the absence of a cardiologist.  Please see their note for interpretation of EKG. RADIOLOGY:   Non-plain film images such as CT, Ultrasound and MRI are read by the radiologist. Plain radiographic images are visualized and preliminarily interpreted by the ED Provider with the below findings:        Interpretation per the Radiologist below, if available at the time of this note:    XR CHEST (2 VW)    (Results Pending)         PROCEDURES   Unless otherwise noted below, none     Procedures    CRITICAL CARE TIME   Because of the high probability of sudden clinical deterioration of the patients condition and to prevent further deterioration, my critical care time involved my full attention to the patients condition, and included chart data review, documentation, medication ordering, viewing the patients old records, reevaluation of the patient's cardiac, pulmonary, and neurological status. Reassessing vital signs. Consutlations with off service physician. Ordering, interpreting reviewing diagnostic testing. Therefore my critical care time was 32 minutes of direct attention to the patients condition and did not include time spent on procedures.       CONSULTS:  None      EMERGENCY DEPARTMENT COURSE and DIFFERENTIAL DIAGNOSIS/MDM:   Vitals:    Vitals:    01/07/21 1316   BP: 127/82   Pulse: 86   Resp: 16   Temp: 98.7 °F (37.1 °C)   TempSrc: Infrared   SpO2: 98%   Weight: 110 lb (49.9 kg)   Height: 5' 10\" (1.778 m)       Patient was given the following medications:  Medications   aspirin chewable tablet 324 mg (has no administration in time range)   nitroglycerin (NITRO-BID) 2 % ointment 1 inch (has no administration in time range) The patient's detailed history of present illness is documented as above. Upon arrival to the emergency department the patient's vital signs are as documented. The patient is noted to be hemodynamically stable and afebrile. Physical examination findings are as above. IV access was obtained. Laboratory testing and work-up was initiated. Initial EKG demonstrates a sinus rhythm with a rate of 80. There is a little bit of baseline wander from the patient's parkinsonism. No evidence of acute ST elevation. He continues to have T wave inversions noted in lead III but these are definitely more pronounced and worse also now in lead II and aVF. Please see attending physician details for further EKG interpretation in comparison from May 20, 2015. CBC demonstrates trace leukocytosis at 11.1. Mild anemia 11.1 and 35.3 respectively with no evidence of thrombocytopenia thrombocytosis. BUN is 23 creatinine is 0.9 nonfasting glucose 92 electrolytes unremarkable. Coags unremarkable troponin is 0.02. Patient had already received aspirin and nitroglycerin paste upon his arrival.  In light of his extensive cardiac history coronary artery disease he will require ongoing care management on an inpatient basis. Patient will be admitted to medical surgical services for ongoing care management the diagnoses below. Dr. June Monson did speak directly with Dr. Nick Austin from cardiology who did recommend that the patient be heparinized. I also spoke with Dr. David Ramirez. He is aware of the above-mentioned. Patient will be admitted to the medical surgical services for ongoing care management the diagnoses below. FINAL IMPRESSION      1. ACS (acute coronary syndrome) ([facility] [address]    2. Coronary artery disease due to lipid rich plaque    3. History of coronary artery bypass graft    4.  Parkinson's disease ([facility] [address]          DISPOSITION/PLAN   DISPOSITION: Admit medical stable condition (Please note that portions of this note were completed with a voice recognition program.  Efforts were made to edit the dictations but occasionally words are mis-transcribed.)    Rafi Tellez PA-C (electronically signed)           Dina Payan PA-C  01/07/21 7722

## 2021-01-07 NOTE — CONSULTS
Cardiovascular Consultation     Attending Physician: Crissy Rhodes MD    PATIENT: Luly Disla  : 1940  MRN: [id number]    Reason for Consultation:   Chief Complaint   Patient presents with    Chest Pain     pt states he has had constant CP/SOB x2 days       History of present illness:   Mr. Luly Disla is a 79 y.o. male patient of Dr. Dea Chino, last seen in his Cardiology office in 2020, who presented to ER with worsening \"angina\". He admits to taking a \"second aspirin most days of the last two months\" and getting relief for central chest heaviness. He has been off of antihypertensives and Fludrocortisone was increased at last visit for hypotension. He has a history of Parkinson disease and follows closely with  Neurology. States that heaviness never lasted until last night. When finding improvement from daughter's SL Nitro, he decided to seek medical attention. \"Didn't get to left arm and went from 6/10 to 3/10\". Denies palpitations, syncope, N/V, diaphoresis, shortness of breath, PND, orthopnea, or LE edema.      Medical History:      Diagnosis Date    CAD (coronary artery disease)     CABG and cardiac stents    [facility]) [facility]     Coronary artery bypass     Coronary stent     2    Hyperlipidemia     Hypertension     Osteoarthritis     [facility]    Prostate cancer [facility]) 2004    Rotator cuff syndrome        Surgical History:      Procedure Laterality Date    CARDIAC SURGERY      CATARACT REMOVAL WITH IMPLANT      COLONOSCOPY      COLONOSCOPY N/A 2019    COLONOSCOPY WITH BIOPSY performed by Chloe Cloud MD at [address] [place] [address]    x 2    CORONARY ANGIOPLASTY  2013    x 1 stent    CORONARY ARTERY BYPASS GRAFT      , barrett    PROSTATECTOMY  2004    Cured, dr Ace Washburn N/A 2019 SIGMOIDOSCOPY SCREENING performed by Renata Moeller MD at [address] History:  Social History     Socioeconomic History    Marital status:      Spouse name: Not on file    Number of children: 1    Years of education: Not on file    Highest education level: Not on file   Occupational History    Not on file   Social Needs    Financial resource strain: Not hard at all    Food insecurity     Worry: Never true     Inability: Never true   [employer] needs     Medical: No     Non-medical: No   Tobacco Use    Smoking status: Former Smoker     Packs/day: 2.00     Years: 30.00     Pack years: 60.00     Quit date: 5/15/1983     Years since quittin.6    Smokeless tobacco: Never Used   Substance and Sexual Activity    Alcohol use: No    Drug use: No    Sexual activity: Yes     Partners: Female   Lifestyle    Physical activity     Days per week: Not on file     Minutes per session: Not on file    Stress: Not on file   Relationships    Social connections     Talks on phone: Not on file     Gets together: Not on file     Attends Orthodox service: Not on file     Active member of club or organization: Not on file     Attends meetings of clubs or organizations: Not on file     Relationship status: Not on file    Intimate partner violence     Fear of current or ex partner: Not on file     Emotionally abused: Not on file     Physically abused: Not on file     Forced sexual activity: Not on file   Other Topics Concern    Not on file   Social History Narrative    Not on file        Family History:  No evidence for sudden cardiac death or premature CAD. Problem Relation Age of Onset    Cancer Mother         Bowel?  Colon Cancer Mother     Cancer Sister         Bowel?     Colon Cancer Sister     Heart Disease Neg Hx        Medications:      heparin (porcine) injection 2,990 Units, PRN      heparin (porcine) injection 1,500 Units, PRN  2021     Lab Results   Component Value Date    CHOL 139 10/06/2018    TRIG 95 10/06/2018    HDL 46 10/06/2018    HDL 37 2012    LDLDIRECT 156 2013       Imaging:  I have reviewed the below testing personally:    EK/7/21  Normal sinus rhythm  T wave abnormality, consider inferior ischemia     ECHO:      -Technically limited study due to poor accoustical windows. Off axis 2D   measurements.   -Normal left ventricle size, wall thickness and systolic function with an   estimated ejection fraction of 55%. No regional wall motion abnormalities   are seen. There is reversal of E/A inflow velocities across the mitral valve   suggesting impaired left ventricular relaxation.   -Mitral annular calcification is present.   -Trivial mitral regurgitation is present.   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   -The aortic valve appears sclerotic but opens well. -There is trivial tricuspid regurgitation with RVSP estimated at 17 mmHg. STRESS TEST:     Conclusions        Summary    Normal myocardial perfusion study.    Normal LV function.       21 CXR    FINDINGS:   The lungs are without acute focal process.  There is no effusion or   pneumothorax. The cardiomediastinal silhouette is stable. The osseous   structures are stable. [facility] 13> patent LAD stent, patent intermediate, patent distal RCA stent, normal EF 80%, mid RCA treated w/ 3. 5 x 15 w/ excellent results. Unable to place closure device so manual pressure used. ProBNP 375  Troponin 0.02      Impression/Recommendations    Mr. Carly Bishop is a 79 y.o. male patient of Dr. Kim Iyer:     UA/NSTEMI  MVCAD with Hx.  CABG: PCI-RCA ; remote PCI LAD & LCX, atretic LIMA  Hyperlipidemia  Parkinson Disease  COPD  Former tobacco    Heparin gtt  Nitrates, low dose beta blocker, Morphine as needed  ASA/Plavix  Update echo ECG changes more suggestive of inferior ischemia than prior; reviewed 2013 films. LHC +/- PCI recommended. Risks, benefits, goals, and alternatives of left heart catheterization with the potential for percutaneous coronary intervention discussed with patient; including stroke, heart attack, kidney damage, death, paralysis, disability, damage to nerves/arteries/veins. All questions answered and informed consent obtained. Further recommendations pending coronary angiography and clinical course. Thank you for allowing me to participate in the care of your patient. Please do not hesitate to call. Jaquelin Boogie DO, [facility]  Interventional Cardiology     o: [phone]  [facility] [address], [address]      NOTE:  This report was transcribed using voice recognition software. Every effort was made to ensure accuracy; however, inadvertent computerized transcription errors may be present.

## 2021-01-07 NOTE — CARE COORDINATION
Discharge Planning Assessment    SW discharge planner met with patient to discuss reason for admission, current living situation, and potential needs at the time of discharge. Pt in ED d/t acute coronary syndrome    Demographics/Insurance verified:  Yes    Current type of dwelling:  House - ok w/stairs    Living arrangements:  w/spouse    Level of function/Support:  Pt reported he uses a cane at all time. States spouse takes care of any of his needs. Has a supportive dtr in the area as well. PCP:  Dr. Kiara Medina    Last Visit to PCP:  Dec 2020    DME:  [place], [employer]. Pt reported he was just approved by his doctor a walker and motor scooter which are being delivered to his house. Unsure when he will receive them. Active with any community resources/agencies/skilled home care:  Yes, active with [facility] for home delivered meals and [employer]. Medication compliance issues:  No    Financial issues that could impact healthcare:  No    Tentative discharge plan:  Home most likely. Pt active with [facility].  No other needs identified at this time. Discussed with patient and/or family that on the day of discharge home tentative time of discharge will be between 10 AM and noon. Transportation at the time of discharge:  Spouse or dtr can transport home.     Electronically signed by MARYAN Soni LSW on 1/7/2021 at 5:40 PM

## 2021-01-07 NOTE — PROGRESS NOTES
Pt seen in  ED, admission completed. Pt is alert and oriented x 4. Pt lives at home with his wife and is being admitted for eval/ work up for complaints of chest pain. Plan of care updated, all questions answered.

## 2021-01-07 NOTE — ED PROVIDER NOTES
I independently performed a history and physical on Ching Lara. All diagnostic, treatment, and disposition decisions were made by myself in conjunction with the advanced practice provider. I have participated in the medical decision making and directed the treatment plan and disposition of the patient. For further details of Afsaneh Mccullough emergency department encounter, please see the advanced practice provider's documentation. CHIEF COMPLAINT  Chief Complaint   Patient presents with    Chest Pain     pt states he has had constant CP/SOB x2 days     Briefly, Ching Lara is a 79 y.o. male  who presents to the ED complaining of chest pain and SOB, with generalized weakness. Ongoing roughly 2 days but last night was particularly uncomfortable. No fevers or coughing. H/o CABG in the past.  Sees Dr. Tonya Moon from cardiology. FOCUSED PHYSICAL EXAMINATION  /82   Pulse 86   Temp 98.7 °F (37.1 °C) (Infrared)   Resp 16   Ht 5' 10\" (1.778 m)   Wt 110 lb (49.9 kg)   SpO2 98%   BMI 15.78 kg/m²    Focused physical examination notable for no acute distress, well-appearing, well-nourished, normal speech and mentation without obvious facial droop, no obvious rash. No obvious cranial nerve deficits on my initial exam. RRR CTAB. No leg swelling, abd soft NTND.     The 12 lead EKG was interpreted by me as follows:  Rate: normal with a rate of 80  Rhythm: sinus  Axis: normal  Intervals: normal MT, narrow QRS, normal QTc  ST segments: no ST elevations or depressions  T waves: inverted inferiorly  Non-specific T wave changes: present  Prior EKG comparison: EKG dated 5/20/15 is significantly different due to inferior changes more notable    MDM:  Diagnostic considerations included acute coronary syndrome, pulmonary embolism, COPD/asthma, pneumonia, musculoskeletal, reflux/PUD/gastritis, pneumothorax, CHF, thoracic aortic dissection, anxiety Patient noted to be heavily sleeping with oxygen saturation dropping to 84% on room air - placed on 2L nasal cannula.   ED course was notable for concern for ACS with +trop and normal kidney function. Inferior changes subtle but more pronounced compared to old EKG in 2015, suggesting NSTEMI as a likelihood. No tachycardia tachypnea or hypoxia to suggest PE. Admit for ACS workup and given aspirin/nitro here. Dr. Kami Newell from cardiology was consulted about the patient's ED history, physical, workup, and course so far. Recommendations from this consultant included heparinization which I ordered. During the patient's ED course, the patient was given:  Medications   aspirin chewable tablet 324 mg (has no administration in time range)   nitroglycerin (NITRO-BID) 2 % ointment 1 inch (has no administration in time range)        CLINICAL IMPRESSION  1. ACS (acute coronary syndrome) ([facility] [address]    2. Coronary artery disease due to lipid rich plaque    3. History of coronary artery bypass graft    4. Parkinson's disease ([facility] [address] [facility]        DISPOSITION  Boris Dangelo was admitted in fair condition. The plan is to admit to the hospital at this time under the hospitalist service. Hospitalist accepted the patient and will take over the patient's care. The total critical care time spent while evaluating and treating this patient was 32 minutes. This excludes time spent doing separately billable procedures. This includes time at the bedside, data interpretation, medication management, obtaining critical history from collateral sources if the patient is unable to provide it directly, and physician consultation. Specifics of interventions taken and potentially life-threatening diagnostic considerations are listed above in the medical decision making. This chart was created using Dragon dictation software. Efforts were made by me to ensure accuracy, however some errors may be present due to limitations of this technology.             Deepak Victor MD  01/07/21 10 [facility], [place]  01/07/21 [id number]

## 2021-01-07 NOTE — ED NOTES
Pharmacy Medication History Note      List of current medications patient is taking is complete. Source of information: Patient and  Daugther    Changes made to medication list:  Medications flagged for removal (include reason, ex. Noncompliance):  · none    Medications removed (include reason, ex. therapy complete or physician discontinued):  · none    Medications added/doses adjusted:  · Aspirin 81 mg EC take 1 tablet 2 times a day  · Midodrine 2.5 mg take 1 tablet 3 times a day    Other notes (ex. Recent course of antibiotics, Coumadin dosing):  · Denies use of other OTC or herbal medications. Last dose times updated. Haroldine Barthel Pharm D candidate 2021    No current facility-administered medications on file prior to encounter.       Current Outpatient Medications on File Prior to Encounter   Medication Sig Dispense Refill    midodrine (PROAMATINE) 2.5 MG tablet Take 2.5 mg by mouth 3 times daily      fludrocortisone (FLORINEF) 0.1 MG tablet Take 2 tablets by mouth daily 180 tablet 3    carbidopa-levodopa (PARCOPA)  MG TBDP Take 1 tablet by mouth 3 times daily 90 tablet 3    aspirin 81 MG EC tablet Take 162 mg by mouth daily       Diapers & Supplies MISC 1 each by Does not apply route 3 times daily Adult diapers or depends 100 each 5    Tri Cane MISC by Does not apply route 3 prong cane 1 each 0    [DISCONTINUED] midodrine (PROAMATINE) 5 MG tablet Take 1 tablet by mouth 3 times daily (Patient taking differently: Take 2.5 mg by mouth 3 times daily ) 270 tablet 0     Wali Vargas, PharmD  ED Pharmacist [id number]  1/7/2021

## 2021-01-07 NOTE — TELEPHONE ENCOUNTER
I spoke with wife. She stated that he is\"really bad off\". I tried to ask for further s/s. She stated that pt want to see [facility], but was advised that he is unavailable for the next 11 days. I advised that if he is really bad off then with active chest pain he should have an evaluation with blood work and testing that they can do more quickly in the hospital. She stated that they have no way to get here. I advised calling the [facility], she said that they only service to [facility]. I told her that was ok so long as he received attention. They will call squ.

## 2021-01-07 NOTE — TELEPHONE ENCOUNTER
Pt had chest pain last night and daughter gave him nitro. Pt still feeling very bad today. Requesting to see les and I told them he was not available. Please call wife to advise.

## 2021-01-07 NOTE — H&P
Hospital Medicine History and Physical    1/7/2021    Date of Admission: 1/7/2021    Date of Service: Pt seen/examined on 1/7/2021 and admitted to inpatient. Assessment/plan:  1. Chest pain, abnormal EKG. He has chronic nonzero troponin. Per cardiology, patient has been started on heparin infusion. Start nitroglycerine infusion (with hold parameters). Continue aspirin, beta-blocker, statin. Check lipid profile, echocardiogram.  Obtain serial troponin. N.p.o. after midnight. 2. History of essential hypertension. Continue Coreg, with hold parameters. Patient does have history of orthostatic hypotension; monitor blood pressure closely. 3. Other comorbidities: History of CAD status post CABG and PCI in the past, history of COPD, hyperlipidemia, Parkinson's disease, underweight with BMI of 15.78 kg/m². Activities: Up with assist  Prophylaxis: On heparin infusion  Code status: Full code    ==========================================================  Chief complaint:  Chief Complaint   Patient presents with    Chest Pain     pt states he has had constant CP/SOB x2 days       History of Presenting Illness: This is a pleasant 79 y.o. male with history of CAD status post CABG, PCI's, history of COPD, hyperlipidemia, Parkinson's disease, orthostatic hypotension (on midodrine, Florinef), who presents to the emergency room with complaints of intermittent substernal chest discomfort, ongoing for the past 2 days. There is exertional component to chest discomfort; worsens with activities, improves with rest.  At the time of my evaluation in the emergency room, patient does not have chest pain. He does have elevated troponin of 0.02; has had elevated troponin in the past as well. There is new inverted T wave in inferior leads on EKG.      Past Medical History:      Diagnosis Date    CAD (coronary artery disease)     CABG and cardiac stents    COPD [facility] disease) ([facility] [address]  Coronary artery bypass 1998    Coronary stent 2003    2    Hyperlipidemia     Hypertension     Osteoarthritis     Parkinson disease ([address] 5/28/2019    Prostate cancer [facility]) 2004    Rotator cuff syndrome        Past Surgical History:      Procedure Laterality Date    CARDIAC SURGERY  1998    CATARACT REMOVAL WITH IMPLANT  2015    COLONOSCOPY  2005    COLONOSCOPY N/A 8/30/2019    COLONOSCOPY WITH BIOPSY performed by Leandra Sosa MD at [address]    x 2    CORONARY ANGIOPLASTY  2013    x 1 stent    CORONARY ARTERY BYPASS GRAFT      1998, barrett    PROSTATECTOMY  2004    Cured, dr Dandre Lees N/A 1/25/2019    SIGMOIDOSCOPY SCREENING performed by Leandra Sosa MD at [address]       Medications (prior to admission):  Prior to Admission medications    Medication Sig Start Date End Date Taking? Authorizing Provider   fludrocortisone (FLORINEF) 0.1 MG tablet Take 2 tablets by mouth daily 10/8/20  Yes Yanni Villavicencio MD   midodrine (PROAMATINE) 5 MG tablet Take 1 tablet by mouth 3 times daily  Patient taking differently: Take 2.5 mg by mouth 3 times daily  6/4/20  Yes Yanni Villavicencio MD   carbidopa-levodopa (PARCOPA)  MG TBDP Take 1 tablet by mouth 3 times daily 3/31/20  Yes Vicente Gotti MD   aspirin 81 MG EC tablet Take 81 mg by mouth daily. Yes Historical Provider, MD   Diapers & Supplies MISC 1 each by Does not apply route 3 times daily Adult diapers or depends 7/10/20   MD Luisito Hernandez Dubs MISC by Does not apply route 3 prong cane 6/25/20   Vicente Gotti MD       Allergy(ies):  Patient has no known allergies. Social History:  TOBACCO:  reports that he quit smoking about 37 years ago. He has a 60.00 pack-year smoking history. He has never used smokeless tobacco.  ETOH:  reports no history of alcohol use. Family History:      Problem Relation Age of Onset    Cancer Mother         Bowel?  Colon Cancer Mother     Cancer Sister         Bowel?  Colon Cancer Sister     Heart Disease Neg Hx        Review of Systems:  Pertinent positives are listed in HPI. At least 10-point ROS reviewed and were negative. Vitals and physical examination:  BP (!) 178/93   Pulse 80   Temp 98.7 °F (37.1 °C) (Infrared)   Resp 14   Ht 5' 10\" (1.778 m)   Wt 110 lb (49.9 kg)   SpO2 99%   BMI 15.78 kg/m²   Gen/overall appearance: Not in acute distress. Alert. Oriented x3. Head: Normocephalic, atraumatic  Eyes: EOMI, good acuity  ENT: Oral mucosa moist  Neck: No JVD, thyromegaly  CVS: Nml S1S2, no MRG, RRR  Pulm: Clear bilaterally. No crackles/wheezes  Gastrointestinal: Soft, NT/ND, +BS  Musculoskeletal: No edema. Warm  Neuro: No focal deficit. Moves extremity spontaneously. Psychiatry: Appropriate affect. Not agitated. Skin: Warm, dry with normal turgor. No rash  Capillary refill: Brisk,< 3 seconds   Peripheral Pulses: +2 palpable, equal bilaterally       Labs/imaging/EKG:  CBC:   Recent Labs     01/07/21  1325   WBC 11.1*   HGB 11.1*        BMP:    Recent Labs     01/07/21  1325   *   K 3.6   CL 99   CO2 25   BUN 23*   CREATININE 0.9   GLUCOSE 92     Hepatic:   Recent Labs     01/07/21  1325   AST 12*   ALT <5*   BILITOT 0.6   ALKPHOS 113       Xr Chest (2 Vw)    Result Date: 1/7/2021  EXAMINATION: TWO XRAY VIEWS OF THE CHEST 1/7/2021 1:29 pm COMPARISON: 10/06/2018 HISTORY: ORDERING SYSTEM PROVIDED HISTORY: cp/sob TECHNOLOGIST PROVIDED HISTORY: Reason for exam:->cp/sob FINDINGS: The lungs are without acute focal process. There is no effusion or pneumothorax. The cardiomediastinal silhouette is stable. The osseous structures are stable. No acute process. EKG: Normal sinus rhythm with no acute ST changes. Inverted T changes in inferior leads. I reviewed EKG. Discussed with ER provider. Thank you Juan Johnson MD for the opportunity to be involved in this patient's care. -----------------------------  Obed Pierce MD  [facility]

## 2021-01-08 PROBLEM — E43 SEVERE MALNUTRITION (HCC): Chronic | Status: ACTIVE | Noted: 2021-01-08

## 2021-01-08 LAB
A/G RATIO: 1.2 (ref 1.1–2.2)
ALBUMIN SERPL-MCNC: 3.5 G/DL (ref 3.4–5)
ALP BLD-CCNC: 108 U/L (ref 40–129)
ALT SERPL-CCNC: <5 U/L (ref 10–40)
ANION GAP SERPL CALCULATED.3IONS-SCNC: 10 MMOL/L (ref 3–16)
APTT: 61.2 SEC (ref 24.2–36.2)
AST SERPL-CCNC: 11 U/L (ref 15–37)
BILIRUB SERPL-MCNC: 0.6 MG/DL (ref 0–1)
BUN BLDV-MCNC: 24 MG/DL (ref 7–20)
CALCIUM SERPL-MCNC: 9.1 MG/DL (ref 8.3–10.6)
CHLORIDE BLD-SCNC: 99 MMOL/L (ref 99–110)
CHOLESTEROL, TOTAL: 146 MG/DL (ref 0–199)
CO2: 25 MMOL/L (ref 21–32)
CREAT SERPL-MCNC: 0.9 MG/DL (ref 0.8–1.3)
EKG ATRIAL RATE: 79 BPM
EKG DIAGNOSIS: NORMAL
EKG P AXIS: 77 DEGREES
EKG P-R INTERVAL: 136 MS
EKG Q-T INTERVAL: 408 MS
EKG QRS DURATION: 98 MS
EKG QTC CALCULATION (BAZETT): 467 MS
EKG R AXIS: 66 DEGREES
EKG T AXIS: 24 DEGREES
EKG VENTRICULAR RATE: 79 BPM
GFR AFRICAN AMERICAN: >60
GFR NON-AFRICAN AMERICAN: >60
GLOBULIN: 3 G/DL
GLUCOSE BLD-MCNC: 122 MG/DL (ref 70–99)
HDLC SERPL-MCNC: 54 MG/DL (ref 40–60)
LDL CHOLESTEROL CALCULATED: 78 MG/DL
LEFT VENTRICULAR EJECTION FRACTION HIGH VALUE: 55 %
LEFT VENTRICULAR EJECTION FRACTION MODE: NORMAL
LV EF: 50 %
MAGNESIUM: 1.9 MG/DL (ref 1.8–2.4)
PHOSPHORUS: 3.5 MG/DL (ref 2.5–4.9)
POC ACT LR: 148 SEC
POC ACT LR: 200 SEC
POC ACT LR: 212 SEC
POC ACT LR: 243 SEC
POC ACT LR: 251 SEC
POC ACT LR: 282 SEC
POTASSIUM SERPL-SCNC: 4.1 MMOL/L (ref 3.5–5.1)
SODIUM BLD-SCNC: 134 MMOL/L (ref 136–145)
TOTAL PROTEIN: 6.5 G/DL (ref 6.4–8.2)
TRIGL SERPL-MCNC: 72 MG/DL (ref 0–150)
TROPONIN: <0.01 NG/ML
VLDLC SERPL CALC-MCNC: 14 MG/DL

## 2021-01-08 PROCEDURE — 93458 L HRT ARTERY/VENTRICLE ANGIO: CPT | Performed by: INTERNAL MEDICINE

## 2021-01-08 PROCEDURE — 6370000000 HC RX 637 (ALT 250 FOR IP): Performed by: INTERNAL MEDICINE

## 2021-01-08 PROCEDURE — B2151ZZ FLUOROSCOPY OF LEFT HEART USING LOW OSMOLAR CONTRAST: ICD-10-PCS | Performed by: INTERNAL MEDICINE

## 2021-01-08 PROCEDURE — 93005 ELECTROCARDIOGRAM TRACING: CPT | Performed by: INTERNAL MEDICINE

## 2021-01-08 PROCEDURE — C1725 CATH, TRANSLUMIN NON-LASER: HCPCS

## 2021-01-08 PROCEDURE — 99153 MOD SED SAME PHYS/QHP EA: CPT

## 2021-01-08 PROCEDURE — 84484 ASSAY OF TROPONIN QUANT: CPT

## 2021-01-08 PROCEDURE — B4101ZZ FLUOROSCOPY OF ABDOMINAL AORTA USING LOW OSMOLAR CONTRAST: ICD-10-PCS | Performed by: INTERNAL MEDICINE

## 2021-01-08 PROCEDURE — 80053 COMPREHEN METABOLIC PANEL: CPT

## 2021-01-08 PROCEDURE — G0278 ILIAC ART ANGIO,CARDIAC CATH: HCPCS

## 2021-01-08 PROCEDURE — 93458 L HRT ARTERY/VENTRICLE ANGIO: CPT

## 2021-01-08 PROCEDURE — 4A023N7 MEASUREMENT OF CARDIAC SAMPLING AND PRESSURE, LEFT HEART, PERCUTANEOUS APPROACH: ICD-10-PCS | Performed by: INTERNAL MEDICINE

## 2021-01-08 PROCEDURE — 2709999900 HC NON-CHARGEABLE SUPPLY

## 2021-01-08 PROCEDURE — 99152 MOD SED SAME PHYS/QHP 5/>YRS: CPT

## 2021-01-08 PROCEDURE — 84100 ASSAY OF PHOSPHORUS: CPT

## 2021-01-08 PROCEDURE — 85347 COAGULATION TIME ACTIVATED: CPT

## 2021-01-08 PROCEDURE — 36415 COLL VENOUS BLD VENIPUNCTURE: CPT

## 2021-01-08 PROCEDURE — C1894 INTRO/SHEATH, NON-LASER: HCPCS

## 2021-01-08 PROCEDURE — 027135Z DILATION OF CORONARY ARTERY, TWO ARTERIES WITH TWO DRUG-ELUTING INTRALUMINAL DEVICES, PERCUTANEOUS APPROACH: ICD-10-PCS | Performed by: INTERNAL MEDICINE

## 2021-01-08 PROCEDURE — 75625 CONTRAST EXAM ABDOMINL AORTA: CPT | Performed by: INTERNAL MEDICINE

## 2021-01-08 PROCEDURE — 93010 ELECTROCARDIOGRAM REPORT: CPT | Performed by: INTERNAL MEDICINE

## 2021-01-08 PROCEDURE — 99024 POSTOP FOLLOW-UP VISIT: CPT | Performed by: INTERNAL MEDICINE

## 2021-01-08 PROCEDURE — 6360000002 HC RX W HCPCS: Performed by: INTERNAL MEDICINE

## 2021-01-08 PROCEDURE — 2500000003 HC RX 250 WO HCPCS

## 2021-01-08 PROCEDURE — 99152 MOD SED SAME PHYS/QHP 5/>YRS: CPT | Performed by: INTERNAL MEDICINE

## 2021-01-08 PROCEDURE — C1760 CLOSURE DEV, VASC: HCPCS

## 2021-01-08 PROCEDURE — 2000000000 HC ICU R&B

## 2021-01-08 PROCEDURE — 6360000002 HC RX W HCPCS

## 2021-01-08 PROCEDURE — 83735 ASSAY OF MAGNESIUM: CPT

## 2021-01-08 PROCEDURE — 2580000003 HC RX 258: Performed by: INTERNAL MEDICINE

## 2021-01-08 PROCEDURE — 80061 LIPID PANEL: CPT

## 2021-01-08 PROCEDURE — 85730 THROMBOPLASTIN TIME PARTIAL: CPT

## 2021-01-08 PROCEDURE — C1769 GUIDE WIRE: HCPCS

## 2021-01-08 PROCEDURE — B2111ZZ FLUOROSCOPY OF MULTIPLE CORONARY ARTERIES USING LOW OSMOLAR CONTRAST: ICD-10-PCS | Performed by: INTERNAL MEDICINE

## 2021-01-08 PROCEDURE — C1874 STENT, COATED/COV W/DEL SYS: HCPCS

## 2021-01-08 PROCEDURE — 2580000003 HC RX 258

## 2021-01-08 PROCEDURE — 6360000004 HC RX CONTRAST MEDICATION: Performed by: INTERNAL MEDICINE

## 2021-01-08 PROCEDURE — 92928 PRQ TCAT PLMT NTRAC ST 1 LES: CPT | Performed by: INTERNAL MEDICINE

## 2021-01-08 PROCEDURE — C1887 CATHETER, GUIDING: HCPCS

## 2021-01-08 PROCEDURE — C9600 PERC DRUG-EL COR STENT SING: HCPCS

## 2021-01-08 RX ORDER — SODIUM CHLORIDE 0.9 % (FLUSH) 0.9 %
10 SYRINGE (ML) INJECTION EVERY 12 HOURS SCHEDULED
Status: DISCONTINUED | OUTPATIENT
Start: 2021-01-08 | End: 2021-01-09 | Stop reason: HOSPADM

## 2021-01-08 RX ORDER — SODIUM CHLORIDE 9 MG/ML
INJECTION, SOLUTION INTRAVENOUS CONTINUOUS
Status: DISCONTINUED | OUTPATIENT
Start: 2021-01-08 | End: 2021-01-09 | Stop reason: HOSPADM

## 2021-01-08 RX ORDER — HYDROCODONE BITARTRATE AND ACETAMINOPHEN 5; 325 MG/1; MG/1
1 TABLET ORAL EVERY 6 HOURS PRN
Status: DISCONTINUED | OUTPATIENT
Start: 2021-01-08 | End: 2021-01-09 | Stop reason: HOSPADM

## 2021-01-08 RX ORDER — ACETAMINOPHEN 325 MG/1
650 TABLET ORAL EVERY 4 HOURS PRN
Status: DISCONTINUED | OUTPATIENT
Start: 2021-01-08 | End: 2021-01-09 | Stop reason: HOSPADM

## 2021-01-08 RX ORDER — MORPHINE SULFATE 2 MG/ML
2 INJECTION, SOLUTION INTRAMUSCULAR; INTRAVENOUS
Status: DISCONTINUED | OUTPATIENT
Start: 2021-01-08 | End: 2021-01-09 | Stop reason: HOSPADM

## 2021-01-08 RX ORDER — SODIUM CHLORIDE 0.9 % (FLUSH) 0.9 %
10 SYRINGE (ML) INJECTION PRN
Status: DISCONTINUED | OUTPATIENT
Start: 2021-01-08 | End: 2021-01-09 | Stop reason: HOSPADM

## 2021-01-08 RX ADMIN — IOPAMIDOL 250 ML: 755 INJECTION, SOLUTION INTRAVENOUS at 12:35

## 2021-01-08 RX ADMIN — CARBIDOPA AND LEVODOPA 1 TABLET: 25; 100 TABLET, ORALLY DISINTEGRATING ORAL at 16:18

## 2021-01-08 RX ADMIN — ONDANSETRON 4 MG: 2 INJECTION INTRAMUSCULAR; INTRAVENOUS at 00:22

## 2021-01-08 RX ADMIN — Medication 10 ML: at 21:22

## 2021-01-08 RX ADMIN — CLOPIDOGREL BISULFATE 75 MG: 75 TABLET ORAL at 08:44

## 2021-01-08 RX ADMIN — CARVEDILOL 6.25 MG: 3.12 TABLET, FILM COATED ORAL at 08:44

## 2021-01-08 RX ADMIN — MORPHINE SULFATE 2 MG: 2 INJECTION, SOLUTION INTRAMUSCULAR; INTRAVENOUS at 00:14

## 2021-01-08 RX ADMIN — FLUDROCORTISONE ACETATE 0.1 MG: 0.1 TABLET ORAL at 08:44

## 2021-01-08 RX ADMIN — SODIUM CHLORIDE: 9 INJECTION, SOLUTION INTRAVENOUS at 17:33

## 2021-01-08 RX ADMIN — CARVEDILOL 6.25 MG: 3.12 TABLET, FILM COATED ORAL at 16:18

## 2021-01-08 RX ADMIN — ATORVASTATIN CALCIUM 80 MG: 80 TABLET, FILM COATED ORAL at 21:21

## 2021-01-08 RX ADMIN — ASPIRIN 81 MG: 81 TABLET, CHEWABLE ORAL at 08:44

## 2021-01-08 RX ADMIN — ONDANSETRON 4 MG: 2 INJECTION INTRAMUSCULAR; INTRAVENOUS at 06:58

## 2021-01-08 RX ADMIN — MORPHINE SULFATE 2 MG: 2 INJECTION, SOLUTION INTRAMUSCULAR; INTRAVENOUS at 04:25

## 2021-01-08 RX ADMIN — CARBIDOPA AND LEVODOPA 1 TABLET: 25; 100 TABLET, ORALLY DISINTEGRATING ORAL at 23:22

## 2021-01-08 RX ADMIN — CARBIDOPA AND LEVODOPA 1 TABLET: 25; 100 TABLET, ORALLY DISINTEGRATING ORAL at 08:44

## 2021-01-08 ASSESSMENT — PAIN DESCRIPTION - ORIENTATION: ORIENTATION: RIGHT

## 2021-01-08 ASSESSMENT — PAIN DESCRIPTION - LOCATION: LOCATION: HEAD;NECK

## 2021-01-08 ASSESSMENT — PAIN SCALES - GENERAL
PAINLEVEL_OUTOF10: 0
PAINLEVEL_OUTOF10: 8

## 2021-01-08 ASSESSMENT — PAIN DESCRIPTION - DESCRIPTORS: DESCRIPTORS: HEADACHE

## 2021-01-08 NOTE — PROGRESS NOTES
Assessment and VS complete. See flowsheet. Pt A&O. Pt ambulatory- gait strong and steady, but discouraged from getting out of bed independently d/t heparin gtt and high risk for bleeding. Pt states understanding. Pt bathed himself at sink with water and bath wipes. New gown. NSR. Night time meds given per MD order, including Morphine for c/o headache and right neck pain 7/10. Will reassess. Offered Tylenol, but pt states it will not help at all, would rather try Morphine. Pt does have some photosensitivity. Heparin gtt adjusted per aPTT results- see eMAR. Pt SPO2 100% on RA. No SOB or c/o CP at this time. Has Nitro patch on chest. Gingerale provided. Pt aware he is NPO at midnight. POC discussed. Pt denies further needs. Call light explained and in reach.

## 2021-01-08 NOTE — PROGRESS NOTES
Patient prepped and ready for Cath Lab. Consent signed and in chart. Wife updated on events and plan.

## 2021-01-08 NOTE — PROGRESS NOTES
PRN Meds: morphine, HYDROcodone 5 mg - acetaminophen, heparin (porcine), heparin (porcine), sodium chloride flush, promethazine **OR** ondansetron, acetaminophen **OR** acetaminophen, polyethylene glycol, morphine    Labs/imaging:  CBC:   Recent Labs     01/07/21  1325   WBC 11.1*   HGB 11.1*        BMP:    Recent Labs     01/07/21  1325 01/08/21  0407   * 134*   K 3.6 4.1   CL 99 99   CO2 25 25   BUN 23* 24*   CREATININE 0.9 0.9   GLUCOSE 92 122*     Hepatic:   Recent Labs     01/07/21  1325 01/08/21  0407   AST 12* 11*   ALT <5* <5*   BILITOT 0.6 0.6   ALKPHOS 113 108       Milton Lopez MD  -------------------------------  [facility]

## 2021-01-08 NOTE — PROGRESS NOTES
Paged MD regarding pt's continued head/neck pain 7/10. Pt asking for something to help with pain. 2mg Morphine not helping.

## 2021-01-08 NOTE — PRE SEDATION
Brief Pre-Op Note/Sedation Assessment      Javi Samayoa  1940  [facility]-3906/[id number]      [id number]  8:28 AM    Planned Procedure: Cardiac Catheterization Procedure    Post Procedure Plan: Return to same level of care    Consent: I have discussed with the patient and/or the patient representative the indication, alternatives, and the possible risks and/or complications of the planned procedure and the anesthesia methods. The patient and/or patient representative appear to understand and agree to proceed.     Chief Complaint: NSTEMI      Indications for Cath Procedure:  ACS <= 24 hrs  Anginal Classification within 2 weeks:  CCS IV - Inability to perform any activity without angina or angina at rest, i.e., severe limitation  NYHA Heart Failure Class within 2 weeks: No symptoms  Is Cath Lab Visit Valve-related?: No  Surgical Risk: Intermediate  Functional Type: >= 4 METS with symptoms    Anti- Anginal Meds within 2 weeks:   Yes: Beta Blockers, Aspirin and Statin (Any)    Stress or Imaging Studies Performed:  None     Vital Signs:  BP (!) 168/118   Pulse 77   Temp 97.2 °F (36.2 °C) (Temporal)   Resp 13   Ht 5' 10\" (1.778 m)   Wt 123 lb 14.4 oz (56.2 kg)   SpO2 99%   BMI 17.78 kg/m²     Allergies:  No Known Allergies    Past Medical History:  Past Medical History:   Diagnosis Date    CAD (coronary artery disease)     CABG and cardiac stents    COPD (chronic obstructive pulmonary disease) ([address]     Coronary artery bypass 1998    Coronary stent 2003    2    Hyperlipidemia     Hypertension     Osteoarthritis     Parkinson disease ([facility] 5/28/2019    Prostate cancer ([facility] 2004    Rotator cuff syndrome          Surgical History:  Past Surgical History:   Procedure Laterality Date    CARDIAC SURGERY  1998    CATARACT REMOVAL WITH IMPLANT  2015    COLONOSCOPY  2005    COLONOSCOPY N/A 8/30/2019    COLONOSCOPY WITH BIOPSY performed by Jean Paul Leong MD at [address] [place] [address] [address]    x 2    CORONARY ANGIOPLASTY  2013    x 1 stent    CORONARY ARTERY BYPASS GRAFT      1998, barrett    PROSTATECTOMY  2004    Cured, dr Tye Fontana N/A 1/25/2019    SIGMOIDOSCOPY SCREENING performed by Jc Almodovar MD at [address] [facility] ENDOSCOPY         Medications:  Current Facility-Administered Medications   Medication Dose Route Frequency Provider Last Rate Last Admin    morphine (PF) injection 2 mg  2 mg Intravenous Q3H PRN Lety Burton MD   2 mg at 01/08/21 0425    HYDROcodone-acetaminophen ([place] Coon) 5-325 MG per tablet 1 tablet  1 tablet Oral Q6H PRN Obed Pierce MD        heparin (porcine) injection 2,990 Units  60 Units/kg Intravenous PRN Mary Mcgrath MD        heparin (porcine) injection 1,500 Units  30 Units/kg Intravenous PRN Mary Mcgrath MD   1,500 Units at 01/07/21 2220    heparin 25,000 units in dextrose 5% 250 mL infusion  12 Units/kg/hr Intravenous Continuous Mary Mcgrath MD 7 mL/hr at 01/08/21 0500 14 Units/kg/hr at 01/08/21 0500    sodium chloride flush 0.9 % injection 10 mL  10 mL Intravenous 2 times per day Obed Pierce MD   10 mL at 01/07/21 2242    sodium chloride flush 0.9 % injection 10 mL  10 mL Intravenous PRN Obed Pierce MD        promethazine (PHENERGAN) tablet 12.5 mg  12.5 mg Oral Q6H PRN Obed Pierce MD        Or    ondansetron (ZOFRAN) injection 4 mg  4 mg Intravenous Q6H PRN Obed Pierce MD   4 mg at 01/08/21 2018    acetaminophen (TYLENOL) tablet 650 mg  650 mg Oral Q6H PRN Obed Pierce MD        Or    acetaminophen (TYLENOL) suppository 650 mg  650 mg Rectal Q6H PRN Oebd Pierce MD        polyethylene glycol (GLYCOLAX) packet 17 g  17 g Oral Daily PRN Obed Pierce MD        aspirin chewable tablet 81 mg  81 mg Oral Daily Obed Pierce MD        atorvastatin (LIPITOR) tablet 80 mg  80 mg Oral Nightly Obed Pierce MD   80 mg at 01/07/21 1084  carbidopa-levodopa (PARCOPA)  MG per disintegrating tablet 1 tablet  1 tablet Oral TID Nafisa Garcia MD   1 tablet at 01/07/21 2241    fludrocortisone (FLORINEF) tablet 0.1 mg  0.1 mg Oral Daily Nafisa Garcia MD        midodrine (PROAMATINE) tablet 2.5 mg  2.5 mg Oral TID  Nafisa Garcia MD        carvedilol (COREG) tablet 6.25 mg  6.25 mg Oral BID  Nafisa Garcia MD   6.25 mg at 01/07/21 2241    nitroGLYCERIN 50 mg in dextrose 5% 250 mL infusion  5 mcg/min Intravenous Continuous Nafisa Garcia MD        clopidogrel (PLAVIX) tablet 75 mg  75 mg Oral Daily Robe Maya DO        morphine (PF) injection 1 mg  1 mg Intravenous Q4H PRN Robe Maya DO               Pre-Sedation:    Pre-Sedation Documentation and Exam:  I have assessed the patient and agree with the H&P present on the chart. Prior History of Anesthesia Complications:   none    Modified Mallampati:  II (soft palate, uvula, fauces visible)    ASA Classification:  Class 3 - A patient with severe systemic disease that limits activity but is not incapacitating      Yumiko Scale: Activity:  2 - Able to move 4 extremities voluntarily on command  Respiration:  2 - Able to breathe deeply and cough freely  Circulation:  2 - BP+/- 20mmHg of normal  Consciousness:  2 - Fully awake  Oxygen Saturation (color):  2 - Able to maintain oxygen saturation >92% on room air    Sedation/Anesthesia Plan:  Guard the patient's safety and welfare. Minimize physical discomfort and pain. Minimize negative psychological responses to treatment by providing sedation and analgesia and maximize the potential amnesia. Patient to meet pre-procedure discharge plan.     Medication Planned:  midazolam intravenously and fentanyl intravenously    Patient is an appropriate candidate for plan of sedation: yes

## 2021-01-08 NOTE — OP NOTE
Clopidogrel started yesterday and recommended for 6-12 months  Continue aspirin, beta blocker, statin. Echocardiogram today. Gentle IVF. Aim for DC tomorrow pending clinical course. FU with Dr. Vivienne Serrano office. Shameka Horta DO, [facility]  Interventional Cardiology     o: [phone]  [facility] [address], [address]      NOTE:  This report was transcribed using voice recognition software. Every effort was made to ensure accuracy; however, inadvertent computerized transcription errors may be present.

## 2021-01-08 NOTE — PROGRESS NOTES
Cardiovascular Progress Note      Chief Complaint:   Chief Complaint   Patient presents with    Chest Pain     pt states he has had constant CP/SOB x2 days     Impression/Recommendations:    Mr. Marcos Way is a 79 y.o. male patient of Dr. Ally Huber:      UA/NSTEMI  MVCAD with Hx. CABG: PCI-RCA 2013; remote PCI LAD & RI, atretic LIMA  Hyperlipidemia  Parkinson Disease  COPD  Former tobacco  Hx. Orthostatic hypotension    Successful PCI of critical ostial to proximal RCA with two overlapping drug eluting stents extending to overlap previous mid JAMES (2013)  Clopidogrel started yesterday and recommended for 6-12 months  Continue aspirin, beta blocker, statin. Echocardiogram today. Gentle IVF. Aim for DC tomorrow pending clinical course. FU with Dr. Maribel Arevalo office. Interval History:  Pt. S/E. Ambulated without chest pain. No events overnight. No orthopnea, LE edema. Shows good understanding of angiogram and wishes to proceed.    ICU Tele: NSR no events     Medications:      morphine (PF) injection 2 mg, Q3H PRN      HYDROcodone-acetaminophen (NORCO) 5-325 MG per tablet 1 tablet, Q6H PRN      sodium chloride flush 0.9 % injection 10 mL, 2 times per day      sodium chloride flush 0.9 % injection 10 mL, PRN      acetaminophen (TYLENOL) tablet 650 mg, Q4H PRN      sodium chloride flush 0.9 % injection 10 mL, 2 times per day      sodium chloride flush 0.9 % injection 10 mL, PRN      promethazine (PHENERGAN) tablet 12.5 mg, Q6H PRN    Or      ondansetron (ZOFRAN) injection 4 mg, Q6H PRN      acetaminophen (TYLENOL) tablet 650 mg, Q6H PRN    Or      acetaminophen (TYLENOL) suppository 650 mg, Q6H PRN      polyethylene glycol (GLYCOLAX) packet 17 g, Daily PRN      aspirin chewable tablet 81 mg, Daily      atorvastatin (LIPITOR) tablet 80 mg, Nightly      carbidopa-levodopa (PARCOPA)  MG per disintegrating tablet 1 tablet, TID      fludrocortisone (FLORINEF) tablet 0.1 mg, Daily   midodrine (PROAMATINE) tablet 2.5 mg, TID WC      carvedilol (COREG) tablet 6.25 mg, BID WC      nitroGLYCERIN 50 mg in dextrose 5% 250 mL infusion, Continuous      clopidogrel (PLAVIX) tablet 75 mg, Daily      morphine (PF) injection 1 mg, Q4H PRN        I/O:     Intake/Output Summary (Last 24 hours) at 1/8/2021 1302  Last data filed at 1/8/2021 0800  Gross per 24 hour   Intake 108 ml   Output 300 ml   Net -192 ml       Physical Exam:    BP (!) 170/91   Pulse 80   Temp 97.7 °F (36.5 °C) (Temporal)   Resp 13   Ht 5' 10\" (1.778 m)   Wt 123 lb 14.4 oz (56.2 kg)   SpO2 97%   BMI 17.78 kg/m²   Wt Readings from Last 3 Encounters:   01/08/21 123 lb 14.4 oz (56.2 kg)   12/16/20 127 lb (57.6 kg)   10/08/20 123 lb (55.8 kg)       GENERAL: Well developed, well nourished, no acute distress  NEUROLOGICAL: Alert and oriented x3  PSYCH: Normal mood and affect   SKIN: Warm and dry, without lesions  HEENT: Normocephalic, atraumatic, Sclera non-icteric, mucous membranes moist  NECK: supple, JVP normal, thyroid not enlarged   CAROTID: Normal upstroke, no bruits  CARDIAC: Normal PMI, regular rate and rhythm, normal S1S2, no murmur, rub  RESPIRATORY: Normal respiratory effort, clear to auscultation bilaterally  EXTREMITIES: No cyanosis, clubbing or edema, palpable pulses bilaterally   MUSCULOSKELETAL: No joint swelling or tenderness, no chest wall tenderness  GASTROINTESTINAL:  soft, non-tender, no bruit    Data Review:    CBC:   Recent Labs     01/07/21  1325   WBC 11.1*   HGB 11.1*   HCT 35.3*   MCV 90.9        BMP:   Recent Labs     01/07/21  1325 01/08/21  0407   * 134*   K 3.6 4.1   CL 99 99   CO2 25 25   PHOS  --  3.5   BUN 23* 24*   CREATININE 0.9 0.9   GFRAA >60 >60   MG  --  1.90     LFTS:   Recent Labs     01/07/21  1325 01/08/21  0407   ALT <5* <5*   AST 12* 11*   ALKPHOS 113 108   PROT 6.7 6.5   AGRATIO 1.3 1.2   BILITOT 0.6 0.6     Cardiac Enzymes:   Recent Labs     01/07/21  1325 01/07/21 2234 01/08/21  0407   TROPONINI 0.02* 0.01 <0.01     PT/INR:   Recent Labs     01/07/21  1325   PROTIME 12.8   INR 1.10     APTT:   Recent Labs     01/07/21  1510 01/07/21  2132 01/08/21  0407   APTT 32.5 42.9* 61.2*         Nehemiah Tena DO, [address] [facility]  Interventional Cardiology     o: [phone]  [address] [place] [address], [address] [facility], [address] [place] [address]      NOTE:  This report was transcribed using voice recognition software. Every effort was made to ensure accuracy; however, inadvertent computerized transcription errors may be present.

## 2021-01-08 NOTE — PLAN OF CARE
Nutrition Problem #1: Underweight  Intervention: Food and/or Nutrient Delivery: Continue NPO(diet advancement per MD)  Nutritional Goals: Pt will consume at least 50% of meals

## 2021-01-08 NOTE — PROGRESS NOTES
Patient arrived to CVU room 11 from Cath lab. Patient pulled over from cath lab stretcher to bed d/t pressure dressing on R groin site. Patient is lethargic upon arrival. Patient states that he is agitated and is asking for family at bedside. VSS are as charted and R groin site remain stable.

## 2021-01-08 NOTE — PROGRESS NOTES
Comprehensive Nutrition Assessment    Type and Reason for Visit:  Positive Nutrition Screen(MST 4)    Nutrition Recommendations/Plan:   Encourage PO intake    Nutrition Assessment:  Pt is severely malnourished as evidenced by malnutrition assessment below. Pt states he has lost a significant amount of weight since being diagnosed with Parkinsons disease but is unable to tell me how much. Pt attributes decreased PO intake d/t difficulty swallowing, stating he \"gets choked\" if he isn't careful. Pt states he has been eating well lately. Weight has been stable since June 2020. Pt refuses any and all supplements, stating \"what he is doing works just fine. \" Will monitor for adequate PO intake during admission. Malnutrition Assessment:  Malnutrition Status:  Severe malnutrition    Context:  Chronic Illness     Findings of the 6 clinical characteristics of malnutrition:  Energy Intake:  7 - 75% or less estimated energy requirements for 1 month or longer  Weight Loss:  No significant weight loss     Body Fat Loss:  7 - Severe body fat loss Orbital   Muscle Mass Loss:  7 - Severe muscle mass loss Clavicles (pectoralis & deltoids)  Fluid Accumulation:  No significant fluid accumulation     Strength:  Not Performed    Estimated Daily Nutrient Needs:  Energy (kcal):  5853-9670; Weight Used for Energy Requirements:  Current(56 kg)     Protein (g):  67-84 grams; Weight Used for Protein Requirements:  Current(56 kg; 1.2-1.5 grams per kg)        Fluid (ml/day):   ; Method Used for Fluid Requirements:  1 ml/kcal      Nutrition Related Findings:  Na+ 134. No edema noted.       Wounds:  None       Current Nutrition Therapies:    Diet NPO, After Midnight    Anthropometric Measures:  · Height: 5' 10\" (177.8 cm)  · Current Body Weight: 123 lb (55.8 kg)   · Ideal Body Weight: 166 lbs   · BMI: 17.6  · BMI Categories: Underweight (BMI less than 18.5)       Nutrition Diagnosis: · Underweight related to swallowing difficulty as evidenced by BMI(pt report of choking with PO intake)      Nutrition Interventions:   Food and/or Nutrient Delivery:  Continue NPO(diet advancement per MD)  Nutrition Education/Counseling:  Education not indicated   Coordination of Nutrition Care:  Continue to monitor while inpatient    Goals:  Pt will consume at least 50% of meals       Nutrition Monitoring and Evaluation:   Behavioral-Environmental Outcomes:  Beliefs and Attitutes   Food/Nutrient Intake Outcomes:  Food and Nutrient Intake  Physical Signs/Symptoms Outcomes:  Weight     Discharge Planning:    No discharge needs at this time     Electronically signed by Dea West RD, LD on 1/8/21 at 8:52 AM EST    Contact: [phone]

## 2021-01-08 NOTE — ED NOTES
Report called to Heladio Meza RN in ICU, patient left this ER in stable condition.       Samira Beebe RN  01/07/21 2125

## 2021-01-09 VITALS
HEART RATE: 75 BPM | TEMPERATURE: 98.1 F | RESPIRATION RATE: 15 BRPM | BODY MASS INDEX: 17.04 KG/M2 | HEIGHT: 70 IN | OXYGEN SATURATION: 94 % | WEIGHT: 119.05 LBS | DIASTOLIC BLOOD PRESSURE: 75 MMHG | SYSTOLIC BLOOD PRESSURE: 132 MMHG

## 2021-01-09 LAB
A/G RATIO: 1.3 (ref 1.1–2.2)
ALBUMIN SERPL-MCNC: 3.5 G/DL (ref 3.4–5)
ALP BLD-CCNC: 94 U/L (ref 40–129)
ALT SERPL-CCNC: <5 U/L (ref 10–40)
ANION GAP SERPL CALCULATED.3IONS-SCNC: 9 MMOL/L (ref 3–16)
AST SERPL-CCNC: 10 U/L (ref 15–37)
BILIRUB SERPL-MCNC: 0.6 MG/DL (ref 0–1)
BUN BLDV-MCNC: 25 MG/DL (ref 7–20)
CALCIUM SERPL-MCNC: 9.1 MG/DL (ref 8.3–10.6)
CHLORIDE BLD-SCNC: 104 MMOL/L (ref 99–110)
CO2: 24 MMOL/L (ref 21–32)
CREAT SERPL-MCNC: 1 MG/DL (ref 0.8–1.3)
GFR AFRICAN AMERICAN: >60
GFR NON-AFRICAN AMERICAN: >60
GLOBULIN: 2.7 G/DL
GLUCOSE BLD-MCNC: 82 MG/DL (ref 70–99)
HCT VFR BLD CALC: 29.4 % (ref 40.5–52.5)
HEMOGLOBIN: 9.6 G/DL (ref 13.5–17.5)
MAGNESIUM: 1.9 MG/DL (ref 1.8–2.4)
MCH RBC QN AUTO: 29.5 PG (ref 26–34)
MCHC RBC AUTO-ENTMCNC: 32.7 G/DL (ref 31–36)
MCV RBC AUTO: 90.3 FL (ref 80–100)
PDW BLD-RTO: 14.8 % (ref 12.4–15.4)
PHOSPHORUS: 3.7 MG/DL (ref 2.5–4.9)
PLATELET # BLD: 162 K/UL (ref 135–450)
PMV BLD AUTO: 7.7 FL (ref 5–10.5)
POTASSIUM SERPL-SCNC: 4.3 MMOL/L (ref 3.5–5.1)
RBC # BLD: 3.25 M/UL (ref 4.2–5.9)
SODIUM BLD-SCNC: 137 MMOL/L (ref 136–145)
TOTAL PROTEIN: 6.2 G/DL (ref 6.4–8.2)
WBC # BLD: 7.9 K/UL (ref 4–11)

## 2021-01-09 PROCEDURE — 84100 ASSAY OF PHOSPHORUS: CPT

## 2021-01-09 PROCEDURE — 6370000000 HC RX 637 (ALT 250 FOR IP): Performed by: INTERNAL MEDICINE

## 2021-01-09 PROCEDURE — 85027 COMPLETE CBC AUTOMATED: CPT

## 2021-01-09 PROCEDURE — 80053 COMPREHEN METABOLIC PANEL: CPT

## 2021-01-09 PROCEDURE — 2580000003 HC RX 258: Performed by: INTERNAL MEDICINE

## 2021-01-09 PROCEDURE — 83735 ASSAY OF MAGNESIUM: CPT

## 2021-01-09 PROCEDURE — 99232 SBSQ HOSP IP/OBS MODERATE 35: CPT | Performed by: INTERNAL MEDICINE

## 2021-01-09 RX ORDER — ATORVASTATIN CALCIUM 20 MG/1
20 TABLET, FILM COATED ORAL NIGHTLY
Qty: 30 TABLET | Refills: 3 | Status: SHIPPED | OUTPATIENT
Start: 2021-01-09

## 2021-01-09 RX ORDER — CARVEDILOL 6.25 MG/1
6.25 TABLET ORAL 2 TIMES DAILY WITH MEALS
Qty: 60 TABLET | Refills: 0 | Status: ON HOLD | OUTPATIENT
Start: 2021-01-09 | End: 2021-01-13 | Stop reason: HOSPADM

## 2021-01-09 RX ORDER — DEXTROSE MONOHYDRATE 25 G/50ML
INJECTION, SOLUTION INTRAVENOUS
Status: DISCONTINUED
Start: 2021-01-09 | End: 2021-01-09 | Stop reason: HOSPADM

## 2021-01-09 RX ORDER — CLOPIDOGREL BISULFATE 75 MG/1
75 TABLET ORAL DAILY
Qty: 30 TABLET | Refills: 3 | Status: SHIPPED | OUTPATIENT
Start: 2021-01-10

## 2021-01-09 RX ORDER — NITROGLYCERIN 0.4 MG/1
0.4 TABLET SUBLINGUAL EVERY 5 MIN PRN
Qty: 25 TABLET | Refills: 0 | Status: SHIPPED | OUTPATIENT
Start: 2021-01-09

## 2021-01-09 RX ORDER — ASPIRIN 81 MG/1
81 TABLET ORAL DAILY
Qty: 30 TABLET | Refills: 3 | Status: SHIPPED | OUTPATIENT
Start: 2021-01-09 | End: 2021-03-12 | Stop reason: ALTCHOICE

## 2021-01-09 RX ADMIN — FLUDROCORTISONE ACETATE 0.1 MG: 0.1 TABLET ORAL at 08:45

## 2021-01-09 RX ADMIN — MIDODRINE HYDROCHLORIDE 2.5 MG: 5 TABLET ORAL at 08:45

## 2021-01-09 RX ADMIN — CARBIDOPA AND LEVODOPA 1 TABLET: 25; 100 TABLET, ORALLY DISINTEGRATING ORAL at 14:43

## 2021-01-09 RX ADMIN — CLOPIDOGREL BISULFATE 75 MG: 75 TABLET ORAL at 08:45

## 2021-01-09 RX ADMIN — ASPIRIN 81 MG: 81 TABLET, CHEWABLE ORAL at 08:45

## 2021-01-09 RX ADMIN — Medication 10 ML: at 08:46

## 2021-01-09 RX ADMIN — CARBIDOPA AND LEVODOPA 1 TABLET: 25; 100 TABLET, ORALLY DISINTEGRATING ORAL at 08:45

## 2021-01-09 RX ADMIN — CARVEDILOL 6.25 MG: 3.12 TABLET, FILM COATED ORAL at 08:45

## 2021-01-09 RX ADMIN — MIDODRINE HYDROCHLORIDE 2.5 MG: 5 TABLET ORAL at 12:45

## 2021-01-09 ASSESSMENT — PAIN SCALES - GENERAL
PAINLEVEL_OUTOF10: 0
PAINLEVEL_OUTOF10: 0

## 2021-01-09 NOTE — PROGRESS NOTES
Physician Progress Note      Marques Tran  [facility] #:                  [id number]  :                       1940  ADMIT DATE:       2021 1:19 PM  DISCH DATE:  RESPONDING  PROVIDER #:        Gabe Arndt MD          QUERY TEXT:    Patient admitted with chest pain, diagnosed with NSTEMI. Documentation of   severe malnutrition per Dietary consult on 2021. If possible, please   document in progress notes and discharge summary if you are evaluating and /or   treating any of the following: The medical record reflects the following:  Risk Factors: Elderly patient 79years old with Parkinson's disease  Clinical Indicators: Admission BMI 18.16; per Dietary consult: Pt is severely   malnourished  Malnutrition Status:  Severe malnutrition  Context:  Chronic Illness  Findings of the 6 clinical characteristics of malnutrition:  Energy Intake:  7 - 75% or less estimated energy requirements for 1 month or   longer  Body Fat Loss:  7 - Severe body fat loss Orbital  Muscle Mass Loss:  7 - Severe muscle mass loss Clavicles (pectoralis &   deltoids)  Treatment: Dietary consult  Options provided:  -- Protein calorie malnutrition mild  -- Protein calorie malnutrition moderate  -- Protein calorie malnutrition severe  -- Underweight with BMI ***  -- Other - I will add my own diagnosis  -- Disagree - Not applicable / Not valid  -- Disagree - Clinically unable to determine / Unknown  -- Refer to Clinical Documentation Reviewer    PROVIDER RESPONSE TEXT:    This patient has severe protein calorie malnutrition.     Query created by: Danette Calvillo on 2021 5:04 PM      Electronically signed by:  Gabe Arndt MD 2021 2:06 PM

## 2021-01-09 NOTE — PROGRESS NOTES
Data- discharge order received, pt verbalized agreement to discharge, disposition to previous residence, no needs for HHC/DME. Action- discharge instructions prepared and given to pt and wife, pt verbalized understanding. Medication information packet given r/t NEW and/or CHANGED prescriptions emphasizing name/purpose/side effects, pt verbalized understanding. Discharge instruction summary: Diet- Cardiac, Activity- As tolerated - post cath restrictions discussed. F/u appointment scheduled with Rosa Jackson, NP for 1/21/21 and wife made aware, immunizations reviewed, prescription medications sent to [address] [place] [address]. Inpatient surgical procedure precautions reviewed: PCI. Response- Pt belongings gathered, IV removed. Disposition is home (no HHC/DME needs), transported with wife and children, taken to lobby via w/c w/ this RN, no complications. Pt and wife ready and agreeable with discharge. Pt assisted with dressing without complication. No further questions from wife or pt at time of discharge. Verbalizes understanding to discharge instructions.

## 2021-01-09 NOTE — DISCHARGE SUMMARY
Hospital Discharge Summary    Patient's PCP: Kumar Rene MD  Admit Date: 1/7/2021   Discharge Date: 1/9/2021    Admitting Physician: Dr. Travon Graves MD  Discharge Physician: Dr. Fuller Sports:   Cecelia Reed HPI: Patient admitted with chest pain. Brief hospital course: 20-year-old male with history of CAD status post CABG, PCI's, history of COPD, hyperlipidemia, Parkinson's disease, orthostatic hypotension (on midodrine, Florinef), who presents to the emergency room with complaints of intermittent substernal chest discomfort, ongoing for the past 2 days. Nell Amaya is exertional component to chest discomfort; worsens with activities, improves with rest.  At the time of my evaluation in the emergency room, patient does not have chest pain.  He does have elevated troponin of 0.02; has had elevated troponin in the past as well.  There is new inverted T wave in inferior leads on EKG. He was evaluated by cardiologist, underwent cardiac catheterization and PCI with JAMES stent placement to critical ostial to proximal RCA with 2 overlapping drug-eluting stents extending to overlap previous mid JAMES. At this time, patient does not have any chest discomfort. He has been started on Plavix in addition to aspirin, beta-blocker, statin. He will need Plavix for at least 6 to 12 months, per cardiology recommendation. As needed nitroglycerin has been prescribed at the time of discharge. Invasive procedures:  Status post cardiac catheterization/PCI on January 8, 2021. Discharge Diagnoses:   1. Non-ST elevation MI.  2. Essential hypertension. 3. History of orthostatic hypotension. 4. Chronic daily headache. 5. Severe protein calorie malnutrition. 6. Other comorbidities: Other comorbidities: History of CAD status post CABG and PCI in the past, history of COPD, hyperlipidemia, Parkinson's disease, underweight with BMI of 15.78 kg/m². Physical Exam: /75   Pulse 75   Temp 98.1 °F (36.7 °C) (Temporal)   Resp 15   Ht 5' 10\" (1.778 m)   Wt 119 lb 0.8 oz (54 kg)   SpO2 94%   BMI 17.08 kg/m²   Gen/overall appearance: Not in acute distress. Alert. Head: Normocephalic, atraumatic  Eyes: EOMI, good acuity  ENT: Oral mucosa moist  Neck: No JVD, thyromegaly  CVS: Nml S1S2, no MRG, RRR  Pulm: Clear bilaterally. No crackles/wheezes  Gastrointestinal: Soft, NT/ND, +BS  Musculoskeletal: No edema. Warm  Neuro: No focal deficit. Moves extremity spontaneously. Psychiatry: Appropriate affect. Not agitated. Skin: Warm, dry with normal turgor. No rash  Capillary refill: Brisk,< 3 seconds   Peripheral Pulses: +2 palpable, equal bilaterally     Significant diagnostic studies that may require follow up:  Xr Chest (2 Vw)    Result Date: 1/7/2021  EXAMINATION: TWO XRAY VIEWS OF THE CHEST 1/7/2021 1:29 pm COMPARISON: 10/06/2018 HISTORY: ORDERING SYSTEM PROVIDED HISTORY: cp/sob TECHNOLOGIST PROVIDED HISTORY: Reason for exam:->cp/sob FINDINGS: The lungs are without acute focal process. There is no effusion or pneumothorax. The cardiomediastinal silhouette is stable. The osseous structures are stable. No acute process. Treatments: As above. Discharge Medications:     Medication List      START taking these medications    atorvastatin 20 MG tablet  Commonly known as: LIPITOR  Take 1 tablet by mouth nightly     carvedilol 6.25 MG tablet  Commonly known as: COREG  Take 1 tablet by mouth 2 times daily (with meals)     clopidogrel 75 MG tablet  Commonly known as: PLAVIX  Take 1 tablet by mouth daily  Start taking on: January 10, 2021     nitroGLYCERIN 0.4 MG SL tablet  Commonly known as: Nitrostat  Place 1 tablet under the tongue every 5 minutes as needed for Chest pain (up to max of 3 total doses. If no relief after 1 dose, call 911.) up to max of 3 total doses. If no relief after 1 dose, call 911.         CHANGE how you take these medications aspirin 81 MG EC tablet  Take 1 tablet by mouth daily  What changed: how much to take        CONTINUE taking these medications    carbidopa-levodopa  MG Tbdp  Commonly known as: PARCOPA  Take 1 tablet by mouth 3 times daily     fludrocortisone 0.1 MG tablet  Commonly known as: FLORINEF  Take 2 tablets by mouth daily     midodrine 2.5 MG tablet  Commonly known as: PROAMATINE        STOP taking these medications    Diapers & Supplies Misc     Tri Cane Misc           Where to Get Your Medications      These medications were sent to [address] [place] [address], [address] [place] [address] [phone]  [address] [place] [address],[address] [address]    Phone: [phone]   · aspirin 81 MG EC tablet  · atorvastatin 20 MG tablet  · carvedilol 6.25 MG tablet  · clopidogrel 75 MG tablet  · nitroGLYCERIN 0.4 MG SL tablet         Activity: activity as tolerated  Diet: DIET CARDIAC; No Caffeine      Disposition: home  Discharged Condition: Stable  Follow Up: Chanel Arteaga MD  [address] [facility]  [phone]    Schedule an appointment as soon as possible for a visit in 1 week      Shameka Horta DO  [address] [place] [address] [address] [facility] [phone] [facility]     Schedule an appointment as soon as possible for a visit in 1 week        Code status:  Full Code     Total time spent on discharge, finalizing medications, referrals and arranging outpatient follow up was more than 30 minutes      Thank you Dr. Chanel Arteaga MD for the opportunity to be involved in this patients care.

## 2021-01-09 NOTE — PROGRESS NOTES
Rt groin puncture site soft, CDI w/o complication. Pt is alert, oriented x3, disoriented to situation. Confused and hallucinated momentarily, able to get reoriented easily. VSS, NSR, RA, denies pain. Bed locked in lowest position w/ bed alarm on, call light within reach.

## 2021-01-09 NOTE — PROGRESS NOTES
Morning assessment complete. Pt resting in bed at this time. Fall precautions in place, call light and bedside table within reach and bed alarm engaged. Pt updated on POC and states understanding. VSS. Will continue to monitor.  Melody Meléndez 7:33 AM

## 2021-01-11 ENCOUNTER — TELEPHONE (OUTPATIENT)
Dept: CARDIOLOGY CLINIC | Age: 81
End: 2021-01-11

## 2021-01-11 ENCOUNTER — TELEPHONE (OUTPATIENT)
Dept: CARDIOLOGY | Age: 81
End: 2021-01-11

## 2021-01-11 ENCOUNTER — HOSPITAL ENCOUNTER (OUTPATIENT)
Age: 81
Setting detail: OBSERVATION
Discharge: NURSING, REHAB OR LONG-TERM CARE | End: 2021-01-15
Attending: FAMILY MEDICINE | Admitting: FAMILY MEDICINE
Payer: COMMERCIAL

## 2021-01-11 LAB
HCT VFR BLD CALC: 29.9 % (ref 40.5–52.5)
HEMOGLOBIN: 9.7 G/DL (ref 13.5–17.5)
MCH RBC QN AUTO: 29.2 PG (ref 26–34)
MCHC RBC AUTO-ENTMCNC: 32.3 G/DL (ref 31–36)
MCV RBC AUTO: 90.3 FL (ref 80–100)
PDW BLD-RTO: 14.4 % (ref 12.4–15.4)
PLATELET # BLD: 169 K/UL (ref 135–450)
PMV BLD AUTO: 7.8 FL (ref 5–10.5)
RBC # BLD: 3.31 M/UL (ref 4.2–5.9)
TROPONIN: 0.03 NG/ML
WBC # BLD: 8.6 K/UL (ref 4–11)

## 2021-01-11 PROCEDURE — G0378 HOSPITAL OBSERVATION PER HR: HCPCS

## 2021-01-11 PROCEDURE — 84484 ASSAY OF TROPONIN QUANT: CPT

## 2021-01-11 PROCEDURE — 85027 COMPLETE CBC AUTOMATED: CPT

## 2021-01-11 PROCEDURE — G0379 DIRECT REFER HOSPITAL OBSERV: HCPCS

## 2021-01-11 RX ORDER — ACETAMINOPHEN 325 MG/1
650 TABLET ORAL EVERY 6 HOURS PRN
Status: DISCONTINUED | OUTPATIENT
Start: 2021-01-11 | End: 2021-01-15 | Stop reason: HOSPADM

## 2021-01-11 RX ORDER — PROMETHAZINE HYDROCHLORIDE 25 MG/1
12.5 TABLET ORAL EVERY 6 HOURS PRN
Status: DISCONTINUED | OUTPATIENT
Start: 2021-01-11 | End: 2021-01-15 | Stop reason: HOSPADM

## 2021-01-11 RX ORDER — SODIUM CHLORIDE 0.9 % (FLUSH) 0.9 %
10 SYRINGE (ML) INJECTION EVERY 12 HOURS SCHEDULED
Status: DISCONTINUED | OUTPATIENT
Start: 2021-01-11 | End: 2021-01-15 | Stop reason: HOSPADM

## 2021-01-11 RX ORDER — ATORVASTATIN CALCIUM 40 MG/1
40 TABLET, FILM COATED ORAL NIGHTLY
Status: DISCONTINUED | OUTPATIENT
Start: 2021-01-11 | End: 2021-01-12 | Stop reason: ALTCHOICE

## 2021-01-11 RX ORDER — ASPIRIN 81 MG/1
81 TABLET, CHEWABLE ORAL DAILY
Status: DISCONTINUED | OUTPATIENT
Start: 2021-01-12 | End: 2021-01-15 | Stop reason: HOSPADM

## 2021-01-11 RX ORDER — POLYETHYLENE GLYCOL 3350 17 G/17G
17 POWDER, FOR SOLUTION ORAL DAILY PRN
Status: DISCONTINUED | OUTPATIENT
Start: 2021-01-11 | End: 2021-01-15 | Stop reason: HOSPADM

## 2021-01-11 RX ORDER — ACETAMINOPHEN 650 MG/1
650 SUPPOSITORY RECTAL EVERY 6 HOURS PRN
Status: DISCONTINUED | OUTPATIENT
Start: 2021-01-11 | End: 2021-01-15 | Stop reason: HOSPADM

## 2021-01-11 RX ORDER — ONDANSETRON 2 MG/ML
4 INJECTION INTRAMUSCULAR; INTRAVENOUS EVERY 6 HOURS PRN
Status: DISCONTINUED | OUTPATIENT
Start: 2021-01-11 | End: 2021-01-15 | Stop reason: HOSPADM

## 2021-01-11 RX ORDER — NITROGLYCERIN 0.4 MG/1
0.4 TABLET SUBLINGUAL EVERY 5 MIN PRN
Status: DISCONTINUED | OUTPATIENT
Start: 2021-01-11 | End: 2021-01-15 | Stop reason: HOSPADM

## 2021-01-11 RX ORDER — SODIUM CHLORIDE 0.9 % (FLUSH) 0.9 %
10 SYRINGE (ML) INJECTION PRN
Status: DISCONTINUED | OUTPATIENT
Start: 2021-01-11 | End: 2021-01-15 | Stop reason: HOSPADM

## 2021-01-11 ASSESSMENT — PAIN SCALES - GENERAL: PAINLEVEL_OUTOF10: 0

## 2021-01-11 NOTE — TELEPHONE ENCOUNTER
Discussed with ER Dr. Javier Cleaning at [facility]. Patient reportedly had one episode of chest pain. Reportedly took 1 SL Nitro and daughter called EMS when witnessing near syncope. Found to be hypotensive. ER workup with flat, mildly elevated troponins and acute on chronic anemia. Agreed with his recommendation to transfer to hospitalist service here for further workup.

## 2021-01-11 NOTE — TELEPHONE ENCOUNTER
Spoke to patient wife and she stated that she was a little confused on medications and wanted to clarify all the new ones - she also wanted to make sure NPKL was an appt with our office and that he  is still Dr Marian Aguirre patient.  She also wanted to schedule her own follow up with Dr Angelica Matthew     Patient wife was happy that we called , helped clarify and set up her own appt to follow up

## 2021-01-11 NOTE — TELEPHONE ENCOUNTER
Wife is very confused about how to give his meds . She states he was given 3 new meds for cholesterol and she doesn't know when to give them to him because she was not allowed in the hospital to talk to anyone . She gave him what she thought she was supposed to at 8:30am but needs to know if she was correct . please call soon .

## 2021-01-12 LAB
ALBUMIN SERPL-MCNC: 3.5 G/DL (ref 3.4–5)
ALP BLD-CCNC: 90 U/L (ref 40–129)
ALT SERPL-CCNC: 7 U/L (ref 10–40)
ANION GAP SERPL CALCULATED.3IONS-SCNC: 7 MMOL/L (ref 3–16)
AST SERPL-CCNC: 13 U/L (ref 15–37)
BACTERIA: ABNORMAL /HPF
BILIRUB SERPL-MCNC: 0.4 MG/DL (ref 0–1)
BILIRUBIN DIRECT: <0.2 MG/DL (ref 0–0.3)
BILIRUBIN URINE: ABNORMAL
BILIRUBIN, INDIRECT: ABNORMAL MG/DL (ref 0–1)
BLOOD, URINE: NEGATIVE
BUN BLDV-MCNC: 25 MG/DL (ref 7–20)
CALCIUM SERPL-MCNC: 9.2 MG/DL (ref 8.3–10.6)
CHLORIDE BLD-SCNC: 103 MMOL/L (ref 99–110)
CLARITY: ABNORMAL
CO2: 28 MMOL/L (ref 21–32)
COLOR: YELLOW
CREAT SERPL-MCNC: 1.1 MG/DL (ref 0.8–1.3)
EKG ATRIAL RATE: 78 BPM
EKG DIAGNOSIS: NORMAL
EKG P AXIS: 78 DEGREES
EKG P-R INTERVAL: 136 MS
EKG Q-T INTERVAL: 378 MS
EKG QRS DURATION: 82 MS
EKG QTC CALCULATION (BAZETT): 430 MS
EKG R AXIS: 73 DEGREES
EKG T AXIS: 36 DEGREES
EKG VENTRICULAR RATE: 78 BPM
EPITHELIAL CELLS, UA: 0 /HPF (ref 0–5)
FOLATE: 10.12 NG/ML (ref 4.78–24.2)
GFR AFRICAN AMERICAN: >60
GFR NON-AFRICAN AMERICAN: >60
GLUCOSE BLD-MCNC: 100 MG/DL (ref 70–99)
GLUCOSE URINE: NEGATIVE MG/DL
HCT VFR BLD CALC: 29.9 % (ref 40.5–52.5)
HEMOGLOBIN: 9.8 G/DL (ref 13.5–17.5)
HYALINE CASTS: 9 /LPF (ref 0–8)
IRON SATURATION: 15 % (ref 20–50)
IRON: 28 UG/DL (ref 59–158)
KETONES, URINE: NEGATIVE MG/DL
LACTIC ACID: 0.9 MMOL/L (ref 0.4–2)
LEUKOCYTE ESTERASE, URINE: ABNORMAL
MAGNESIUM: 1.8 MG/DL (ref 1.8–2.4)
MCH RBC QN AUTO: 29.2 PG (ref 26–34)
MCHC RBC AUTO-ENTMCNC: 32.8 G/DL (ref 31–36)
MCV RBC AUTO: 89 FL (ref 80–100)
MICROSCOPIC EXAMINATION: YES
NITRITE, URINE: NEGATIVE
PDW BLD-RTO: 14.4 % (ref 12.4–15.4)
PH UA: 6 (ref 5–8)
PLATELET # BLD: 165 K/UL (ref 135–450)
PMV BLD AUTO: 8.2 FL (ref 5–10.5)
POTASSIUM REFLEX MAGNESIUM: 3.5 MMOL/L (ref 3.5–5.1)
PROTEIN UA: ABNORMAL MG/DL
RBC # BLD: 3.35 M/UL (ref 4.2–5.9)
RBC UA: 5 /HPF (ref 0–4)
SODIUM BLD-SCNC: 138 MMOL/L (ref 136–145)
SPECIFIC GRAVITY UA: 1.02 (ref 1–1.03)
TOTAL IRON BINDING CAPACITY: 187 UG/DL (ref 260–445)
TOTAL PROTEIN: 6 G/DL (ref 6.4–8.2)
TROPONIN: 0.03 NG/ML
TSH REFLEX: 4.11 UIU/ML (ref 0.27–4.2)
URINE TYPE: ABNORMAL
UROBILINOGEN, URINE: 1 E.U./DL
VITAMIN B-12: 440 PG/ML (ref 211–911)
WBC # BLD: 7.4 K/UL (ref 4–11)
WBC UA: 21 /HPF (ref 0–5)

## 2021-01-12 PROCEDURE — 83540 ASSAY OF IRON: CPT

## 2021-01-12 PROCEDURE — 6370000000 HC RX 637 (ALT 250 FOR IP): Performed by: INTERNAL MEDICINE

## 2021-01-12 PROCEDURE — 93010 ELECTROCARDIOGRAM REPORT: CPT | Performed by: INTERNAL MEDICINE

## 2021-01-12 PROCEDURE — 82607 VITAMIN B-12: CPT

## 2021-01-12 PROCEDURE — 83550 IRON BINDING TEST: CPT

## 2021-01-12 PROCEDURE — 83735 ASSAY OF MAGNESIUM: CPT

## 2021-01-12 PROCEDURE — 84484 ASSAY OF TROPONIN QUANT: CPT

## 2021-01-12 PROCEDURE — 99214 OFFICE O/P EST MOD 30 MIN: CPT | Performed by: INTERNAL MEDICINE

## 2021-01-12 PROCEDURE — 80076 HEPATIC FUNCTION PANEL: CPT

## 2021-01-12 PROCEDURE — 36415 COLL VENOUS BLD VENIPUNCTURE: CPT

## 2021-01-12 PROCEDURE — 82746 ASSAY OF FOLIC ACID SERUM: CPT

## 2021-01-12 PROCEDURE — 83605 ASSAY OF LACTIC ACID: CPT

## 2021-01-12 PROCEDURE — 80048 BASIC METABOLIC PNL TOTAL CA: CPT

## 2021-01-12 PROCEDURE — 81001 URINALYSIS AUTO W/SCOPE: CPT

## 2021-01-12 PROCEDURE — 6360000002 HC RX W HCPCS: Performed by: INTERNAL MEDICINE

## 2021-01-12 PROCEDURE — 96372 THER/PROPH/DIAG INJ SC/IM: CPT

## 2021-01-12 PROCEDURE — G0378 HOSPITAL OBSERVATION PER HR: HCPCS

## 2021-01-12 PROCEDURE — 85027 COMPLETE CBC AUTOMATED: CPT

## 2021-01-12 PROCEDURE — 84443 ASSAY THYROID STIM HORMONE: CPT

## 2021-01-12 PROCEDURE — 2580000003 HC RX 258: Performed by: INTERNAL MEDICINE

## 2021-01-12 PROCEDURE — 93005 ELECTROCARDIOGRAM TRACING: CPT | Performed by: INTERNAL MEDICINE

## 2021-01-12 RX ORDER — ATORVASTATIN CALCIUM 20 MG/1
20 TABLET, FILM COATED ORAL NIGHTLY
Status: DISCONTINUED | OUTPATIENT
Start: 2021-01-12 | End: 2021-01-15 | Stop reason: HOSPADM

## 2021-01-12 RX ORDER — CLOPIDOGREL BISULFATE 75 MG/1
75 TABLET ORAL DAILY
Status: DISCONTINUED | OUTPATIENT
Start: 2021-01-12 | End: 2021-01-15 | Stop reason: HOSPADM

## 2021-01-12 RX ORDER — FLUDROCORTISONE ACETATE 0.1 MG/1
0.2 TABLET ORAL DAILY
Status: DISCONTINUED | OUTPATIENT
Start: 2021-01-12 | End: 2021-01-15 | Stop reason: HOSPADM

## 2021-01-12 RX ORDER — MIDODRINE HYDROCHLORIDE 5 MG/1
2.5 TABLET ORAL 3 TIMES DAILY
Status: DISCONTINUED | OUTPATIENT
Start: 2021-01-12 | End: 2021-01-12

## 2021-01-12 RX ORDER — LISINOPRIL 5 MG/1
5 TABLET ORAL DAILY
Status: DISCONTINUED | OUTPATIENT
Start: 2021-01-12 | End: 2021-01-13

## 2021-01-12 RX ORDER — ASPIRIN 81 MG/1
81 TABLET ORAL DAILY
Status: DISCONTINUED | OUTPATIENT
Start: 2021-01-12 | End: 2021-01-12 | Stop reason: SDUPTHER

## 2021-01-12 RX ORDER — CARBIDOPA AND LEVODOPA 25; 100 MG/1; MG/1
1 TABLET, ORALLY DISINTEGRATING ORAL 3 TIMES DAILY
Status: DISCONTINUED | OUTPATIENT
Start: 2021-01-12 | End: 2021-01-15 | Stop reason: HOSPADM

## 2021-01-12 RX ORDER — CARVEDILOL 6.25 MG/1
12.5 TABLET ORAL 2 TIMES DAILY WITH MEALS
Status: DISCONTINUED | OUTPATIENT
Start: 2021-01-12 | End: 2021-01-15

## 2021-01-12 RX ORDER — CARVEDILOL 6.25 MG/1
6.25 TABLET ORAL 2 TIMES DAILY WITH MEALS
Status: DISCONTINUED | OUTPATIENT
Start: 2021-01-12 | End: 2021-01-12

## 2021-01-12 RX ORDER — FAMOTIDINE 20 MG/1
20 TABLET, FILM COATED ORAL 2 TIMES DAILY
Status: DISCONTINUED | OUTPATIENT
Start: 2021-01-12 | End: 2021-01-15 | Stop reason: HOSPADM

## 2021-01-12 RX ADMIN — ATORVASTATIN CALCIUM 20 MG: 20 TABLET, FILM COATED ORAL at 21:07

## 2021-01-12 RX ADMIN — Medication 10 ML: at 21:07

## 2021-01-12 RX ADMIN — CLOPIDOGREL BISULFATE 75 MG: 75 TABLET ORAL at 09:27

## 2021-01-12 RX ADMIN — CARVEDILOL 12.5 MG: 6.25 TABLET, FILM COATED ORAL at 17:37

## 2021-01-12 RX ADMIN — Medication 10 ML: at 09:27

## 2021-01-12 RX ADMIN — ASPIRIN 81 MG: 81 TABLET, CHEWABLE ORAL at 09:27

## 2021-01-12 RX ADMIN — FLUDROCORTISONE ACETATE 0.2 MG: 0.1 TABLET ORAL at 09:27

## 2021-01-12 RX ADMIN — LISINOPRIL 5 MG: 5 TABLET ORAL at 09:26

## 2021-01-12 RX ADMIN — CARBIDOPA AND LEVODOPA 1 TABLET: 25; 100 TABLET, ORALLY DISINTEGRATING ORAL at 15:00

## 2021-01-12 RX ADMIN — CARVEDILOL 12.5 MG: 6.25 TABLET, FILM COATED ORAL at 09:27

## 2021-01-12 RX ADMIN — Medication 10 ML: at 01:01

## 2021-01-12 RX ADMIN — CARBIDOPA AND LEVODOPA 1 TABLET: 25; 100 TABLET, ORALLY DISINTEGRATING ORAL at 09:27

## 2021-01-12 RX ADMIN — CARBIDOPA AND LEVODOPA 1 TABLET: 25; 100 TABLET, ORALLY DISINTEGRATING ORAL at 21:07

## 2021-01-12 RX ADMIN — FAMOTIDINE 20 MG: 20 TABLET, FILM COATED ORAL at 09:27

## 2021-01-12 RX ADMIN — ENOXAPARIN SODIUM 40 MG: 40 INJECTION SUBCUTANEOUS at 09:26

## 2021-01-12 RX ADMIN — FAMOTIDINE 20 MG: 20 TABLET, FILM COATED ORAL at 21:07

## 2021-01-12 ASSESSMENT — PAIN SCALES - GENERAL
PAINLEVEL_OUTOF10: 0
PAINLEVEL_OUTOF10: 0

## 2021-01-12 NOTE — PROGRESS NOTES
Pt arrived via transport direct admit from [facility]. Telemetry on. Bp elevated but all othervital signs stable. Pt alert and oriented x4. No signs of distress at this time. Pt cooperative but anxious at this time. Bed alarm on, call light within reach, and pt educated to use call light for assistance.

## 2021-01-12 NOTE — PROGRESS NOTES
Hospitalist Progress Note      PCP: Chante Caicedo MD    Date of Admission: 1/11/2021    Chief Complaint: Chest pain    Hospital Course: 36-year-old gentleman with history of CAD status post PCI 1/8/2021 for NSTEMI with JAMES x2 to RCA is a direct admit from outside hospital with complaint of chest pain. Troponin is mildly elevated and stable. EKG is nonspecific and unchanged from prior. Cardiology evaluation pending. Subjective: Patient seen and examined. Currently chest pain-free. No shortness of breath, palpitations, dizziness. Medications:  Reviewed    Infusion Medications   Scheduled Medications    atorvastatin  20 mg Oral Nightly    carbidopa-levodopa  1 tablet Oral TID    carvedilol  6.25 mg Oral BID WC    fludrocortisone  0.2 mg Oral Daily    clopidogrel  75 mg Oral Daily    midodrine  2.5 mg Oral TID    famotidine  20 mg Oral BID    sodium chloride flush  10 mL Intravenous 2 times per day    aspirin  81 mg Oral Daily    enoxaparin  40 mg Subcutaneous Daily     PRN Meds: sodium chloride flush, promethazine **OR** ondansetron, acetaminophen **OR** acetaminophen, polyethylene glycol, nitroGLYCERIN      Intake/Output Summary (Last 24 hours) at 1/12/2021 0819  Last data filed at 1/12/2021 0415  Gross per 24 hour   Intake 10 ml   Output 100 ml   Net -90 ml       Physical Exam Performed:    BP (!) 172/99 Comment: after standing to use urinal   Pulse 78   Temp 97.8 °F (36.6 °C) (Oral)   Resp 16   Ht 5' 10\" (1.778 m)   Wt 120 lb 5.9 oz (54.6 kg)   SpO2 97%   BMI 17.27 kg/m²     Physical Exam    ... Labs:   Recent Labs     01/11/21 2212 01/12/21 0439   WBC 8.6 7.4   HGB 9.7* 9.8*   HCT 29.9* 29.9*    165     Recent Labs     01/12/21 0439      K 3.5      CO2 28   BUN 25*   CREATININE 1.1   CALCIUM 9.2     Recent Labs     01/12/21 0439   AST 13*   ALT 7*   BILIDIR <0.2   BILITOT 0.4   ALKPHOS 90     No results for input(s): INR in the last 72 hours.   Recent Labs     01/11/21  2212 01/12/21  0033   TROPONINI 0.03* 0.03*       Urinalysis:      Lab Results   Component Value Date    NITRU Negative 07/28/2019    WBCUA 0-2 07/16/2013    RBCUA 3-5 07/16/2013    BLOODU Negative 07/28/2019    SPECGRAV 1.023 07/28/2019    GLUCOSEU Negative 07/28/2019       Radiology:  No orders to display           Assessment/Plan:    Active Hospital Problems    Diagnosis    Chest pain [R07.9]     Chest pain  Typical  Significant prior history  Concern for restenosis  Cardiology consulted, patient will likely need recatheterization    Essential hypertension  Accelerated  We will increase Coreg from 6 75 to 12.5  We will add lisinopril    History of orthostatic hypotension  We will hold midodrine for now    History of CAD  As above    History of COPD  Not exacerbation    Normocytic anemia  Hemoglobin stable    Parkinson's disease  Continue with levodopa carbidopa    DVT Prophylaxis: Lovenox  Diet: DIET GENERAL; No Caffeine  Code Status: Full Code    Electronically signed by Memo Tam MD on 1/12/2021 at 8:19 AM

## 2021-01-12 NOTE — CONSULTS
[address] [facility]  [phone]      No chief complaint on file. Chest Pain        History of Present Illness:  Kurt Naranjo is a 79 y.o. patient who presented to the hospital with complaints of chest pain and hypotension. He was at home when he had episode of dizziness and chest pain that prompted hospitalization. He went to [facility] ed and was transferred here for elevated troponin and bp \"50/30\". He has h/o CAD CABG, PCI to RCA 3 days ago. He has orthostatic hypotension and parkinsons dementia. ER doc at OSH thought his wife came him too many pills. BP has been normal since arrival. Chest pain resolved prior to arrival at OSH and has not recurred. I have been asked to provide consultation regarding further management and testing. Past Medical History:   has a past medical history of CAD (coronary artery disease), COPD (chronic obstructive pulmonary disease) ([facility] [address] Coronary artery bypass, Coronary stent, Hyperlipidemia, Hypertension, Osteoarthritis, Parkinson disease ([facility] [address] Prostate cancer ([facility] [address] and Rotator cuff syndrome. Surgical History:   has a past surgical history that includes Prostatectomy (2004); Colonoscopy (2005); Coronary artery bypass graft; Cardiac surgery (1998); Coronary angioplasty (1993); Coronary angioplasty (2013); Cataract removal with implant (2015); sigmoidoscopy (N/A, 1/25/2019); and Colonoscopy (N/A, 8/30/2019). Social History:   reports that he quit smoking about 37 years ago. He has a 60.00 pack-year smoking history. He has never used smokeless tobacco. He reports that he does not drink alcohol or use drugs. Family History:  family history includes Cancer in his mother and sister; Colon Cancer in his mother and sister. Home Medications:  Were reviewed and are listed in nursing record. and/or listed below  Prior to Admission medications    Medication Sig Start Date End Date Taking?  Authorizing Provider   aspirin 81 MG EC tablet Take 1 tablet by mouth daily 1/9/21   Sylvester Cunningham MD   carvedilol (COREG) 6.25 MG tablet Take 1 tablet by mouth 2 times daily (with meals) 1/9/21   Sylvester Cunningham MD   clopidogrel (PLAVIX) 75 MG tablet Take 1 tablet by mouth daily 1/10/21   Sylvester Cunningham MD   atorvastatin (LIPITOR) 20 MG tablet Take 1 tablet by mouth nightly 1/9/21   Sylvester Cunningham MD   nitroGLYCERIN (NITROSTAT) 0.4 MG SL tablet Place 1 tablet under the tongue every 5 minutes as needed for Chest pain (up to max of 3 total doses. If no relief after 1 dose, call 911.) up to max of 3 total doses.  If no relief after 1 dose, call 911. 1/9/21   Sylvester Cunningham MD   midodrine (PROAMATINE) 2.5 MG tablet Take 2.5 mg by mouth 3 times daily    Historical Provider, MD   fludrocortisone (FLORINEF) 0.1 MG tablet Take 2 tablets by mouth daily 10/8/20   Mamta Schafer MD   carbidopa-levodopa (PARCOPA)  MG TBDP Take 1 tablet by mouth 3 times daily 3/31/20   Manny Her MD        Current Medications:  Current Facility-Administered Medications   Medication Dose Route Frequency Provider Last Rate Last Admin    atorvastatin (LIPITOR) tablet 20 mg  20 mg Oral Nightly Isabella Dill MD        carbidopa-levodopa (PARCOPA)  MG per disintegrating tablet 1 tablet  1 tablet Oral TID Isabella Dill MD   1 tablet at 01/12/21 0927    fludrocortisone (FLORINEF) tablet 0.2 mg  0.2 mg Oral Daily Isabella Dill MD   0.2 mg at 01/12/21 3877    clopidogrel (PLAVIX) tablet 75 mg  75 mg Oral Daily Isabella Dill MD   75 mg at 01/12/21 0927    famotidine (PEPCID) tablet 20 mg  20 mg Oral BID Isabella Dill MD   20 mg at 01/12/21 0927    carvedilol (COREG) tablet 12.5 mg  12.5 mg Oral BID ARACELI GONZALEZ MD   12.5 mg at 01/12/21 0927    lisinopril (PRINIVIL;ZESTRIL) tablet 5 mg  5 mg Oral Daily Jt GONZALEZ MD   5 mg at 01/12/21 0926    sodium chloride flush 0.9 % injection 10 mL  10 mL Intravenous 2 times per day Isabella Dill MD   10 mL at 01/12/21 0927    sodium chloride flush 0.9 % injection 10 mL  10 mL Intravenous PRN Tatyana Owen MD        promethazine (PHENERGAN) tablet 12.5 mg  12.5 mg Oral Q6H PRN Tatyana Owen MD        Or    ondansetron (ZOFRAN) injection 4 mg  4 mg Intravenous Q6H PRN Tatyana Owen MD        acetaminophen (TYLENOL) tablet 650 mg  650 mg Oral Q6H PRN Tatyana Owen MD        Or    acetaminophen (TYLENOL) suppository 650 mg  650 mg Rectal Q6H PRN Tatyana Owen MD        polyethylene glycol (GLYCOLAX) packet 17 g  17 g Oral Daily PRN Tatyana Owen MD        aspirin chewable tablet 81 mg  81 mg Oral Daily Tatyana Owen MD   81 mg at 01/12/21 0927    enoxaparin (LOVENOX) injection 40 mg  40 mg Subcutaneous Daily Tatyana Owen MD   40 mg at 01/12/21 0926    nitroGLYCERIN (NITROSTAT) SL tablet 0.4 mg  0.4 mg Sublingual Q5 Min PRN Tatyana Owen MD            Allergies:  Patient has no known allergies. Review of Systems:     · Constitutional: there has been no unanticipated weight loss. There's been no change in energy level, sleep pattern, or activity level. · Eyes: No visual changes or diplopia. No scleral icterus. · ENT: No Headaches, hearing loss or vertigo. No mouth sores or sore throat. · Cardiovascular: Reviewed in HPI  · Respiratory: No cough or wheezing, no sputum production. No hematemesis. · Gastrointestinal: No abdominal pain, appetite loss, blood in stools. No change in bowel or bladder habits. · Genitourinary: No dysuria, trouble voiding, or hematuria. · Musculoskeletal:  No gait disturbance, weakness or joint complaints. · Integumentary: No rash or pruritis. · Neurological: No headache, diplopia, change in muscle strength, numbness or tingling. No change in gait, balance, coordination, mood, affect, memory, mentation, behavior. · Psychiatric: No anxiety, no depression. · Endocrine: No malaise, fatigue or temperature intolerance. No excessive thirst, fluid intake, or urination.  No tremor. · Hematologic/Lymphatic: No abnormal bruising or bleeding, blood clots or swollen lymph nodes. · Allergic/Immunologic: No nasal congestion or hives.   ·     Physical Examination:    Vitals:    01/12/21 1230   BP: 132/86   Pulse: 70   Resp: 16   Temp: 98.2 °F (36.8 °C)   SpO2: 100%    Weight: 120 lb 5.9 oz (54.6 kg)         General Appearance:  Alert, cooperative, no distress, appears stated age   Head:  Normocephalic, without obvious abnormality, atraumatic   Eyes:  PERRL, conjunctiva/corneas clear       Nose: Nares normal, no drainage or sinus tenderness   Throat: Lips, mucosa, and tongue normal   Neck: Supple, symmetrical, trachea midline, no adenopathy, thyroid: not enlarged, symmetric, no tenderness/mass/nodules, no carotid bruit or JVD       Lungs:   Clear to auscultation bilaterally, respirations unlabored   Chest Wall:  No tenderness or deformity   Heart:  Regular rate and rhythm, S1, S2 normal, no murmur, rub or gallop   Abdomen:   Soft, non-tender, bowel sounds active all four quadrants,  no masses, no organomegaly           Extremities: Extremities normal, atraumatic, no cyanosis or edema   Pulses: 2+ and symmetric   Skin: Skin color, texture, turgor normal, no rashes or lesions   Pysch: Normal mood and affect   Neurologic: Normal gross motor and sensory exam.         Labs  CBC:   Lab Results   Component Value Date    WBC 7.4 01/12/2021    RBC 3.35 01/12/2021    HGB 9.8 01/12/2021    HCT 29.9 01/12/2021    MCV 89.0 01/12/2021    RDW 14.4 01/12/2021     01/12/2021     CMP:    Lab Results   Component Value Date     01/12/2021    K 3.5 01/12/2021     01/12/2021    CO2 28 01/12/2021    BUN 25 01/12/2021    CREATININE 1.1 01/12/2021    GFRAA >60 01/12/2021    GFRAA >60 05/07/2013    AGRATIO 1.3 01/09/2021    LABGLOM >60 01/12/2021    GLUCOSE 100 01/12/2021    PROT 6.0 01/12/2021    PROT 6.8 10/27/2010    CALCIUM 9.2 01/12/2021    BILITOT 0.4 01/12/2021    ALKPHOS 90 01/12/2021    AST 13 01/12/2021    ALT 7 01/12/2021     PT/INR:  No results found for: PTINR  Lab Results   Component Value Date    CKTOTAL 140 07/16/2013    CKMB 2.5 05/19/2015    TROPONINI 0.03 (H) 01/12/2021       EKG:  I have reviewed EKG with the following interpretation:  Impression:  Normal sinus rhythmNonspecific ST abnormalityAbnormal       All testing and labs listed below were personally reviewed. Assessment  Patient Active Problem List   Diagnosis    COPD (chronic obstructive pulmonary disease)    Coronary artery disease    Hypertension    Hyperlipidemia    Fever    SOB (shortness of breath)    Vertigo    Exertional dyspnea    Paroxysmal atrial fibrillation (HCC)    Parkinson disease ([facility] [address]    Hypotension    Chest pain    Severe malnutrition ([facility] [address]    NSTEMI (non-ST elevated myocardial infarction) ([facility] [address]         Plan:    I had the opportunity to review the clinical symptoms and presentation of Javier David. Assessment/Plan:  Active Problems:    Chest pain  Plan: Non cardiac chest pain. Reviewed the cath films. No residual severe ischemic CAD. NO lesion amenable to PCI. Troponin elevation post PCI is normal finding. No ACS. No further testing required. Hypotension: resolved. Cont florinef  CAD: cont DAPT, statin  HTN: cont coreg, lisinopril      OK for discharge home. FU as outpatient in 2 weeks. I will address the patient's cardiac risk factors and adjusted pharmacologic treatment as needed. In addition, I have reinforced the need for patient directed risk factor modification. Tobacco use was discussed with the patient and educated on the negative effects. I have asked the patient to not utilize these agents. Thank you for allowing to us to participate in the care or Javier David. Further evaluation will be based upon the patient's clinical course and testing results. All questions and concerns were addressed to the patient/family. Alternatives to my treatment were discussed.  The note was completed using EMR. Every effort was made to ensure accuracy; however, inadvertent computerized transcription errors may be present.     Edel Houston MD 1/12/2021 3:27 PM

## 2021-01-12 NOTE — H&P
ANGIOPLASTY  2013    x 1 stent    CORONARY ARTERY BYPASS GRAFT      1998, barrett    PROSTATECTOMY  2004    Cured, dr Frankel Rising SIGMOIDOSCOPY N/A 1/25/2019    SIGMOIDOSCOPY SCREENING performed by Leatha Gonzales MD at [facility]       Medications Prior to Admission:      Prior to Admission medications    Medication Sig Start Date End Date Taking? Authorizing Provider   aspirin 81 MG EC tablet Take 1 tablet by mouth daily 1/9/21   Sylvester Cunningham MD   carvedilol (COREG) 6.25 MG tablet Take 1 tablet by mouth 2 times daily (with meals) 1/9/21   Sylvester Cunningham MD   clopidogrel (PLAVIX) 75 MG tablet Take 1 tablet by mouth daily 1/10/21   Sylvester Cunningham MD   atorvastatin (LIPITOR) 20 MG tablet Take 1 tablet by mouth nightly 1/9/21   Sylvester Cunningham MD   nitroGLYCERIN (NITROSTAT) 0.4 MG SL tablet Place 1 tablet under the tongue every 5 minutes as needed for Chest pain (up to max of 3 total doses. If no relief after 1 dose, call 911.) up to max of 3 total doses. If no relief after 1 dose, call 911. 1/9/21   Sylvester Cunningham MD   midodrine (PROAMATINE) 2.5 MG tablet Take 2.5 mg by mouth 3 times daily    Historical Provider, MD   fludrocortisone (FLORINEF) 0.1 MG tablet Take 2 tablets by mouth daily 10/8/20   Mamta Schafer MD   carbidopa-levodopa (PARCOPA)  MG TBDP Take 1 tablet by mouth 3 times daily 3/31/20   Manny Her MD       Allergies:  Patient has no known allergies. Social History:      The patient currently lives at home    TOBACCO:   reports that he quit smoking about 37 years ago. He has a 60.00 pack-year smoking history. He has never used smokeless tobacco.  ETOH:   reports no history of alcohol use. E-Cigarettes/Vaping Use     Questions Responses    E-Cigarette/Vaping Use Never User    Start Date     Passive Exposure     Quit Date     Counseling Given     Comments             Family History:      Reviewed in detail and negative for DM, CAD, Cancer, CVA.  Positive as follows:        Problem Relation Age of Onset    Cancer Mother         Bowel?  Colon Cancer Mother     Cancer Sister         Bowel?  Colon Cancer Sister     Heart Disease Neg Hx        REVIEW OF SYSTEMS:   Pertinent positives as noted in the HPI. All other systems reviewed and negative. PHYSICAL EXAM PERFORMED:    BP (!) 153/81   Pulse 86   Temp 97.6 °F (36.4 °C) (Oral)   Resp 16   SpO2 96%     General appearance:  No apparent distress, appears stated age and cooperative. HEENT:  Normal cephalic, atraumatic without obvious deformity. Pupils equal, round, and reactive to light. Extra ocular muscles intact. Conjunctivae/corneas clear. Neck: Supple, with full range of motion. No jugular venous distention. Trachea midline. Respiratory:  Normal respiratory effort. Clear to auscultation, bilaterally without Rales/Wheezes/Rhonchi. Cardiovascular:  Regular rate and rhythm with normal S1/S2 without murmurs, rubs or gallops. Abdomen: Soft, non-tender, non-distended with normal bowel sounds. Musculoskeletal:  No clubbing, cyanosis or edema bilaterally. Full range of motion without deformity. Skin: Skin color, texture, turgor normal.  No rashes or lesions. Neurologic:  Neurovascularly intact without any focal sensory/motor deficits. Cranial nerves: II-XII intact, grossly non-focal.  Psychiatric:  Alert and oriented, thought content appropriate, normal insight  Capillary Refill: Brisk,< 3 seconds   Peripheral Pulses: +2 palpable, equal bilaterally       Labs:     Recent Labs     01/09/21  0615 01/11/21 2212   WBC 7.9 8.6   HGB 9.6* 9.7*   HCT 29.4* 29.9*    169     Recent Labs     01/09/21  0615      K 4.3      CO2 24   BUN 25*   CREATININE 1.0   CALCIUM 9.1   PHOS 3.7     Recent Labs     01/09/21  0615   AST 10*   ALT <5*   BILITOT 0.6   ALKPHOS 94     No results for input(s): INR in the last 72 hours.   Recent Labs     01/11/21 2212   TROPONINI 0.03*       Urinalysis:      Lab

## 2021-01-12 NOTE — PROGRESS NOTES
Notified Dr. Isaias Diaz that patients wife would like to be called for an update. No response on perfect serve. Updated patients daughter on medical status. She is concerned with patients confusion and would like to talk to attending tomorrow.

## 2021-01-13 LAB
HCT VFR BLD CALC: 29.2 % (ref 40.5–52.5)
HEMOGLOBIN: 9.6 G/DL (ref 13.5–17.5)
MCH RBC QN AUTO: 29.3 PG (ref 26–34)
MCHC RBC AUTO-ENTMCNC: 32.9 G/DL (ref 31–36)
MCV RBC AUTO: 89.2 FL (ref 80–100)
OCCULT BLOOD DIAGNOSTIC: NORMAL
PDW BLD-RTO: 14.3 % (ref 12.4–15.4)
PLATELET # BLD: 151 K/UL (ref 135–450)
PMV BLD AUTO: 8.4 FL (ref 5–10.5)
RBC # BLD: 3.27 M/UL (ref 4.2–5.9)
WBC # BLD: 8.1 K/UL (ref 4–11)

## 2021-01-13 PROCEDURE — 2580000003 HC RX 258: Performed by: INTERNAL MEDICINE

## 2021-01-13 PROCEDURE — 6370000000 HC RX 637 (ALT 250 FOR IP): Performed by: INTERNAL MEDICINE

## 2021-01-13 PROCEDURE — 6360000002 HC RX W HCPCS: Performed by: PHYSICIAN ASSISTANT

## 2021-01-13 PROCEDURE — 96366 THER/PROPH/DIAG IV INF ADDON: CPT

## 2021-01-13 PROCEDURE — 85027 COMPLETE CBC AUTOMATED: CPT

## 2021-01-13 PROCEDURE — G0328 FECAL BLOOD SCRN IMMUNOASSAY: HCPCS

## 2021-01-13 PROCEDURE — 94760 N-INVAS EAR/PLS OXIMETRY 1: CPT

## 2021-01-13 PROCEDURE — 96375 TX/PRO/DX INJ NEW DRUG ADDON: CPT

## 2021-01-13 PROCEDURE — 36415 COLL VENOUS BLD VENIPUNCTURE: CPT

## 2021-01-13 PROCEDURE — 6360000002 HC RX W HCPCS: Performed by: INTERNAL MEDICINE

## 2021-01-13 PROCEDURE — G0378 HOSPITAL OBSERVATION PER HR: HCPCS

## 2021-01-13 PROCEDURE — 96365 THER/PROPH/DIAG IV INF INIT: CPT

## 2021-01-13 RX ORDER — CIPROFLOXACIN 500 MG/1
500 TABLET, FILM COATED ORAL EVERY 12 HOURS SCHEDULED
Qty: 20 TABLET | Refills: 0 | Status: ON HOLD | OUTPATIENT
Start: 2021-01-13 | End: 2021-02-02 | Stop reason: HOSPADM

## 2021-01-13 RX ORDER — LORAZEPAM 2 MG/ML
0.5 INJECTION INTRAMUSCULAR ONCE
Status: COMPLETED | OUTPATIENT
Start: 2021-01-13 | End: 2021-01-13

## 2021-01-13 RX ORDER — CARVEDILOL 12.5 MG/1
12.5 TABLET ORAL 2 TIMES DAILY WITH MEALS
Qty: 60 TABLET | Refills: 3 | Status: SHIPPED | OUTPATIENT
Start: 2021-01-13 | End: 2021-01-15 | Stop reason: HOSPADM

## 2021-01-13 RX ORDER — CIPROFLOXACIN 500 MG/1
500 TABLET, FILM COATED ORAL EVERY 12 HOURS SCHEDULED
Status: DISCONTINUED | OUTPATIENT
Start: 2021-01-13 | End: 2021-01-15 | Stop reason: HOSPADM

## 2021-01-13 RX ORDER — LISINOPRIL 5 MG/1
5 TABLET ORAL DAILY
Qty: 30 TABLET | Refills: 3 | Status: SHIPPED | OUTPATIENT
Start: 2021-01-13 | End: 2021-01-15 | Stop reason: HOSPADM

## 2021-01-13 RX ORDER — LORAZEPAM 2 MG/ML
INJECTION INTRAMUSCULAR
Status: DISPENSED
Start: 2021-01-13 | End: 2021-01-14

## 2021-01-13 RX ORDER — LISINOPRIL 10 MG/1
10 TABLET ORAL DAILY
Status: DISCONTINUED | OUTPATIENT
Start: 2021-01-14 | End: 2021-01-15

## 2021-01-13 RX ADMIN — CIPROFLOXACIN 500 MG: 500 TABLET, FILM COATED ORAL at 10:13

## 2021-01-13 RX ADMIN — LISINOPRIL 5 MG: 5 TABLET ORAL at 10:13

## 2021-01-13 RX ADMIN — CARVEDILOL 12.5 MG: 6.25 TABLET, FILM COATED ORAL at 10:13

## 2021-01-13 RX ADMIN — CARBIDOPA AND LEVODOPA 1 TABLET: 25; 100 TABLET, ORALLY DISINTEGRATING ORAL at 19:56

## 2021-01-13 RX ADMIN — FAMOTIDINE 20 MG: 20 TABLET, FILM COATED ORAL at 19:57

## 2021-01-13 RX ADMIN — CLOPIDOGREL BISULFATE 75 MG: 75 TABLET ORAL at 10:13

## 2021-01-13 RX ADMIN — IRON SUCROSE 200 MG: 20 INJECTION, SOLUTION INTRAVENOUS at 15:49

## 2021-01-13 RX ADMIN — CIPROFLOXACIN 500 MG: 500 TABLET, FILM COATED ORAL at 19:56

## 2021-01-13 RX ADMIN — FAMOTIDINE 20 MG: 20 TABLET, FILM COATED ORAL at 12:15

## 2021-01-13 RX ADMIN — FLUDROCORTISONE ACETATE 0.2 MG: 0.1 TABLET ORAL at 10:13

## 2021-01-13 RX ADMIN — ASPIRIN 81 MG: 81 TABLET, CHEWABLE ORAL at 10:13

## 2021-01-13 RX ADMIN — CARBIDOPA AND LEVODOPA 1 TABLET: 25; 100 TABLET, ORALLY DISINTEGRATING ORAL at 10:13

## 2021-01-13 RX ADMIN — ATORVASTATIN CALCIUM 20 MG: 20 TABLET, FILM COATED ORAL at 19:57

## 2021-01-13 RX ADMIN — CARVEDILOL 12.5 MG: 6.25 TABLET, FILM COATED ORAL at 17:48

## 2021-01-13 RX ADMIN — Medication 10 ML: at 19:57

## 2021-01-13 RX ADMIN — CARBIDOPA AND LEVODOPA 1 TABLET: 25; 100 TABLET, ORALLY DISINTEGRATING ORAL at 14:55

## 2021-01-13 RX ADMIN — Medication 10 ML: at 10:12

## 2021-01-13 RX ADMIN — LORAZEPAM 0.5 MG: 2 INJECTION INTRAMUSCULAR; INTRAVENOUS at 22:43

## 2021-01-13 ASSESSMENT — PAIN SCALES - GENERAL
PAINLEVEL_OUTOF10: 0
PAINLEVEL_OUTOF10: 0

## 2021-01-13 NOTE — CARE COORDINATION
CM spoke with patient's wife in the room, she reports that she needs assistance managing patient's medications. They have a pill sorter box, but she cannot keep his prescriptions straight. She reports that the cane, walker, and scooter that patient's PCP ordered never came, she also inquires about a wheelchair. CM advised that once therapy evaluates her  and makes any durable medical equipment recommendations we can get what is needed from our in-house providers with an appropriate order. She wants home health, agreed to a referral to [facility]), was advised this agency can help her get her 's medications in a blister pack for dispensing. She reports that her daughter and grandson live with them but they are not home much in the evenings. Patient generally bathes himself, with her assistance at times. She stated that her  will not agree to skilled placement if indicated.     Jacinta Ribera, RAMYAN, CCM, RN  [facility]  [phone]

## 2021-01-13 NOTE — DISCHARGE INSTR - COC
Continuity of Care Form    Patient Name: Ching Lara   :  1940  MRN:  [id number]    Admit date:  2021  Discharge date:  ***    Code Status Order: Full Code   Advance Directives:   Advance Care Flowsheet Documentation     Date/Time Healthcare Directive Type of Healthcare Directive Copy in [address] [facility] [address] Agent's Name Healthcare Agent's Phone Number    [phone]  No, patient does not have an advance directive for healthcare treatment -- -- -- -- --          Admitting Physician:  Gian Wong MD  PCP: Maame Soriano MD    Discharging Nurse: [facility] Unit/Room#: 1KU-5681/7877-32  Discharging Unit Phone Number: ***    Emergency Contact:   Extended Emergency Contact Information  Primary Emergency Contact: Pooja Lombardi  Address: [address] [place] [address] Phone: [phone]  Mobile Phone: [phone]  Relation: Spouse  Secondary Emergency Contact: Mau Rios [facility] [address] [facility] Phone: [phone]  Mobile Phone: [phone]  Relation: Child    Past Surgical History:  Past Surgical History:   Procedure Laterality Date   [address] WITH IMPLANT      COLONOSCOPY      COLONOSCOPY N/A 2019    COLONOSCOPY WITH BIOPSY performed by Wyatt Rich MD at [facility]    x 2   [address]   2013    x 1 stent    CORONARY ARTERY BYPASS GRAFT      , barrett    PROSTATECTOMY  2004    Cured, dr Jerry aC N/A 2019    SIGMOIDOSCOPY SCREENING performed by Wyatt Rich MD at [address] [facility] ENDOSCOPY       Immunization History: There is no immunization history on file for this patient.     Active Problems:  Patient Active Problem List   Diagnosis Code    COPD (chronic obstructive pulmonary disease) J44.9    Coronary artery disease I25.10    Hypertension I10    Hyperlipidemia E78.5  Fever R50.9    SOB (shortness of breath) R06.02    Vertigo R42    Exertional dyspnea R06.00    Paroxysmal atrial fibrillation (HCC) I48.0    Parkinson disease (HCC) G20    Hypotension I95.9    Chest pain R07.9    Severe malnutrition (HCC) E43    NSTEMI (non-ST elevated myocardial infarction) (HCC) I21.4       Isolation/Infection:   Isolation          No Isolation        Patient Infection Status     None to display          Nurse Assessment:  Last Vital Signs: BP (!) 155/84   Pulse 79   Temp 97.9 °F (36.6 °C) (Oral)   Resp 18   Ht 5' 10\" (1.778 m)   Wt 121 lb 7.6 oz (55.1 kg)   SpO2 99%   BMI 17.43 kg/m²     Last documented pain score (0-10 scale): Pain Level: 0  Last Weight:   Wt Readings from Last 1 Encounters:   01/13/21 121 lb 7.6 oz (55.1 kg)     Mental Status:  {IP PT MENTAL STATUS:20030}    IV Access:  { CANDE IV ACCESS:610173763}    Nursing Mobility/ADLs:  Walking   {CHP DME LEEH:142275748}  Transfer  {CHP DME WGYM:034473113}  Bathing  {CHP DME GYSF:859953939}  Dressing  {CHP DME IYJA:657000820}  Toileting  {CHP DME MULQ:079579627}  Feeding  {CHP DME OZHQ:803677269}  Med Admin  {P DME UEYK:121891854}  Med Delivery   { CANDE MED Delivery:231152383}    Wound Care Documentation and Therapy:  Incision 11/20/13 Groin Right (Active)   Number of days: 2610        Elimination:  Continence:   · Bowel: {YES / BP:75159}  · Bladder: {YES / WM:62111}  Urinary Catheter: {Urinary Catheter:942689346}   Colostomy/Ileostomy/Ileal Conduit: {YES / TV:72381}       Date of Last BM: ***    Intake/Output Summary (Last 24 hours) at 1/13/2021 1402  Last data filed at 1/13/2021 0311  Gross per 24 hour   Intake 480 ml   Output 500 ml   Net -20 ml     I/O last 3 completed shifts:   In: 600 [P.O.:600]  Out: 750 [Urine:750]    Safety Concerns:     [address] Safety Concerns:352868061}    Impairments/Disabilities:      [address] Impairments/Disabilities:084532195}    Nutrition Therapy:  Current Nutrition Therapy:   [address] Diet List:283214466}    Routes of Feeding: {CHP DME Other Feedings:953946611}  Liquids: {Slp liquid thickness:13918}  Daily Fluid Restriction: {CHP DME Yes amt example:311556535}  Last Modified Barium Swallow with Video (Video Swallowing Test): {Done Not Done XWER:254694481}    Treatments at the Time of Hospital Discharge:   Respiratory Treatments: ***  Oxygen Therapy:  {Therapy; copd oxygen:64651}  Ventilator:    {[facility] Vent VOWS:974154641}    Rehab Therapies: {THERAPEUTIC INTERVENTION:5506716877}  Weight Bearing Status/Restrictions: {[facility] Weight Bearin}  Other Medical Equipment (for information only, NOT a DME order):  {EQUIPMENT:604400029}  Other Treatments: ***    Patient's personal belongings (please select all that are sent with patient):  {[facility] DME Belongings:756469091}    RN SIGNATURE:  {Esignature:968065118}    CASE MANAGEMENT/SOCIAL WORK SECTION    Inpatient Status Date: ***    Readmission Risk Assessment Score:  Readmission Risk              Risk of Unplanned Readmission:        0           Discharging to Facility/ Agency   · Name:   · Address:  · Phone:  · Fax:    Dialysis Facility (if applicable)   · Name:  · Address:  · Dialysis Schedule:  · Phone:  · Fax:    / signature: {Esignature:961585746}    PHYSICIAN SECTION    Prognosis: {Prognosis:2182183141}    Condition at Discharge: [address] [place] [address] Patient Condition:948028283}    Rehab Potential (if transferring to Rehab): {Prognosis:0518660732}    Recommended Labs or Other Treatments After Discharge: ***    Physician Certification: I certify the above information and transfer of George Ricardo  is necessary for the continuing treatment of the diagnosis listed and that he requires {Admit to Appropriate Level of Care:92015} for {GREATER/LESS:452429546} 30 days.      Update Admission H&P: {CHP DME Changes in AXLEC:974027770}    PHYSICIAN SIGNATURE:  {Esignature:217232374}

## 2021-01-13 NOTE — PROGRESS NOTES
Pt confused, trying to get out of bed, this RN explained to pt that he needs to press call light for assistance before getting up to prevent falls. Call light within reach, low bed in place, non-skid socks on, bed alarm on, and video monitoring system in place for patients safety.

## 2021-01-13 NOTE — PROGRESS NOTES
Hospitalist Progress Note      PCP: Chante Caicedo MD    Date of Admission: 1/11/2021    Chief Complaint: Chest pain    Hospital Course: 60-year-old gentleman with history of CAD status post PCI 1/8/2021 for NSTEMI with JAMES x2 to RCA is a direct admit from outside hospital with complaint of chest pain. Troponin is mildly elevated and stable. EKG is nonspecific and unchanged from prior. Cardiology evaluation complete and patient is cleared to go home from cardiology standpoint. Discharge was planned however patient spouse raising the issue of difficulty assisting patient at home and requested PT OT evaluation for possible SNF versus home care. Subjective: Patient seen and examined. Currently chest pain-free. No shortness of breath, palpitations, dizziness. I have had discussion with patient spouse at bedside. She states that she is turning 81 this Friday and said she has been struggling lately assisting patient with ADLs, ambulation, bathing, and medication management. She asks to talk to  to discuss possible arrangements for home care. She is also open to PT OT evaluation for possible short-term SNF.       Medications:  Reviewed    Infusion Medications   Scheduled Medications    ciprofloxacin  500 mg Oral 2 times per day    atorvastatin  20 mg Oral Nightly    carbidopa-levodopa  1 tablet Oral TID    fludrocortisone  0.2 mg Oral Daily    clopidogrel  75 mg Oral Daily    famotidine  20 mg Oral BID    carvedilol  12.5 mg Oral BID WC    lisinopril  5 mg Oral Daily    sodium chloride flush  10 mL Intravenous 2 times per day    aspirin  81 mg Oral Daily    enoxaparin  40 mg Subcutaneous Daily     PRN Meds: sodium chloride flush, promethazine **OR** ondansetron, acetaminophen **OR** acetaminophen, polyethylene glycol, nitroGLYCERIN      Intake/Output Summary (Last 24 hours) at 1/13/2021 8340  Last data filed at 1/13/2021 0311  Gross per 24 hour   Intake 240 ml   Output 300 ml Net -60 ml       Physical Exam Performed:    BP (!) 155/84   Pulse 79   Temp 97.9 °F (36.6 °C) (Oral)   Resp 18   Ht 5' 10\" (1.778 m)   Wt 121 lb 7.6 oz (55.1 kg)   SpO2 99%   BMI 17.43 kg/m²     Physical Exam    ... Labs:   Recent Labs     01/11/21 2212 01/12/21 0439 01/13/21  0509   WBC 8.6 7.4 8.1   HGB 9.7* 9.8* 9.6*   HCT 29.9* 29.9* 29.2*    165 151     Recent Labs     01/12/21 0439      K 3.5      CO2 28   BUN 25*   CREATININE 1.1   CALCIUM 9.2     Recent Labs     01/12/21 0439   AST 13*   ALT 7*   BILIDIR <0.2   BILITOT 0.4   ALKPHOS 90     No results for input(s): INR in the last 72 hours.   Recent Labs     01/11/21 2212 01/12/21  0033   TROPONINI 0.03* 0.03*       Urinalysis:      Lab Results   Component Value Date    NITRU Negative 01/12/2021    WBCUA 21 01/12/2021    BACTERIA 4+ 01/12/2021    RBCUA 5 01/12/2021    BLOODU Negative 01/12/2021    SPECGRAV 1.023 01/12/2021    GLUCOSEU Negative 01/12/2021       Radiology:  No orders to display           Assessment/Plan:    Active Hospital Problems    Diagnosis    Coronary artery disease [I25.10]     Priority: High    Hypertension [I10]     Priority: High    Chest pain [R07.9]    Paroxysmal atrial fibrillation (HCC) [I48.0]    Hyperlipidemia [E78.5]     Chest pain  Typical  Significant prior history  Evaluated by cardiology and cleared for discharge    Essential hypertension  Better controlled now  We will increase Coreg from 6 75 to 12.5  We will further increase lisinopril    Confusion  Intermittent  Not surprising in this patient with parkinsonism, dementia in the hospital setting  Attempt to frequently reorient patient    History of orthostatic hypotension  We will hold midodrine for now    History of CAD  As above    History of COPD  Not exacerbation    Normocytic anemia  GALDINO  Hemoglobin stable  We will give IV Venofer while inpatient    Parkinson's disease  Continue with levodopa carbidopa    DVT Prophylaxis:

## 2021-01-13 NOTE — CARE COORDINATION
Discharge Planning Assessment  RN discharge planner met with patient/ (and family member) to discuss reason for admission, current living situation, and potential needs at the time of discharge    Demographics/Insurance verified Yes address and insurance verified per face sheet    Current type of dwelling: house    Patient from ECF/SW confirmed with:n/a    Living arrangements:with wife, daughter nearby and supportive    Level of function/Support: independent, wife assists PRN    PCP: Brittany Wolfe    Last Visit to [facility] 2020    DME:enma has been approved for a walker and scooter but does not yet have these    Active with any community resources/agencies/skilled home care: [place] program for [employer] Whe[employer], [employer]    Medication compliance issues:not reported    Financial issues that could impact healthcare: none    Tentative discharge plan: home    *Discussed and provided facilities of choice if transition to a skilled nursing facility is required at the time of discharge      *Discussed with patient and/or family that on the day of discharge home tentative time of discharge will be between 10 AM and noon.     Transportation at the time of discharge: ANTIONETTE Carrasco, CCM, RN  [facility]  [phone]

## 2021-01-13 NOTE — CARE COORDINATION
CM left a voice message to patient's wife to discuss additional services patient may need at discharge.     RAMYA AndradeN, CCM, RN  [facility]  [phone]

## 2021-01-14 LAB
HCT VFR BLD CALC: 29.5 % (ref 40.5–52.5)
HEMOGLOBIN: 9.6 G/DL (ref 13.5–17.5)
MCH RBC QN AUTO: 29.5 PG (ref 26–34)
MCHC RBC AUTO-ENTMCNC: 32.7 G/DL (ref 31–36)
MCV RBC AUTO: 90.3 FL (ref 80–100)
PDW BLD-RTO: 14.9 % (ref 12.4–15.4)
PLATELET # BLD: 153 K/UL (ref 135–450)
PMV BLD AUTO: 7.9 FL (ref 5–10.5)
RBC # BLD: 3.27 M/UL (ref 4.2–5.9)
WBC # BLD: 8.1 K/UL (ref 4–11)

## 2021-01-14 PROCEDURE — 2580000003 HC RX 258: Performed by: INTERNAL MEDICINE

## 2021-01-14 PROCEDURE — 97162 PT EVAL MOD COMPLEX 30 MIN: CPT

## 2021-01-14 PROCEDURE — 6360000002 HC RX W HCPCS: Performed by: INTERNAL MEDICINE

## 2021-01-14 PROCEDURE — 6370000000 HC RX 637 (ALT 250 FOR IP): Performed by: INTERNAL MEDICINE

## 2021-01-14 PROCEDURE — 97535 SELF CARE MNGMENT TRAINING: CPT

## 2021-01-14 PROCEDURE — 97116 GAIT TRAINING THERAPY: CPT

## 2021-01-14 PROCEDURE — 97166 OT EVAL MOD COMPLEX 45 MIN: CPT

## 2021-01-14 PROCEDURE — 36415 COLL VENOUS BLD VENIPUNCTURE: CPT

## 2021-01-14 PROCEDURE — G0378 HOSPITAL OBSERVATION PER HR: HCPCS

## 2021-01-14 PROCEDURE — 94760 N-INVAS EAR/PLS OXIMETRY 1: CPT

## 2021-01-14 PROCEDURE — 85027 COMPLETE CBC AUTOMATED: CPT

## 2021-01-14 PROCEDURE — 96366 THER/PROPH/DIAG IV INF ADDON: CPT

## 2021-01-14 PROCEDURE — 96372 THER/PROPH/DIAG INJ SC/IM: CPT

## 2021-01-14 PROCEDURE — 92610 EVALUATE SWALLOWING FUNCTION: CPT

## 2021-01-14 PROCEDURE — 92526 ORAL FUNCTION THERAPY: CPT

## 2021-01-14 PROCEDURE — 97530 THERAPEUTIC ACTIVITIES: CPT

## 2021-01-14 RX ADMIN — IRON SUCROSE 200 MG: 20 INJECTION, SOLUTION INTRAVENOUS at 20:24

## 2021-01-14 RX ADMIN — CIPROFLOXACIN 500 MG: 500 TABLET, FILM COATED ORAL at 10:46

## 2021-01-14 RX ADMIN — Medication 10 ML: at 21:49

## 2021-01-14 RX ADMIN — CARBIDOPA AND LEVODOPA 1 TABLET: 25; 100 TABLET, ORALLY DISINTEGRATING ORAL at 10:45

## 2021-01-14 RX ADMIN — CARBIDOPA AND LEVODOPA 1 TABLET: 25; 100 TABLET, ORALLY DISINTEGRATING ORAL at 21:48

## 2021-01-14 RX ADMIN — CARBIDOPA AND LEVODOPA 1 TABLET: 25; 100 TABLET, ORALLY DISINTEGRATING ORAL at 16:00

## 2021-01-14 RX ADMIN — FAMOTIDINE 20 MG: 20 TABLET, FILM COATED ORAL at 21:48

## 2021-01-14 RX ADMIN — ASPIRIN 81 MG: 81 TABLET, CHEWABLE ORAL at 10:46

## 2021-01-14 RX ADMIN — ATORVASTATIN CALCIUM 20 MG: 20 TABLET, FILM COATED ORAL at 21:48

## 2021-01-14 RX ADMIN — ENOXAPARIN SODIUM 40 MG: 40 INJECTION SUBCUTANEOUS at 10:15

## 2021-01-14 RX ADMIN — CLOPIDOGREL BISULFATE 75 MG: 75 TABLET ORAL at 10:47

## 2021-01-14 RX ADMIN — CARVEDILOL 12.5 MG: 6.25 TABLET, FILM COATED ORAL at 20:28

## 2021-01-14 RX ADMIN — Medication 10 ML: at 10:15

## 2021-01-14 RX ADMIN — FAMOTIDINE 20 MG: 20 TABLET, FILM COATED ORAL at 10:46

## 2021-01-14 RX ADMIN — LISINOPRIL 10 MG: 10 TABLET ORAL at 10:47

## 2021-01-14 RX ADMIN — CIPROFLOXACIN 500 MG: 500 TABLET, FILM COATED ORAL at 21:48

## 2021-01-14 RX ADMIN — CARVEDILOL 12.5 MG: 6.25 TABLET, FILM COATED ORAL at 10:45

## 2021-01-14 RX ADMIN — FLUDROCORTISONE ACETATE 0.2 MG: 0.1 TABLET ORAL at 10:46

## 2021-01-14 ASSESSMENT — PAIN SCALES - GENERAL: PAINLEVEL_OUTOF10: 0

## 2021-01-14 NOTE — PROGRESS NOTES
Shift assessment complete. VSS. Patient medicated. Patient attempting to climb out of bed multiple times. Continuing to redirect patient. Patient remains in low, lcoked bed. Side rails up. Call light within reach. Camera in room for safety.

## 2021-01-14 NOTE — PROGRESS NOTES
Physical Therapy    Facility/Department: [address]  Initial Assessment    NAME: Aminah Rosenbaum  : 1940  MRN: [id number]    Date of Service: 2021    Discharge Recommendations:  Aminah Rosenbaum scored a 15/24 on the AM-PAC short mobility form. Current research shows that an AM-PAC score of 17 or less is typically not associated with a discharge to the patient's home setting. Based on the patient's AM-PAC score and their current functional mobility deficits, it is recommended that the patient have 5-7 sessions per week of Physical Therapy at d/c to increase the patient's independence. At this time, this patient demonstrates the endurance, and/or tolerance for 3 hours of therapy each day, with a treatment frequency of 5-7x/wk. Please see assessment section for further patient specific details. If patient discharges prior to next session this note will serve as a discharge summary. Please see below for the latest assessment towards goals. 5-7 sessions per week   PT Equipment Recommendations  Equipment Needed: No  Other: if discharged home would benefit from RW    Assessment   Body structures, Functions, Activity limitations: Decreased functional mobility ; Decreased ROM; Decreased safe awareness;Decreased endurance;Decreased balance  Assessment: Patient not at baseline function and would benefit from skilled PT to address above deficits and facilitate return to baseline function. Patient presents at significant risk of falls and not safe to return to current home environment at this time.   Treatment Diagnosis: decreased functional mobility, impaired gait, decreased balance  Prognosis: Good  Decision Making: Medium Complexity  Clinical Presentation: evolving  PT Education: PT Role;Goals;Plan of Care;General Safety  Patient Education: d/c recommendations - verbalized understanding but recommend continued reinforcement  Barriers to Learning: cognitive  REQUIRES PT FOLLOW UP: Yes  Activity Tolerance  Activity Tolerance: Patient Tolerated treatment well  Activity Tolerance: delayed responses, increased time to complete all tasks, frequent rest breaks       Patient Diagnosis(es): There were no encounter diagnoses. has a past medical history of CAD (coronary artery disease), COPD (chronic obstructive pulmonary disease) ([address] Coronary artery bypass, Coronary stent, Hyperlipidemia, Hypertension, Osteoarthritis, Parkinson disease ([address] Prostate cancer ([facility] and Rotator cuff syndrome. has a past surgical history that includes Prostatectomy (2004); Colonoscopy (2005); Coronary artery bypass graft; Cardiac surgery (1998); Coronary angioplasty (1993); Coronary angioplasty (2013); Cataract removal with implant (2015); sigmoidoscopy (N/A, 1/25/2019); and Colonoscopy (N/A, 8/30/2019). Restrictions  Restrictions/Precautions  Restrictions/Precautions: Fall Risk(High fall risk)  Required Braces or Orthoses?: No  Position Activity Restriction  Other position/activity restrictions: 67-year-old gentleman with history of CAD status post PCI 1/8/2021 for NSTEMI with JAMES x2 to RCA is a direct admit from outside hospital with complaint of chest pain. Troponin is mildly elevated and stable. EKG is nonspecific and unchanged from prior. Cardiology evaluation complete and patient is cleared to go home from cardiology standpoint. Discharge was planned however patient spouse raising the issue of difficulty assisting patient at home and requested PT OT evaluation for possible SNF versus home care. Vision/Hearing  Vision: Within Functional Limits  Hearing: Exceptions to [facility]  Hearing Exceptions: Hard of hearing/hearing concerns     Subjective  General  Chart Reviewed: Yes  Family / Caregiver Present: No  Diagnosis: chest pain  Follows Commands: Within Functional Limits  General Comment  Comments: supine in bed upon arrival with alarm on and telesitter in place.  Increased time needed to complete all tasks  Subjective  Subjective: Denied pain. Asleep upon arrival. Agreeable to therapy.  Reports he feels much confused and reports \"this is the first time I have actually known I was in the hospital\"  Pain Screening  Patient Currently in Pain: Denies          Orientation  Orientation  Overall Orientation Status: Within Functional Limits  Social/Functional History  Social/Functional History  Lives With: Family(spouse, daughter and grandson)  Type of Home: House  Home Layout: Two level, Bed/Bath upstairs(states kitchen is upstairs and he sleeps in lower level, enters through basement)  Home Access: Level entry  Bathroom Shower/Tub: Walk-in shower  Bathroom Toilet: Standard  Bathroom Equipment: Grab bars in shower  Bathroom Accessibility: Walker accessible  Home Equipment: Cane  ADL Assistance: Needs assistance(states spouse assists PRN (drying off after showers))  Homemaking Responsibilities: No(spouse/daughter performs)  Ambulation Assistance: Independent(states he holds onto furniture at home, PRN use of [facility])  Transfer Assistance: Independent  Active : No  Patient's  Info: spouse/daughter provide transportation  Occupation: Retired  Type of occupation: Retired confstruction  Additional Comments: states approved for [facility] and [address] [place] [address] but has not been delievered, states cardiologist told him he needed a scooter, states 1 recent fall    Objective          AROM RLE (degrees)  RLE AROM: WFL  AROM LLE (degrees)  LLE AROM : WFL  Strength RLE  Strength RLE: WFL(grossly 4/5, hip flexion 3+/5)  Strength LLE  Strength LLE: WFL(grossly 4/5, hip flexion 3+/5)     Sensation  Overall Sensation Status: WFL  Bed mobility  Scooting: Stand by assistance  Transfers  Sit to Stand: Contact guard assistance;Minimal Assistance(CGA from bed and chair with cues for hand placement, min A from toilet)  Stand to sit: Minimal Assistance(poor eccentric control)  Ambulation 1  Surface: level tile  Device: Rolling Walker  Assistance: Contact guard assistance;Minimal assistance  Quality of Gait: unsteady gait, poor walker management, decreased ja, assistance for walker management  Distance: 21' + 10' + 10'  Comments: declined further ambulation due to fatigue  Ambulation 2  Surface - 2: level tile  Device 2: No device  Assistance 2: Minimal assistance  Quality of Gait 2: unsteady, shuffling gait, reaching for environmental surfaces for support  Distance: 15'     Balance  Sitting - Static: Good  Sitting - Dynamic: Good  Standing - Static: Fair  Standing - Dynamic: Fair  Comments: sat EOB 10-15 mintues engaged in UE ADL task with supervision, requested to drink OJ. Coughing noted. Patient reports he was recommended to be on thickened liquids but he doesn't like them. Nursing present and SLP consult recommended. Plan   Plan  Times per week: 3-5  Times per day: Daily  Current Treatment Recommendations: Strengthening, Balance Training, Functional Mobility Training, Transfer Training, Gait Training, Stair training, Home Exercise Program, Safety Education & Training, Endurance Training  Safety Devices  Type of devices: All fall risk precautions in place, Bed alarm in place, Call light within reach, Telesitter in use, Patient at risk for falls, Left in bed, Nurse notified  Restraints  Initially in place: No      AM-PAC Score  AM-PAC Inpatient Mobility Raw Score : 15 (01/14/21 1140)  AM-PAC Inpatient T-Scale Score : 39.45 (01/14/21 1140)  Mobility Inpatient CMS 0-100% Score: 57.7 (01/14/21 1140)  Mobility Inpatient CMS G-Code Modifier : CK (01/14/21 1140)          Goals  Short term goals  Time Frame for Short term goals:  To be met prior to discharge  Short term goal 1: Bed mobiltiy with min A  Short term goal 2: Sit to/from stand with SBA  Short term goal 3: Ambulate 48' with RW and CGA  Short term goal 4: Navigate up/down 4 steps with rail and CGA  Patient Goals   Patient goals : to get home       Therapy Time   Individual Concurrent Group Co-treatment   Time In 1012         Time Out 1120         Minutes 68         Timed Code Treatment Minutes: 23 Minutes     Units billed shared with OT due to observation status.      Felicia Livingston, BETY    Thanks, Felicia Livingston PT, DPT [id number]

## 2021-01-14 NOTE — PLAN OF CARE
Problem: Falls - Risk of:  Goal: Will remain free from falls  Description: Will remain free from falls  Outcome: Ongoing     Problem: Skin Integrity:  Goal: Will show no infection signs and symptoms  Description: Will show no infection signs and symptoms  Outcome: Ongoing     Problem: Skin Integrity:  Goal: Absence of new skin breakdown  Description: Absence of new skin breakdown  Outcome: Ongoing

## 2021-01-14 NOTE — PROGRESS NOTES
Occupational Therapy   Occupational Therapy Initial Assessment  Date: 2021   Patient Name: Lauren Naik  MRN: [id number]     : 1940    Date of Service: 2021    Discharge Recommendations: Lauren Naik scored a 15/24 on the AM-PAC ADL Inpatient form. Current research shows that an AM-PAC score of 17 or less is typically not associated with a discharge to the patient's home setting. Based on the patient's AM-PAC score and their current ADL deficits, it is recommended that the patient have 5-7 sessions per week of Occupational Therapy at d/c to increase the patient's independence. At this time, this patient demonstrates the endurance, and/or tolerance for 3 hours of therapy each day, with a treatment frequency of 5-7x/wk. Please see assessment section for further patient specific details. If patient discharges prior to next session this note will serve as a discharge summary. Please see below for the latest assessment towards goals. OT Equipment Recommendations  Equipment Needed: Yes  Mobility Devices: Read Cordia: Rolling  Other: Chart reports a cane, electric scooter and RW were recently ordered but not delivered. May need a stair lift    Assessment   Performance deficits / Impairments: Decreased functional mobility ; Decreased endurance;Decreased ADL status; Decreased safe awareness;Decreased cognition;Decreased fine motor control  Assessment: Patient is not at baseline level following admission with chest pain. Chart reports wife having increased difficulty caring for patient at home. Patient would benefit from skilled OT treatment  Treatment Diagnosis: Debility and decreased ADLs due to chest pain, Parkinsons recent NSTEMI. Prognosis: Fair;Good  Decision Making: Medium Complexity  History: Wife helps with ADLs and IADLs, furniture walks in home, Parkinsons  Exam: Periods of confusion, CGA/min assist with transfers, mod/mas assist ADLs, intention tremors.   Assistance / Modification: RW, evolving presentation  OT Education: OT Role;Plan of Care  Patient Education: Patient needs continued education on ADLs and transfers  Barriers to Learning: Cognition  REQUIRES OT FOLLOW UP: Yes  Activity Tolerance  Activity Tolerance: Patient Tolerated treatment well;Patient limited by fatigue  Safety Devices  Safety Devices in place: Yes  Type of devices: All fall risk precautions in place; Left in bed;Nurse notified;Call light within reach; Patient at risk for falls; Bed alarm in place  Restraints  Initially in place: No           Patient Diagnosis(es): There were no encounter diagnoses. has a past medical history of CAD (coronary artery disease), COPD (chronic obstructive pulmonary disease) ([facility] [address] Coronary artery bypass, Coronary stent, Hyperlipidemia, Hypertension, Osteoarthritis, Parkinson disease ([facility] [address] Prostate cancer ([facility] [address] and Rotator cuff syndrome. has a past surgical history that includes Prostatectomy (2004); Colonoscopy (2005); Coronary artery bypass graft; Cardiac surgery (1998); Coronary angioplasty (1993); Coronary angioplasty (2013); Cataract removal with implant (2015); sigmoidoscopy (N/A, 1/25/2019); and Colonoscopy (N/A, 8/30/2019). Treatment Diagnosis: Debility and decreased ADLs due to chest pain, Parkinsons recent NSTEMI. Restrictions  Restrictions/Precautions  Restrictions/Precautions: Fall Risk(High fall risk)  Required Braces or Orthoses?: No  Position Activity Restriction  Other position/activity restrictions: 60-year-old gentleman with history of CAD status post PCI 1/8/2021 for NSTEMI with JAMES x2 to RCA is a direct admit from outside hospital with complaint of chest pain. Troponin is mildly elevated and stable. EKG is nonspecific and unchanged from prior. Cardiology evaluation complete and patient is cleared to go home from cardiology standpoint.   Discharge was planned however patient spouse raising the issue of difficulty assisting patient at home and requested PT OT evaluation for possible SNF versus home care. Subjective   General  Chart Reviewed: Yes  Family / Caregiver Present: No  Diagnosis: Chest pain  Subjective  Subjective: Patient sleeping soundly, periods of confusion. Patient stating several times stating \"I was so confused, I thought my wife and dtr were dying on the side of the road. \" Reoriented several times  Patient Currently in Pain: Denies  Pain Assessment  Pain Assessment: 0-10  Pain Level: 0  Patient Currently in Pain: Denies  Oxygen Therapy  SpO2: 97 %  Pulse Oximeter Device Mode: Intermittent  Pulse Oximeter Device Location: Finger  O2 Device: None (Room air)  Social/Functional History  Social/Functional History  Lives With: Family(spouse, daughter and grandson)  Type of Home: House  Home Layout: Two level, Bed/Bath upstairs(states kitchen is upstairs and he sleeps in lower level, enters through basement)  Home Access: Level entry  Bathroom Shower/Tub: Walk-in shower  Bathroom Toilet: Standard  Bathroom Equipment: Grab bars in shower  Bathroom Accessibility: Walker accessible  Home Equipment: Cane  ADL Assistance: Needs assistance(states spouse assists PRN (drying off after showers))  Homemaking Responsibilities: No(spouse/daughter performs)  Ambulation Assistance: Independent(states he holds onto furniture at home, PRN use of SPC)  Transfer Assistance: Independent  Active : No  Patient's  Info: spouse/daughter provide transportation  Occupation: Retired  Type of occupation: Retired confstruction  Additional Comments: states approved for scootor and RW but has not been delievered, states cardiologist told him he needed a scooter, states 1 recent fall       Objective        Orientation  Overall Orientation Status: Impaired  Orientation Level: Oriented to time;Disoriented to place;Oriented to place; Disoriented to situation     Balance  Sitting Balance: Supervision  Standing Balance  Time: @ 1-3 minutes x 3  Activity: CGA/min with RW but difficulty turning, min assist without RW. Patient use to holding onto walls and furniture at home  Comment: Ambulation @ 20 feet in room, easily fatigued. Sat in chair x 5 minutes then ambulated to bathroom and then back to bed  Functional Mobility  Functional - Mobility Device: Rolling Walker  Activity: To/from bathroom  Assist Level: Minimal assistance  Toilet Transfers  Toilet - Technique: Ambulating  Equipment Used: Standard toilet  Toilet Transfer: Contact guard assistance  ADL  Feeding: Setup(Patient stated he is suppose to be on thickened liquids but refuses to do (rec SLP order))  Grooming: Setup  LE Dressing: Moderate assistance(Setup to don/doff socks EOB, max assist pants)  Toileting: Moderate assistance  Tone RUE  RUE Tone: Normotonic  Tone LUE  LUE Tone: Normotonic  Coordination  Movements Are Fluid And Coordinated: No  Coordination and Movement description: Fine motor impairments;Right UE;Tremors; Resting tremors  Quality of Movement Other  Comment: Parkinsons tremors especially in right hand     Bed mobility  Supine to Sit: Minimal assistance(Simultaneous filing. User may not have seen previous data.)  Sit to Supine: Stand by assistance(Simultaneous filing. User may not have seen previous data.)     Vision - Basic Assessment  Prior Vision: No visual deficits  Patient Visual Report: No visual complaint reported. Cognition  Overall Cognitive Status: Exceptions  Arousal/Alertness: Appropriate responses to stimuli  Following Commands: Follows one step commands with repetition  Attention Span: Appears intact  Memory: Decreased recall of recent events;Decreased short term memory  Safety Judgement: Decreased awareness of need for safety  Insights: Decreased awareness of deficits  Sequencing: Requires cues for some  Cognition Comment: Chart reports periods of confusion. Patient stated several times that he was confused over the night and thinking he was at home and that his wife/dtr  in a car accident. Reoriented multiple times. Decreased awareness of recent events  Perception  Overall Perceptual Status: WFL     Sensation  Overall Sensation Status: WFL        LUE AROM (degrees)  LUE AROM : WFL  LUE General AROM: @ 0-90 degrees shoulder flexion  Left Hand AROM (degrees)  Left Hand AROM: WFL  Left Hand General AROM: @ 0-90 degrees shoulder flexion  LUE Strength  Gross LUE Strength: WFL  RUE Strength  Gross RUE Strength: WFL                   Plan   Plan  Times per week: 3-5  Current Treatment Recommendations: Functional Mobility Training, Safety Education & Training, Self-Care / ADL, Patient/Caregiver Education & Training, Endurance Training, Strengthening, Equipment Evaluation, Education, & procurement  Plan Comment: Patient is in observation status.     G-Code     OutComes Score                                                  AM-PAC Score        AM-PAC Inpatient Daily Activity Raw Score: 15 (01/14/21 1132)  AM-PAC Inpatient ADL T-Scale Score : 34.69 (01/14/21 1132)  ADL Inpatient CMS 0-100% Score: 56.46 (01/14/21 1132)  ADL Inpatient CMS G-Code Modifier : CK (01/14/21 1132)    Goals  Short term goals  Time Frame for Short term goals: Until discharge  Short term goal 1: Min assist bathing  Short term goal 2: Min assist dressing  Short term goal 3: Supervision functional mobility  Short term goal 4: Supervision functional transfers  Short term goal 5: Supervision toileting  Long term goals  Time Frame for Long term goals : STGs= LTGs  Patient Goals   Patient goals : Go home with wife       Therapy Time   Individual Concurrent Group Co-treatment   Time In 1012         Time Out 1120         Minutes 68              Timed Code Treatment Minutes:  23 Minutes    Total Treatment Minutes:  68 minutes total (charges split due to observation status)    Severiano Generous, OTR/L [phone]

## 2021-01-14 NOTE — PROGRESS NOTES
Speech Language Pathology  CLINICAL BEDSIDE SWALLOWING EVALUATION  Speech Therapy Department    Patient Name:  Lillian Temple  :  1940  Pain level:Pt denies pain at this time  Medical Diagnosis:   Chest pain [R07.9]     HPI:79year-old gentleman with history of CAD status post PCI 2021 for NSTEMI with JAMES x2 to RCA is a direct admit from outside hospital with complaint of chest pain. Troponin is mildly elevated and stable. EKG is nonspecific and unchanged from prior. Cardiology evaluation complete and patient is cleared to go home from cardiology standpoint. Discharge was planned however patient spouse raising the issue of difficulty assisting patient at home and requested PT OT evaluation for possible SNF versus home care. Speech Therapy/Clinical Swallow Evaluation order received. Per chart review, the Pt has a history of dysphagia with remote Outpatient MBS completed at this facility on 17 with the following results:  Modified Barium Swallow evaluation completed on 2017. Results indicate moderate-severe oropharyngeal dysphagia characterized by prolonged mastication, reduced A-P propulsion, premature bolus loss to pharynx, delayed swallow initiation, reduced hyolaryngeal excursion, reduced/absent epiglottic movement, reduced pharyngeal clearing, and GROSS aspiration of thin liquids and nectar thick liquids. Thin liquids via cup revealed rapid bolus loss to pyriforms, largely ABSENT epiglottic distension resulting in significantly reduced airway protection with direct entry into airway. A cough reflex was elicited with gross aspiration episode. Nectar thick liquids via cup revealed use of multiple swallows with one instance of aspiration during the swallow. With use of chin tuck strategy, mild laryngeal penetration was noted with nectar thick liquids. No aspiration or penetration was viewed with honey thick liquids, however vallecular residue was observed post swallow. Reduced pharyngeal clearing was observed across all textures, suspected due to significantly reduced epiglottic distension and epiglottis making contact with post-pharyngeal wall. Given verbal cues to use chin tuck with second dry swallow, this appeared to assist with pharyngeal clearing however did not eliminate residue. Solid textures revealed prolonged mastication with piecemeal swallows, again reduced pharyngeal clearing was observed with coating noted on post-pharyngeal wall. At this time Pt is at HIGH risk of aspiration based on significantly reduced epiglottic movement, decreased airway protection and reduced pharyngeal clearing, therefore strict aspiration precautions should be followed. Per care everywhere review, Pt more recently seen at [facility] for a follow up visits for Parkinson's Disease on 4/2/20. Although follow up speech therapy for dysphagia treatment as well as repeat MBS due to progressive dysphagia were recommended per  physician, notes indicate that the Pt declined these recommendations at that time. Currently the Pt is awake and verbally responsive. Pt does report a history of dysphagia and also acknowledges noncompliance with diet texture and SLP treatment recommendations. Pt does report increased coughing with po at meals. Pt's wife is present and reports that the Pt has had recent episodes of \"choking so bad we almost called 911. \"    Treatment Diagnosis:  Moderate Oropharyngeal Dysphagia    Impressions:Pt demonstrates moderately reduced oral motor strength, ROM and coordination impacting bolus control and A-P propulsion with all textures. Clinical symptoms of premature bolus loss to pharynx, pharyngeal pooling with delayed swallow initiation, reduced airway protection during the swallow and suspected delayed/reduced pharyngeal clearing after the swallow with all textures. Clinical symptoms of direct aspiration with delayed coughing noted with thin liquids.  Improved initial tolerance of nectar thick liquids noted but delayed throat clearing consistent with penetration/aspiration of pharyngeal residue also noted with nectar thick liquids. Pt tolerated honey thick liquids, purees and soft solids with no overt signs of aspiration, but reduced pharyngeal clearing after the swallow is suspected with all textures. Therefore an MBS is indicated to further assess extent of oropharyngeal dysphagia and aspiration risk. Lengthy discussion held with Pt regarding aspiration and dysphagia history as well as current aspiration risk and precautions. Pt reluctant but agreeable to diet texture and MBS recommendations at this time. Dietary Recommendations:  1) Dysphagia III Soft and Bite-Sized with Honey thick (moderately thick) liquids/no straws/meds with applesauce, pending MBS results    Strategies: 90 degree positioning with all p.o. intake; small bites/sips; alternate textures through meal; reduce rate of intake    Treatment/Goals: Speech therapy for dysphagia tx 3-5 times per week. ST.) Pt will tolerate recommended diet without s/s of aspiration   2.) Pt will tolerate MBS to r/o aspiration and determine appropriate diet/liquid level, with further diet and treatment recommendations as indicated.      Oral motor Exam:  Dentition: edentulouw  Labial/Facial: Moderately reduced mouth closure for po acceptance  Lingual:reduced coordination for alternating movements  Voice: weak; reduced volume    Oral Phase:   Reduced mastication  Reduced A-P propulsion  Apparent premature bolus loss to pharynx  Uncleared oral stasis with solids    Pharyngeal Phase:  Apparent pharyngeal pooling  Delayed swallow initiation  Decreased laryngeal elevation via palpation   Apparent decreased pharyngeal clearing with multiple successive swallows to clear boluses through pharynx  Change in vocal quality with thins and nectars  Coughing (delayed) with thin liquids  Throat clearing (delayed) with nectar thick liquids    Patient/Family Education:Education, results and recommendations given to the Pt and nurse, who verbalized understanding    Timed Code Treatment:0 min    Total Treatment Time:40 min    Discharge Recommendations: Speech Therapy for Speech/Dysphagia treatment at discharge. If patient discharges prior to next session this note will serve as a discharge summary.       Argentina Waldrop [id number]

## 2021-01-14 NOTE — PROGRESS NOTES
Hospitalist Progress Note      PCP: Jessica Lopez MD    Date of Admission: 1/11/2021    Chief Complaint: Chest pain    Hospital Course: 69-year-old gentleman with history of CAD status post PCI 1/8/2021 for NSTEMI with JAMES x2 to RCA is a direct admit from outside hospital with complaint of chest pain. Troponin is mildly elevated and stable. EKG is nonspecific and unchanged from prior. Cardiology evaluation complete and patient is cleared to go home from cardiology standpoint. Discharge was planned however patient spouse raising the issue of difficulty assisting patient at home and requested PT OT evaluation for possible SNF versus home care. Subjective: Patient seen and examined. Currently chest pain-free. No shortness of breath, palpitations, dizziness.        Medications:  Reviewed    Infusion Medications   Scheduled Medications    ciprofloxacin  500 mg Oral 2 times per day    lisinopril  10 mg Oral Daily    iron sucrose (VENOFER) iv piggyback 100 mL (Admin over 60 minutes)  200 mg Intravenous Q24H    atorvastatin  20 mg Oral Nightly    carbidopa-levodopa  1 tablet Oral TID    fludrocortisone  0.2 mg Oral Daily    clopidogrel  75 mg Oral Daily    famotidine  20 mg Oral BID    carvedilol  12.5 mg Oral BID WC    sodium chloride flush  10 mL Intravenous 2 times per day    aspirin  81 mg Oral Daily    enoxaparin  40 mg Subcutaneous Daily     PRN Meds: sodium chloride flush, promethazine **OR** ondansetron, acetaminophen **OR** acetaminophen, polyethylene glycol, nitroGLYCERIN      Intake/Output Summary (Last 24 hours) at 1/14/2021 1548  Last data filed at 1/13/2021 1957  Gross per 24 hour   Intake 10 ml   Output --   Net 10 ml       Physical Exam Performed:    /81   Pulse 80   Temp 98.2 °F (36.8 °C) (Axillary)   Resp 16   Ht 5' 10\" (1.778 m)   Wt 121 lb 7.6 oz (55.1 kg)   SpO2 96%   BMI 17.43 kg/m²     Physical Exam  Constitutional:       General: He is not in acute distress. Appearance: He is normal weight. He is not toxic-appearing. HENT:      Head: Normocephalic and atraumatic. Nose: Nose normal.      Mouth/Throat:      Mouth: Mucous membranes are moist.   Eyes:      General: No scleral icterus. Conjunctiva/sclera: Conjunctivae normal.      Pupils: Pupils are equal, round, and reactive to light. Neck:      Musculoskeletal: Neck supple. No muscular tenderness. Cardiovascular:      Rate and Rhythm: Normal rate and regular rhythm. Pulses: Normal pulses. Heart sounds: Normal heart sounds. Pulmonary:      Effort: Pulmonary effort is normal. No respiratory distress. Breath sounds: Normal breath sounds. Abdominal:      General: Abdomen is flat. Bowel sounds are normal.      Palpations: Abdomen is soft. Musculoskeletal:         General: No tenderness. Right lower leg: No edema. Left lower leg: No edema. Skin:     General: Skin is warm and dry. Coloration: Skin is not jaundiced or pale. Neurological:      General: No focal deficit present. Mental Status: He is alert and oriented to person, place, and time. Mental status is at baseline. Psychiatric:         Mood and Affect: Mood normal.         Thought Content: Thought content normal.         Judgment: Judgment normal.         ... Labs:   Recent Labs     01/12/21  0439 01/13/21  0509 01/14/21  0434   WBC 7.4 8.1 8.1   HGB 9.8* 9.6* 9.6*   HCT 29.9* 29.2* 29.5*    151 153     Recent Labs     01/12/21  0439      K 3.5      CO2 28   BUN 25*   CREATININE 1.1   CALCIUM 9.2     Recent Labs     01/12/21  0439   AST 13*   ALT 7*   BILIDIR <0.2   BILITOT 0.4   ALKPHOS 90     No results for input(s): INR in the last 72 hours.   Recent Labs     01/11/21  2212 01/12/21  0033   TROPONINI 0.03* 0.03*       Urinalysis:      Lab Results   Component Value Date    NITRU Negative 01/12/2021    WBCUA 21 01/12/2021    BACTERIA 4+ 01/12/2021    RBCUA 5 01/12/2021 BLOODU Negative 01/12/2021    SPECGRAV 1.023 01/12/2021    GLUCOSEU Negative 01/12/2021       Radiology:  Fluoroscopy modified barium swallow with video    (Results Pending)           Assessment/Plan:    Active Hospital Problems    Diagnosis    Coronary artery disease [I25.10]     Priority: High    Hypertension [I10]     Priority: High    Chest pain [R07.9]    Paroxysmal atrial fibrillation (HCC) [I48.0]    Hyperlipidemia [E78.5]     Chest pain  Typical  Significant prior history  Evaluated by cardiology and cleared for discharge    Essential hypertension  Better controlled now  We will increase Coreg from 6 75 to 12.5  We will further increase lisinopril    Confusion  Intermittent  Not surprising in this patient with parkinsonism, dementia in the hospital setting  Attempt to frequently reorient patient    History of orthostatic hypotension  We will hold midodrine for now    History of CAD  As above    History of COPD  Not exacerbation    Normocytic anemia  GALDINO  Hemoglobin stable  We will give IV Venofer while inpatient    Parkinson's disease  Continue with levodopa carbidopa    DVT Prophylaxis: Lovenox  Diet: DIET DYSPHAGIA SOFT AND BITE-SIZED; Moderately Thick (Honey);  No Caffeine  Code Status: Full Code    Electronically signed by Jimmy Bloom MD on 1/14/2021 at 3:48 PM

## 2021-01-14 NOTE — PROGRESS NOTES
Patient resting after one time dose of Ativan. Midnight vitals delayed in order for patient to continue resting. Patient with even and unlabored respirations.

## 2021-01-14 NOTE — PROGRESS NOTES
Bed alarm sounding. Patient out of bed almost to window leaning against the wall. Patient states he is going home. Oriented patient once again to where he was at. Returned to bed. Alarm activated. Camera still in room. Hospitalist notified to see if anything PRN wanted to be given to patient tonight.

## 2021-01-14 NOTE — CARE COORDINATION
Per therapy recommendation, asked Delvin Quinonez to review the patient for possible ARU eligibility.

## 2021-01-15 ENCOUNTER — HOSPITAL ENCOUNTER (INPATIENT)
Age: 81
LOS: 18 days | Discharge: HOME HEALTH | DRG: 948 | End: 2021-02-02
Attending: PHYSICAL MEDICINE & REHABILITATION | Admitting: PHYSICAL MEDICINE & REHABILITATION
Payer: COMMERCIAL

## 2021-01-15 ENCOUNTER — APPOINTMENT (OUTPATIENT)
Dept: GENERAL RADIOLOGY | Age: 81
End: 2021-01-15
Attending: FAMILY MEDICINE
Payer: COMMERCIAL

## 2021-01-15 VITALS
HEART RATE: 67 BPM | BODY MASS INDEX: 17.39 KG/M2 | HEIGHT: 70 IN | TEMPERATURE: 97.4 F | DIASTOLIC BLOOD PRESSURE: 65 MMHG | RESPIRATION RATE: 16 BRPM | SYSTOLIC BLOOD PRESSURE: 124 MMHG | OXYGEN SATURATION: 99 % | WEIGHT: 121.47 LBS

## 2021-01-15 PROBLEM — R53.81 DEBILITY: Status: ACTIVE | Noted: 2021-01-15

## 2021-01-15 LAB
HCT VFR BLD CALC: 29.8 % (ref 40.5–52.5)
HEMOGLOBIN: 9.8 G/DL (ref 13.5–17.5)
MCH RBC QN AUTO: 29.9 PG (ref 26–34)
MCHC RBC AUTO-ENTMCNC: 33 G/DL (ref 31–36)
MCV RBC AUTO: 90.5 FL (ref 80–100)
PDW BLD-RTO: 14.7 % (ref 12.4–15.4)
PLATELET # BLD: 154 K/UL (ref 135–450)
PMV BLD AUTO: 7.9 FL (ref 5–10.5)
RBC # BLD: 3.3 M/UL (ref 4.2–5.9)
WBC # BLD: 7.3 K/UL (ref 4–11)

## 2021-01-15 PROCEDURE — 92611 MOTION FLUOROSCOPY/SWALLOW: CPT

## 2021-01-15 PROCEDURE — 92526 ORAL FUNCTION THERAPY: CPT

## 2021-01-15 PROCEDURE — 96366 THER/PROPH/DIAG IV INF ADDON: CPT

## 2021-01-15 PROCEDURE — 6370000000 HC RX 637 (ALT 250 FOR IP)

## 2021-01-15 PROCEDURE — 2580000003 HC RX 258: Performed by: INTERNAL MEDICINE

## 2021-01-15 PROCEDURE — 6370000000 HC RX 637 (ALT 250 FOR IP): Performed by: INTERNAL MEDICINE

## 2021-01-15 PROCEDURE — G0378 HOSPITAL OBSERVATION PER HR: HCPCS

## 2021-01-15 PROCEDURE — 36415 COLL VENOUS BLD VENIPUNCTURE: CPT

## 2021-01-15 PROCEDURE — 85027 COMPLETE CBC AUTOMATED: CPT

## 2021-01-15 PROCEDURE — 1280000000 HC REHAB R&B

## 2021-01-15 PROCEDURE — 74230 X-RAY XM SWLNG FUNCJ C+: CPT

## 2021-01-15 PROCEDURE — 6360000002 HC RX W HCPCS: Performed by: INTERNAL MEDICINE

## 2021-01-15 PROCEDURE — 96372 THER/PROPH/DIAG INJ SC/IM: CPT

## 2021-01-15 PROCEDURE — 97530 THERAPEUTIC ACTIVITIES: CPT

## 2021-01-15 RX ORDER — SODIUM CHLORIDE 0.9 % (FLUSH) 0.9 %
10 SYRINGE (ML) INJECTION PRN
Status: DISCONTINUED | OUTPATIENT
Start: 2021-01-15 | End: 2021-02-02 | Stop reason: HOSPADM

## 2021-01-15 RX ORDER — POLYETHYLENE GLYCOL 3350 17 G/17G
17 POWDER, FOR SOLUTION ORAL DAILY PRN
Status: DISCONTINUED | OUTPATIENT
Start: 2021-01-15 | End: 2021-02-02 | Stop reason: HOSPADM

## 2021-01-15 RX ORDER — ACETAMINOPHEN 325 MG/1
650 TABLET ORAL EVERY 4 HOURS PRN
Status: CANCELLED | OUTPATIENT
Start: 2021-01-15

## 2021-01-15 RX ORDER — ATORVASTATIN CALCIUM 20 MG/1
20 TABLET, FILM COATED ORAL NIGHTLY
Status: CANCELLED | OUTPATIENT
Start: 2021-01-15

## 2021-01-15 RX ORDER — CARBIDOPA AND LEVODOPA 25; 100 MG/1; MG/1
1 TABLET, ORALLY DISINTEGRATING ORAL 3 TIMES DAILY
Status: DISCONTINUED | OUTPATIENT
Start: 2021-01-15 | End: 2021-01-27

## 2021-01-15 RX ORDER — TRAZODONE HYDROCHLORIDE 50 MG/1
50 TABLET ORAL NIGHTLY PRN
Status: CANCELLED | OUTPATIENT
Start: 2021-01-15

## 2021-01-15 RX ORDER — CARBIDOPA AND LEVODOPA 25; 100 MG/1; MG/1
1 TABLET, ORALLY DISINTEGRATING ORAL 3 TIMES DAILY
Status: CANCELLED | OUTPATIENT
Start: 2021-01-15

## 2021-01-15 RX ORDER — FAMOTIDINE 20 MG/1
20 TABLET, FILM COATED ORAL 2 TIMES DAILY
Status: CANCELLED | OUTPATIENT
Start: 2021-01-15

## 2021-01-15 RX ORDER — CARVEDILOL 6.25 MG/1
6.25 TABLET ORAL 2 TIMES DAILY WITH MEALS
Qty: 60 TABLET | Refills: 3 | Status: ON HOLD | OUTPATIENT
Start: 2021-01-15 | End: 2021-02-02 | Stop reason: HOSPADM

## 2021-01-15 RX ORDER — FLUDROCORTISONE ACETATE 0.1 MG/1
0.2 TABLET ORAL DAILY
Status: DISCONTINUED | OUTPATIENT
Start: 2021-01-16 | End: 2021-02-02 | Stop reason: HOSPADM

## 2021-01-15 RX ORDER — ONDANSETRON 4 MG/1
4 TABLET, ORALLY DISINTEGRATING ORAL EVERY 8 HOURS PRN
Status: DISCONTINUED | OUTPATIENT
Start: 2021-01-15 | End: 2021-02-02 | Stop reason: HOSPADM

## 2021-01-15 RX ORDER — ACETAMINOPHEN 325 MG/1
650 TABLET ORAL EVERY 4 HOURS PRN
Status: DISCONTINUED | OUTPATIENT
Start: 2021-01-15 | End: 2021-02-02 | Stop reason: HOSPADM

## 2021-01-15 RX ORDER — MIDODRINE HYDROCHLORIDE 5 MG/1
2.5 TABLET ORAL
Status: CANCELLED | OUTPATIENT
Start: 2021-01-16

## 2021-01-15 RX ORDER — CLOPIDOGREL BISULFATE 75 MG/1
75 TABLET ORAL DAILY
Status: CANCELLED | OUTPATIENT
Start: 2021-01-16

## 2021-01-15 RX ORDER — CARVEDILOL 6.25 MG/1
6.25 TABLET ORAL 2 TIMES DAILY WITH MEALS
Status: DISCONTINUED | OUTPATIENT
Start: 2021-01-15 | End: 2021-01-15 | Stop reason: HOSPADM

## 2021-01-15 RX ORDER — DIPHENHYDRAMINE HCL 25 MG
25 TABLET ORAL EVERY 6 HOURS PRN
Status: CANCELLED | OUTPATIENT
Start: 2021-01-15

## 2021-01-15 RX ORDER — CLOPIDOGREL BISULFATE 75 MG/1
75 TABLET ORAL DAILY
Status: DISCONTINUED | OUTPATIENT
Start: 2021-01-16 | End: 2021-02-02 | Stop reason: HOSPADM

## 2021-01-15 RX ORDER — SODIUM CHLORIDE 0.9 % (FLUSH) 0.9 %
10 SYRINGE (ML) INJECTION PRN
Status: CANCELLED | OUTPATIENT
Start: 2021-01-15

## 2021-01-15 RX ORDER — NITROGLYCERIN 0.4 MG/1
0.4 TABLET SUBLINGUAL EVERY 5 MIN PRN
Status: CANCELLED | OUTPATIENT
Start: 2021-01-15

## 2021-01-15 RX ORDER — POLYETHYLENE GLYCOL 3350 17 G/17G
17 POWDER, FOR SOLUTION ORAL DAILY PRN
Status: CANCELLED | OUTPATIENT
Start: 2021-01-15

## 2021-01-15 RX ORDER — ONDANSETRON 4 MG/1
4 TABLET, ORALLY DISINTEGRATING ORAL EVERY 8 HOURS PRN
Status: CANCELLED | OUTPATIENT
Start: 2021-01-15

## 2021-01-15 RX ORDER — HYDRALAZINE HYDROCHLORIDE 25 MG/1
50 TABLET, FILM COATED ORAL EVERY 8 HOURS PRN
Status: CANCELLED | OUTPATIENT
Start: 2021-01-15

## 2021-01-15 RX ORDER — MIDODRINE HYDROCHLORIDE 5 MG/1
2.5 TABLET ORAL
Status: DISCONTINUED | OUTPATIENT
Start: 2021-01-16 | End: 2021-01-18

## 2021-01-15 RX ORDER — MIDODRINE HYDROCHLORIDE 5 MG/1
2.5 TABLET ORAL
Status: DISCONTINUED | OUTPATIENT
Start: 2021-01-15 | End: 2021-01-15 | Stop reason: HOSPADM

## 2021-01-15 RX ORDER — HYDRALAZINE HYDROCHLORIDE 25 MG/1
50 TABLET, FILM COATED ORAL EVERY 8 HOURS PRN
Status: DISCONTINUED | OUTPATIENT
Start: 2021-01-15 | End: 2021-01-19

## 2021-01-15 RX ORDER — CIPROFLOXACIN 500 MG/1
500 TABLET, FILM COATED ORAL EVERY 12 HOURS SCHEDULED
Status: COMPLETED | OUTPATIENT
Start: 2021-01-15 | End: 2021-01-19

## 2021-01-15 RX ORDER — TRAMADOL HYDROCHLORIDE 50 MG/1
50 TABLET ORAL EVERY 6 HOURS PRN
Status: CANCELLED | OUTPATIENT
Start: 2021-01-15

## 2021-01-15 RX ORDER — TRAMADOL HYDROCHLORIDE 50 MG/1
50 TABLET ORAL EVERY 6 HOURS PRN
Status: DISCONTINUED | OUTPATIENT
Start: 2021-01-15 | End: 2021-02-02 | Stop reason: HOSPADM

## 2021-01-15 RX ORDER — SODIUM CHLORIDE 0.9 % (FLUSH) 0.9 %
10 SYRINGE (ML) INJECTION EVERY 12 HOURS SCHEDULED
Status: CANCELLED | OUTPATIENT
Start: 2021-01-15

## 2021-01-15 RX ORDER — FAMOTIDINE 20 MG/1
20 TABLET, FILM COATED ORAL 2 TIMES DAILY
Status: DISCONTINUED | OUTPATIENT
Start: 2021-01-15 | End: 2021-02-02 | Stop reason: HOSPADM

## 2021-01-15 RX ORDER — FLUDROCORTISONE ACETATE 0.1 MG/1
0.2 TABLET ORAL DAILY
Status: CANCELLED | OUTPATIENT
Start: 2021-01-16

## 2021-01-15 RX ORDER — ATORVASTATIN CALCIUM 20 MG/1
20 TABLET, FILM COATED ORAL NIGHTLY
Status: DISCONTINUED | OUTPATIENT
Start: 2021-01-15 | End: 2021-02-02 | Stop reason: HOSPADM

## 2021-01-15 RX ORDER — CARVEDILOL 6.25 MG/1
6.25 TABLET ORAL 2 TIMES DAILY WITH MEALS
Status: DISCONTINUED | OUTPATIENT
Start: 2021-01-16 | End: 2021-01-19

## 2021-01-15 RX ORDER — ASPIRIN 81 MG/1
81 TABLET, CHEWABLE ORAL DAILY
Status: DISCONTINUED | OUTPATIENT
Start: 2021-01-16 | End: 2021-02-02 | Stop reason: HOSPADM

## 2021-01-15 RX ORDER — TRAZODONE HYDROCHLORIDE 50 MG/1
50 TABLET ORAL NIGHTLY PRN
Status: DISCONTINUED | OUTPATIENT
Start: 2021-01-15 | End: 2021-02-02 | Stop reason: HOSPADM

## 2021-01-15 RX ORDER — MIDODRINE HYDROCHLORIDE 5 MG/1
TABLET ORAL
Status: COMPLETED
Start: 2021-01-15 | End: 2021-01-15

## 2021-01-15 RX ORDER — LISINOPRIL 10 MG/1
10 TABLET ORAL DAILY
Qty: 30 TABLET | Refills: 3 | Status: SHIPPED | OUTPATIENT
Start: 2021-01-15 | End: 2021-01-15 | Stop reason: HOSPADM

## 2021-01-15 RX ORDER — MIDODRINE HYDROCHLORIDE 2.5 MG/1
2.5 TABLET ORAL
Qty: 90 TABLET | Refills: 3 | Status: ON HOLD | OUTPATIENT
Start: 2021-01-15 | End: 2021-02-02 | Stop reason: HOSPADM

## 2021-01-15 RX ORDER — SODIUM CHLORIDE 0.9 % (FLUSH) 0.9 %
10 SYRINGE (ML) INJECTION EVERY 12 HOURS SCHEDULED
Status: DISCONTINUED | OUTPATIENT
Start: 2021-01-15 | End: 2021-01-17

## 2021-01-15 RX ORDER — CIPROFLOXACIN 500 MG/1
500 TABLET, FILM COATED ORAL EVERY 12 HOURS SCHEDULED
Status: CANCELLED | OUTPATIENT
Start: 2021-01-15 | End: 2021-01-20

## 2021-01-15 RX ORDER — NITROGLYCERIN 0.4 MG/1
0.4 TABLET SUBLINGUAL EVERY 5 MIN PRN
Status: DISCONTINUED | OUTPATIENT
Start: 2021-01-15 | End: 2021-02-02 | Stop reason: HOSPADM

## 2021-01-15 RX ORDER — ASPIRIN 81 MG/1
81 TABLET, CHEWABLE ORAL DAILY
Status: CANCELLED | OUTPATIENT
Start: 2021-01-16

## 2021-01-15 RX ORDER — CARVEDILOL 6.25 MG/1
6.25 TABLET ORAL 2 TIMES DAILY WITH MEALS
Status: CANCELLED | OUTPATIENT
Start: 2021-01-15

## 2021-01-15 RX ORDER — DIPHENHYDRAMINE HCL 25 MG
25 TABLET ORAL EVERY 6 HOURS PRN
Status: DISCONTINUED | OUTPATIENT
Start: 2021-01-15 | End: 2021-02-02 | Stop reason: HOSPADM

## 2021-01-15 RX ADMIN — CIPROFLOXACIN 500 MG: 500 TABLET, FILM COATED ORAL at 11:19

## 2021-01-15 RX ADMIN — FAMOTIDINE 20 MG: 20 TABLET, FILM COATED ORAL at 11:20

## 2021-01-15 RX ADMIN — CARBIDOPA AND LEVODOPA 1 TABLET: 25; 100 TABLET, ORALLY DISINTEGRATING ORAL at 14:41

## 2021-01-15 RX ADMIN — LISINOPRIL 10 MG: 10 TABLET ORAL at 11:19

## 2021-01-15 RX ADMIN — ENOXAPARIN SODIUM 40 MG: 40 INJECTION SUBCUTANEOUS at 11:18

## 2021-01-15 RX ADMIN — CLOPIDOGREL BISULFATE 75 MG: 75 TABLET ORAL at 11:19

## 2021-01-15 RX ADMIN — FLUDROCORTISONE ACETATE 0.2 MG: 0.1 TABLET ORAL at 11:20

## 2021-01-15 RX ADMIN — ASPIRIN 81 MG: 81 TABLET, CHEWABLE ORAL at 11:20

## 2021-01-15 RX ADMIN — IRON SUCROSE 200 MG: 20 INJECTION, SOLUTION INTRAVENOUS at 14:42

## 2021-01-15 RX ADMIN — MIDODRINE HYDROCHLORIDE 5 MG: 5 TABLET ORAL at 14:28

## 2021-01-15 RX ADMIN — CARBIDOPA AND LEVODOPA 1 TABLET: 25; 100 TABLET, ORALLY DISINTEGRATING ORAL at 11:20

## 2021-01-15 RX ADMIN — Medication 10 ML: at 11:19

## 2021-01-15 RX ADMIN — CARVEDILOL 12.5 MG: 6.25 TABLET, FILM COATED ORAL at 11:19

## 2021-01-15 RX ADMIN — CARVEDILOL 6.25 MG: 6.25 TABLET, FILM COATED ORAL at 18:00

## 2021-01-15 ASSESSMENT — PAIN SCALES - GENERAL: PAINLEVEL_OUTOF10: 0

## 2021-01-15 NOTE — CONSULTS
Patient: Kurt Naranjo  [id number]  Date: 1/15/2021      Chief Complaint: Weakness    History of Present Illness/Hospital Course:  50-year-old male with a history of COPD, Parkinson, prostate cancer, HTN, HLD, and CAD status post recent PCI and discharged on 1/9. He was readmitted on 1/11 with chest pain. Repeat cardiac work-up was performed and cardiology suggested no further intervention at this time. He was evaluated by therapy and suggested to continue in an inpatient setting prior to returning home. He reports feeling diffusely weak and feeling like his hospitalizations have led to increased muscle wasting. He also reports chronic right shoulder pain and difficulty raising his right arm. Prior Level of Function:  Modified independent for ambulation, assistance with ADLs    Current Level of Function:  Chandra CORREIA     has a past medical history of CAD (coronary artery disease), COPD (chronic obstructive pulmonary disease) ([facility] [address] Coronary artery bypass, Coronary stent, Hyperlipidemia, Hypertension, Osteoarthritis, Parkinson disease ([facility] [address] Prostate cancer ([facility] [address] and Rotator cuff syndrome. has a past surgical history that includes Prostatectomy (2004); Colonoscopy (2005); Coronary artery bypass graft; Cardiac surgery (1998); Coronary angioplasty (1993); Coronary angioplasty (2013); Cataract removal with implant (2015); sigmoidoscopy (N/A, 1/25/2019); and Colonoscopy (N/A, 8/30/2019). reports that he quit smoking about 37 years ago. He has a 60.00 pack-year smoking history. He has never used smokeless tobacco. He reports that he does not drink alcohol or use drugs. family history includes Cancer in his mother and sister; Colon Cancer in his mother and sister. REVIEW OF SYSTEMS:   CONSTITUTIONAL: negative for fevers, chills, diaphoresis, appetite change, night sweats and unexpected weight change.    HEENT: negative for hearing loss, tinnitus, ear drainage, sinus pressure, nasal congestion, epistaxis and snoring. RESPIRATORY: Negative for hemoptysis, cough, sputum production. CARDIOVASCULAR: negative for chest pain, palpitations, exertional chest pressure/discomfort, edema, syncope. GASTROINTESTINAL: negative for nausea, vomiting, diarrhea, constipation, blood in stool and abdominal pain. GENITOURINARY: negative for frequency, dysuria, urinary incontinence, decreased urine volume, and hematuria. HEMATOLOGIC/LYMPHATIC: negative for easy bruising, bleeding and lymphadenopathy. ALLERGIC/IMMUNOLOGIC: negative for recurrent infections, angioedema, anaphylaxis and drug reactions. ENDOCRINE: negative for weight changes and diabetic symptoms including polyuria, polydipsia and polyphagia. MUSCULOSKELETAL: positive for pain, joint swelling, decreased range of motion and muscle weakness. NEUROLOGICAL: negative for headaches, slurred speech, unilateral weakness. PSYCHIATRIC/BEHAVIORAL: negative for hallucinations, behavioral problems, confusion and agitation. All pertinent positives are noted in the HPI. Physical Examination:  Vitals:   Patient Vitals for the past 24 hrs:   BP Temp Temp src Pulse Resp SpO2 Weight   01/15/21 1445 103/63 -- -- -- -- -- --   01/15/21 1434 (!) 99/59 -- -- -- -- -- --   01/15/21 1430 (!) 75/47 -- -- 69 -- -- --   01/15/21 1428 (!) 65/41 -- -- 69 -- -- --   01/15/21 1420 (!) 58/39 -- -- -- -- -- --   01/15/21 1415 (!) 60/40 -- -- -- -- -- --   01/15/21 1115 138/85 98.3 °F (36.8 °C) Oral 77 16 98 % --   01/15/21 1000 128/79 97.6 °F (36.4 °C) Oral 77 16 98 % --   01/15/21 0759 -- -- -- -- -- -- 121 lb 7.6 oz (55.1 kg)   01/15/21 0045 (!) 150/83 97.4 °F (36.3 °C) Oral 73 16 96 % --   01/14/21 2030 (!) 148/82 98.1 °F (36.7 °C) Oral 82 18 97 % --   01/14/21 1840 117/70 97.4 °F (36.3 °C) Oral 77 16 97 % --     Psych: Stable mood, normal judgement, normal affect. Const: No distress, thin and wasted  Eyes: Conjunctiva noninjected, no icterus noted; pupils equal, round.    HENT: Atraumatic, normocephalic; Oral mucosa moist  Neck: Trachea midline, neck supple. No thyromegaly noted. CV: No audible murmurs  Resp: No increased WOB, no audible wheezing   GI: Nondistended   Neuro: Alert, oriented, appropriate. Skin: No visible abnormalities  MSK: right shoulder chronic OA changes, AROM limited at shoulder, much improved with PROM, pan positive maneuvers  Ext: No significant edema appreciated. No varicosities.     Lab Results   Component Value Date    WBC 7.3 01/15/2021    HGB 9.8 (L) 01/15/2021    HCT 29.8 (L) 01/15/2021    MCV 90.5 01/15/2021     01/15/2021     Lab Results   Component Value Date    INR 1.10 01/07/2021    INR 0.96 05/18/2015    PROTIME 12.8 01/07/2021    PROTIME 10.3 05/18/2015     Lab Results   Component Value Date    CREATININE 1.1 01/12/2021    BUN 25 (H) 01/12/2021     01/12/2021    K 3.5 01/12/2021     01/12/2021    CO2 28 01/12/2021     Lab Results   Component Value Date    ALT 7 (L) 01/12/2021    AST 13 (L) 01/12/2021    ALKPHOS 90 01/12/2021    BILITOT 0.4 01/12/2021       Most recent EKG revealed  1/12/2021  5:37 PM - Rick, Tjh Incoming Muse Results    Component Value Ref Range & Units Status Collected Lab   Ventricular Rate 78  BPM Final 01/12/2021  6:56 AM 14   Atrial Rate 78  BPM Final 01/12/2021  6:56 AM 14   P-R Interval 136  ms Final 01/12/2021  6:56 AM 14   QRS Duration 82  ms Final 01/12/2021  6:56 AM 14   Q-T Interval 378  ms Final 01/12/2021  6:56 AM 14   QTc Calculation (Bazett) 430  ms Final 01/12/2021  6:56 AM 14   P Axis 78  degrees Final 01/12/2021  6:56 AM 14   R Axis 73  degrees Final 01/12/2021  6:56 AM 14   T Axis 36  degrees Final 01/12/2021  6:56 AM 14   Diagnosis   Final 01/12/2021  6:56 AM 14   ** Poor data quality, interpretation may be adversely affectedNormal sinus rhythmNonspecific ST abnormalityAbnormal ECGWhen compared with ECG of 08-JAN-2021 08:00,No significant change was found       Most recent imaging studies revealed EXAMINATION:   MODIFIED BARIUM SWALLOW WAS PERFORMED IN CONJUNCTION WITH SPEECH PATHOLOGY   SERVICES       TECHNIQUE:   Fluoroscopic evaluation of the swallowing mechanism was performed with   multiple consistency of barium product.       FLUOROSCOPY DOSE AND TYPE OR TIME AND EXPOSURES:   3 minutes 4 seconds, 20 series       COMPARISON:   None       HISTORY:   ORDERING SYSTEM PROVIDED HISTORY: Dysphagia   TECHNOLOGIST PROVIDED HISTORY:   Reason for exam:->Dysphagia   Reason for Exam: Dysphagia   Acuity: Unknown   Type of Exam: Ongoing       FINDINGS:   Thin liquid, nectar thick liquid, honey thick liquid, puree, and soft solid   consistencies were attempted. Maudie Larve was variable laryngeal penetration with   all attempted consistencies, most apparent with thin and nectar thick.  There   is progressive accumulation of barium in the supraglottic larynx ultimately   extending to the vocal cords with no cough reflex.  There was vallecular   pooling with puree, which did not clear with chin tuck swallow and honey   thick liquid wash.           Impression   Laryngeal penetration with all consistencies       Please see separate speech pathology report for full discussion of findings   and recommendations. The above laboratory data have been reviewed. The above imaging data have been reviewed. The above medical testing have been reviewed. Body mass index is 17.43 kg/m². Assessment and Plan:  Debility: Complicated by Parkinson. PT/OT  Dysphagia: dysphagia diet, SLP  CAD: s/p CABG and recent PCI. Parkinson  HTN: currently hypotensive on midodrine  HLD  COPD  Iron deficiency anemia  UTI: cipro    Dispo: Patient is an appropriate ARU candidate. Approved by insurance. Able to accept today. Orders placed for transfer. Thank you for the consultation. Carlos Balderrama MD 1/15/2021, 2:54 PM     * This document was created using dictation software.   While all precautions were taken to ensure accuracy,

## 2021-01-15 NOTE — PROGRESS NOTES
Occupational Therapy  Carolina Atwood    Pt leaving floor to xray upon OT tx attempt. Will f/u for OT tx as time/schedule allows.     Thank you,    Belén Villafana, MOT OTR/L [id number]

## 2021-01-15 NOTE — PROGRESS NOTES
Pt/Ot worked with patient. Patient felt dizzy BP 60/40. Rechecked. 58/39 Dr. Leslie Canton called. IV bolus started 250 ml/hr. Attending will order Midodrine. Patient is awake and alert but feels weak. Current BP 68/32. Attending remains at bedside.

## 2021-01-15 NOTE — DISCHARGE SUMMARY
Hospital Medicine Discharge Summary    Patient ID: Carolina Atwood      Patient's PCP: Shelli Smith MD    Admit Date: 1/11/2021     Discharge Date:   1/15/2021    Admitting Physician: Tanmay Goel MD     Discharge Physician: Dorita Medina MD     Discharge Diagnoses: Active Hospital Problems    Diagnosis    Coronary artery disease [I25.10]     Priority: High    Hypertension [I10]     Priority: High    Chest pain [R07.9]    Paroxysmal atrial fibrillation (HCC) [I48.0]    Hyperlipidemia [E78.5]       The patient was seen and examined on day of discharge and this discharge summary is in conjunction with any daily progress note from day of discharge. Hospital Course:     69-year-old gentleman with history of CAD status post PCI 1/8/2021 for NSTEMI with JAMES x2 to RCA is a direct admit from outside hospital with complaint of chest pain. Troponin is mildly elevated and stable. EKG is nonspecific and unchanged from prior. Cardiology evaluation complete and patient is cleared to go home from cardiology standpoint. Discharge was planned however patient spouse raising the issue of difficulty assisting patient at home and requested PT OT evaluation. Patient qualified and got approved for ARU. PMR accepted. Physical Exam Performed:     /63   Pulse 69   Temp 98.3 °F (36.8 °C) (Oral)   Resp 16   Ht 5' 10\" (1.778 m)   Wt 121 lb 7.6 oz (55.1 kg)   SpO2 98%   BMI 17.43 kg/m²       General appearance:  No apparent distress, appears stated age and cooperative. HEENT:  Normal cephalic, atraumatic without obvious deformity. Pupils equal, round, and reactive to light. Extra ocular muscles intact. Conjunctivae/corneas clear. Neck: Supple, with full range of motion. No jugular venous distention. Trachea midline. Respiratory:  Normal respiratory effort. Clear to auscultation, bilaterally without Rales/Wheezes/Rhonchi.   Cardiovascular:  Regular rate and rhythm with normal S1/S2 without murmurs, rubs or gallops. Abdomen: Soft, non-tender, non-distended with normal bowel sounds. Musculoskeletal:  No clubbing, cyanosis or edema bilaterally. Full range of motion without deformity. Skin: Skin color, texture, turgor normal.  No rashes or lesions. Neurologic:  Neurovascularly intact without any focal sensory/motor deficits. Cranial nerves: II-XII intact, grossly non-focal.  Psychiatric:  Alert and oriented, thought content appropriate, normal insight  Capillary Refill: Brisk,< 3 seconds   Peripheral Pulses: +2 palpable, equal bilaterally       Labs: For convenience and continuity at follow-up the following most recent labs are provided:      CBC:    Lab Results   Component Value Date    WBC 7.3 01/15/2021    HGB 9.8 01/15/2021    HCT 29.8 01/15/2021     01/15/2021       Renal:    Lab Results   Component Value Date     01/12/2021    K 3.5 01/12/2021     01/12/2021    CO2 28 01/12/2021    BUN 25 01/12/2021    CREATININE 1.1 01/12/2021    CALCIUM 9.2 01/12/2021    PHOS 3.7 01/09/2021         Significant Diagnostic Studies    Radiology:   Fluoroscopy modified barium swallow with video   Final Result   Laryngeal penetration with all consistencies      Please see separate speech pathology report for full discussion of findings   and recommendations.                 Consults:     IP CONSULT TO CARDIOLOGY  IP CONSULT TO CASE MANAGEMENT  IP CONSULT TO PHYSICAL MEDICINE REHAB  IP CONSULT TO DIETITIAN  IP CONSULT TO SOCIAL WORK    Disposition: ARU    Condition at Discharge: Stable    Discharge Instructions/Follow-up: Cardiology 1 week  PCP  Area staff    Code Status:  Full Code     Activity: activity as tolerated    Diet: cardiac diet      Discharge Medications:     Current Discharge Medication List           Details   ciprofloxacin (CIPRO) 500 MG tablet Take 1 tablet by mouth every 12 hours for 10 days  Qty: 20 tablet, Refills: 0              Details   carvedilol (COREG) 6.25 MG tablet Take

## 2021-01-15 NOTE — SIGNIFICANT EVENT
Was perfect served by RN regarding patient having low blood pressure while working with PT OT. Patient has parkinsonism and known issue with orthostatic hypotension. Midodrine was held during this admission considering patient was mostly in bed and his blood pressures were running higher. His blood pressure medications were also uptitrated during this admission. Given picture of Parkinson's disease no midline orthostatics it would be wiser not to be aggressive with blood pressure control. Will restart patient back on midodrine 2.5 mg 3 times daily. We will revert blood pressure medications to prior to admission doses. BP has improved patient is still fit to discharge to ARU.

## 2021-01-15 NOTE — PROGRESS NOTES
Occupational Therapy  Facility/Department: [address]  Daily Treatment Note  NAME: Yvette Soares  : 1940  MRN: [id number]    Date of Service: 1/15/2021    Discharge Recommendations: Yvette Soares scored a 14 on the AM-PAC ADL Inpatient form. Current research shows that an AM-PAC score of 17 or less is typically not associated with a discharge to the patient's home setting. Based on the patient's AM-PAC score and their current ADL deficits, it is recommended that the patient have 5-7 sessions per week of Occupational Therapy at d/c to increase the patient's independence. At this time, this patient demonstrates the endurance, and/or tolerance for 3 hours of therapy each day, with a treatment frequency of 5-7x/wk. Please see assessment section for further patient specific details. If patient discharges prior to next session this note will serve as a discharge summary. Please see below for the latest assessment towards goals. OT Equipment Recommendations  Equipment Needed: Yes  Walker: Rolling  Other: Chart reports a cane, electric scooter and RW were recently ordered but not delivered. May need a stair lift    Assessment   Performance deficits / Impairments: Decreased functional mobility ; Decreased endurance;Decreased ADL status; Decreased safe awareness;Decreased cognition;Decreased fine motor control  Assessment: Patient is not at baseline level following admission with chest pain. Chart reports wife having increased difficulty caring for patient at home. Patient would benefit from skilled OT treatment  Treatment Diagnosis: Debility and decreased ADLs due to chest pain, Parkinsons recent NSTEMI. Prognosis: Fair;Good  History: Wife helps with ADLs and IADLs, furniture walks in home, Parkinsons  Exam: Periods of confusion, CGA/min assist with transfers, mod/mas assist ADLs, intention tremors.   Assistance / Modification: RW, evolving presentation  OT Education: OT Role;Plan of Care  Patient Education: Patient needs continued education on ADLs and transfers  Barriers to Learning: Cognition  REQUIRES OT FOLLOW UP: Yes  Activity Tolerance  Activity Tolerance: Patient limited by fatigue;Treatment limited secondary to medical complications (free text)  Activity Tolerance: BP readings: 57/37 after sitting EOB, 60/38 while in semifowlers position. RN alerted and present to assess  Safety Devices  Safety Devices in place: Yes  Type of devices: All fall risk precautions in place; Left in bed;Nurse notified;Call light within reach; Patient at risk for falls; Bed alarm in place  Restraints  Initially in place: No         Patient Diagnosis(es): There were no encounter diagnoses. has a past medical history of CAD (coronary artery disease), COPD (chronic obstructive pulmonary disease) ([facility] [address] Coronary artery bypass, Coronary stent, Hyperlipidemia, Hypertension, Osteoarthritis, Parkinson disease ([facility] [address] Prostate cancer ([facility] [address] and Rotator cuff syndrome. has a past surgical history that includes Prostatectomy (2004); Colonoscopy (2005); Coronary artery bypass graft; Cardiac surgery (1998); Coronary angioplasty (1993); Coronary angioplasty (2013); Cataract removal with implant (2015); sigmoidoscopy (N/A, 1/25/2019); and Colonoscopy (N/A, 8/30/2019). Restrictions  Restrictions/Precautions  Restrictions/Precautions: Fall Risk(High fall risk)  Required Braces or Orthoses?: No  Position Activity Restriction  Other position/activity restrictions: 72-year-old gentleman with history of CAD status post PCI 1/8/2021 for NSTEMI with JAMES x2 to RCA is a direct admit from outside hospital with complaint of chest pain. Troponin is mildly elevated and stable. EKG is nonspecific and unchanged from prior. Cardiology evaluation complete and patient is cleared to go home from cardiology standpoint.   Discharge was planned however patient spouse raising the issue of difficulty assisting patient at home and requested PT OT evaluation for possible SNF versus home care. Subjective   General  Chart Reviewed: Yes  Family / Caregiver Present: No  Diagnosis: Chest pain  Subjective  Subjective: Patient lying upright in bed upon arrival, pleasantly confused but agreeable to treatment. RN present at end of session to assess pt d/t low BP readings  Pre Treatment Pain Screening  Intervention List: Patient able to continue with treatment;Nurse/Physician notified  Vital Signs  Patient Currently in Pain: Denies   Orientation  Orientation  Overall Orientation Status: Within Functional Limits  Orientation Level: Oriented to time;Oriented to place; Disoriented to situation;Oriented to person  Objective    ADL  Additional Comments: Initially agreeable to change gown and don hospital pants, limited d/t pt feeling lightheaded/dizze while seated EOB, with low BP readings revealed when taken - see act tolerance        Balance  Sitting Balance: Contact guard assistance  Standing Balance: Unable to assess(comment)(unsafe to assess secondary to low BP seated EOB and while lying upright in bed)  Bed mobility  Supine to Sit: Minimal assistance  Sit to Supine: Moderate assistance  Comment: Increased assist required secondary to dizziness/lightheadedness seated EOB. BP taken once return to bed, as pt unable to tolerate sitting EOB for BP reading                          Cognition  Overall Cognitive Status: Exceptions  Arousal/Alertness: Appropriate responses to stimuli;Inconsistent responses to stimuli  Following Commands: Follows one step commands with repetition  Attention Span: Appears intact; Attends with cues to redirect  Memory: Decreased recall of recent events;Decreased short term memory  Safety Judgement: Decreased awareness of need for safety  Insights: Decreased awareness of deficits  Initiation: Requires cues for some  Sequencing: Requires cues for some  Cognition Comment: Chart reports periods of confusion.  Patient stated several times that he was

## 2021-01-15 NOTE — PROCEDURES
[address] THERAPY  MODIFIED BARIUM SWALLOW EVALUATION    Patient's Name: Kelvin England. O.B: 1940  Medical Diagnosis: Chest pain [R07.9]  Treatment Diagnosis: Dysphagia    Ordering MD: Dr. Jimmy Torres  Radiologist: Dr. Beryle Spates  Date of Evaluation: 1/15/2021  Type of Study: Modified Barium Swallowing Study (MBS)  Diet Prior to Study: Dysphagia III Soft and Bite-Sized with Honey thick (moderately thick) liquids/no straws/meds with applesauce, pending MBS results   Pain Level: none reported    Pt comment/background: Pt indicated that he eats softer foods at home. He recently got upper/lower dentures but does not have them at hospital currently. Pt reported coughing frequently with all PO intake and that he sometimes feels like he is \"drowning. \" Pt had an MBS completed in 2017 with recommendations for regular texture diet/mildly (nectar) thick liquids with aggressive speech therapy; however, pt reported he did not comply with these diet recommendations, nor did he receive speech therapy services. Impression:  Modified Barium Swallow evaluation completed on 1/15/2021. Results indicate moderate oral and severe pharyngeal dysphagia. Penetration of all consistencies administered was noted (thin liquids, mildly thick liquids, moderately thick liquids, pureed solids, soft solids) with eventual aspiration of previously penetrated material and pharyngeal residue (difficult to determine exact consistencies aspirated due to pharyngeal residue noted with all textures). Overall, swallow is characterized by prolonged/reduced mastication, premature bolus loss to pharynx, absent epiglottic inversion, reduced hyolaryngeal excursion, reduced airway protection, significant pharyngeal residue, and suspected reduced vs absent sensation for timely reflexive cough in the presence of aspirated material and for timely reflexive clearing swallows in response to severe pharyngeal residue.  Pt inconsistently demonstrated pharyngeal pooling of various consistencies and of pharyngeal residue. Thin liquids revealed penetration before the swallow with eventual aspiration of pharyngeal residue/penetrated material. A chin tuck was not successful in preventing penetration/aspiration or assisting with hyolaryngeal elevation. Mildly (nectar) thick liquids revealed penetration during and after the swallow. A head turn maneuver was attempted without success in clearing pharyngeal residue. While taking a sip of moderately (honey) thick liquids, pt aspirated material that had already entered the airway from previous trials without cough response. Trials of solids revealed prolonged/reduced mastication (with eventual expectoration of soft bolus) and piecemeal swallows with pharyngeal residue >1/2 size of the bolus. A cough/re-swallow technique was helpful in clearing some but not all pharyngeal residue. Of note, pt demonstrates anterior curvature of the spine (kyphosis?), which appears to be contributing to narrow pharyngeal space that impacts epiglottic distension and therefore leads to severely reduced pharyngeal clearing. At this time, pt is at HIGH risk of aspiration of all consistencies due to absent epiglottic distension, reduced airway protection, significant pharyngeal residue, suspected poor/reduced sensation, and dx Parkinson's Disease. Also, due to severity of dysphagia pt demonstrates risk for nutritional compromise. See below for recommendations. Aspiration/Penetration Risk:  HIGH risk with all consistencies    Recommendations:    Due to severity of dysfunction and high risk for aspiration Recommend discussion of code status. Diet Level:   1. If aggressive means are chosen, consideration of alternative means of nutrition/hydration may be warranted.   2. If choosing to remain on PO diet, safest diet at this time is Dysphagia II Minced and Moist with thin liquids; recommend small sips of thins; meds in puree    Referral: aggressive speech therapy in rehab or outpatient setting with repeat MBS in 4-6 weeks  Strategies:  Upright 90 degrees at meals; No Straws;  Meds with puree; Small bites/sips; Oral care   Treatments: Speech Therapy for dysphagia treatment  Goals:  1)Pt will demonstrate understanding of results of MBS and recommendations   2) Pt will utilize compensatory swallow strategies/aspiration precautions to minimize risk of aspiration     Consistencies given: Thin, Mildly Thick (Nectar) Liquids, Moderately Thick (honey) Liquids, Puree, Soft solid    Oral Phase  -Reduced bolus control  -Premature bolus loss to pharynx  -Reduced mastication  -Reduced A-P bolus propulsion    Pharyngeal Phase  -inconsistent minimally delayed swallow initiation  -Pooling to the pyriforms of pharyngeal residue  -reduced hyolaryngeal excursion  -ABSENT epiglottic inversion; epiglottis appeared to contact posterior pharyngeal wall   -Decreased Base of tongue retraction   -significant pharyngeal residue/reduced pharyngeal clearing  -penetration of all consistencies with eventual aspiration of penetrated material and/or pharyngeal residue,  Penetration-Aspiration Scale 8:Material enters the airway, passes below the vocal folds, and on effort is made to eject     Esophageal Phase  Unremarkable    Following Evaluation:  Results/recommendations and education given to Patient and nurse, who verbalized understanding    Timed Code Treatment: 0 minutes    Total Treatment Time: 45 minutes        Marlon Yin M.S. [address] [facility]  Speech-Language Pathologist

## 2021-01-16 LAB
HCT VFR BLD CALC: 29 % (ref 40.5–52.5)
HEMOGLOBIN: 9.4 G/DL (ref 13.5–17.5)
MCH RBC QN AUTO: 29.4 PG (ref 26–34)
MCHC RBC AUTO-ENTMCNC: 32.5 G/DL (ref 31–36)
MCV RBC AUTO: 90.5 FL (ref 80–100)
PDW BLD-RTO: 15 % (ref 12.4–15.4)
PLATELET # BLD: 156 K/UL (ref 135–450)
PMV BLD AUTO: 7.9 FL (ref 5–10.5)
RBC # BLD: 3.21 M/UL (ref 4.2–5.9)
WBC # BLD: 5.3 K/UL (ref 4–11)

## 2021-01-16 PROCEDURE — 92526 ORAL FUNCTION THERAPY: CPT

## 2021-01-16 PROCEDURE — 97162 PT EVAL MOD COMPLEX 30 MIN: CPT

## 2021-01-16 PROCEDURE — 92610 EVALUATE SWALLOWING FUNCTION: CPT

## 2021-01-16 PROCEDURE — 6370000000 HC RX 637 (ALT 250 FOR IP): Performed by: PHYSICAL MEDICINE & REHABILITATION

## 2021-01-16 PROCEDURE — 85027 COMPLETE CBC AUTOMATED: CPT

## 2021-01-16 PROCEDURE — 97530 THERAPEUTIC ACTIVITIES: CPT

## 2021-01-16 PROCEDURE — 97535 SELF CARE MNGMENT TRAINING: CPT

## 2021-01-16 PROCEDURE — 6360000002 HC RX W HCPCS: Performed by: PHYSICAL MEDICINE & REHABILITATION

## 2021-01-16 PROCEDURE — 97116 GAIT TRAINING THERAPY: CPT

## 2021-01-16 PROCEDURE — 2580000003 HC RX 258: Performed by: PHYSICAL MEDICINE & REHABILITATION

## 2021-01-16 PROCEDURE — 97166 OT EVAL MOD COMPLEX 45 MIN: CPT

## 2021-01-16 PROCEDURE — 1280000000 HC REHAB R&B

## 2021-01-16 RX ADMIN — FLUDROCORTISONE ACETATE 0.2 MG: 0.1 TABLET ORAL at 09:11

## 2021-01-16 RX ADMIN — CIPROFLOXACIN 500 MG: 500 TABLET, FILM COATED ORAL at 09:08

## 2021-01-16 RX ADMIN — CARBIDOPA AND LEVODOPA 1 TABLET: 25; 100 TABLET, ORALLY DISINTEGRATING ORAL at 23:41

## 2021-01-16 RX ADMIN — CARBIDOPA AND LEVODOPA 1 TABLET: 25; 100 TABLET, ORALLY DISINTEGRATING ORAL at 15:09

## 2021-01-16 RX ADMIN — MIDODRINE HYDROCHLORIDE 2.5 MG: 5 TABLET ORAL at 16:57

## 2021-01-16 RX ADMIN — CARVEDILOL 6.25 MG: 6.25 TABLET, FILM COATED ORAL at 09:08

## 2021-01-16 RX ADMIN — ASPIRIN 81 MG: 81 TABLET, CHEWABLE ORAL at 09:08

## 2021-01-16 RX ADMIN — CLOPIDOGREL 75 MG: 75 TABLET, FILM COATED ORAL at 09:08

## 2021-01-16 RX ADMIN — FAMOTIDINE 20 MG: 20 TABLET, FILM COATED ORAL at 23:36

## 2021-01-16 RX ADMIN — MIDODRINE HYDROCHLORIDE 2.5 MG: 5 TABLET ORAL at 11:57

## 2021-01-16 RX ADMIN — ATORVASTATIN CALCIUM 20 MG: 20 TABLET, FILM COATED ORAL at 23:36

## 2021-01-16 RX ADMIN — CIPROFLOXACIN 500 MG: 500 TABLET, FILM COATED ORAL at 01:18

## 2021-01-16 RX ADMIN — Medication 10 ML: at 09:07

## 2021-01-16 RX ADMIN — CARVEDILOL 6.25 MG: 6.25 TABLET, FILM COATED ORAL at 16:57

## 2021-01-16 RX ADMIN — CARBIDOPA AND LEVODOPA 1 TABLET: 25; 100 TABLET, ORALLY DISINTEGRATING ORAL at 09:07

## 2021-01-16 RX ADMIN — ATORVASTATIN CALCIUM 20 MG: 20 TABLET, FILM COATED ORAL at 01:18

## 2021-01-16 RX ADMIN — CIPROFLOXACIN 500 MG: 500 TABLET, FILM COATED ORAL at 23:36

## 2021-01-16 RX ADMIN — ENOXAPARIN SODIUM 40 MG: 40 INJECTION SUBCUTANEOUS at 09:07

## 2021-01-16 RX ADMIN — MIDODRINE HYDROCHLORIDE 2.5 MG: 5 TABLET ORAL at 09:08

## 2021-01-16 RX ADMIN — FAMOTIDINE 20 MG: 20 TABLET, FILM COATED ORAL at 01:18

## 2021-01-16 RX ADMIN — FAMOTIDINE 20 MG: 20 TABLET, FILM COATED ORAL at 09:07

## 2021-01-16 RX ADMIN — CARBIDOPA AND LEVODOPA 1 TABLET: 25; 100 TABLET, ORALLY DISINTEGRATING ORAL at 01:19

## 2021-01-16 NOTE — PROGRESS NOTES
Physical Therapy    Facility/Department: [facility] ACUTE REHAB UNIT  Initial Assessment    NAME: Carly Bishop  : 1940  MRN: [id number]    Date of Service: 2021    Discharge Recommendations:  24 hour supervision or assist, 2-3 sessions per week, S Level 3   PT Equipment Recommendations  Equipment Needed: No  Other: TBD based on therapy progress    Assessment   Body structures, Functions, Activity limitations: Decreased functional mobility ; Decreased ROM; Decreased safe awareness;Decreased endurance;Decreased balance;Decreased posture;Decreased ADL status; Decreased strength  Assessment: Patient demonstrates weakness, balance, strength, and cardiovascular endurance deficits. He is not at his current functional baseline. Patient will benefit from skilled PT to improve functional deficits and return patient to prior functional level. Treatment Diagnosis: decreased functional mobility, impaired gait, decreased balance, decreased endurance  Prognosis: Good  Decision Making: Medium Complexity  Exam: MMT, ROM, functional mobility assessment  Clinical Presentation: evolving  PT Education: PT Role;Goals;Plan of Care;General Safety;Transfer Training;Energy Conservation;Gait Training;Functional Mobility Training  Patient Education: Patient verbalized understanding. Barriers to Learning: None evident during this evaluation. REQUIRES PT FOLLOW UP: Yes  Activity Tolerance  Activity Tolerance: Patient Tolerated treatment well;Patient limited by fatigue;Patient limited by endurance;Treatment limited secondary to medical complications (free text)  Activity Tolerance: orthostatic hypotension limits patient's standing activity tolerance. Patient Diagnosis(es): There were no encounter diagnoses.      has a past medical history of CAD (coronary artery disease), COPD (chronic obstructive pulmonary disease) ([facility] [address] Coronary artery bypass, Coronary stent, Hyperlipidemia, Hypertension, Osteoarthritis, Parkinson disease ([facility] [address] Prostate cancer ([facility] [address] and Rotator cuff syndrome. has a past surgical history that includes Prostatectomy (2004); Colonoscopy (2005); Coronary artery bypass graft; Cardiac surgery (1998); Coronary angioplasty (1993); Coronary angioplasty (2013); Cataract removal with implant (2015); sigmoidoscopy (N/A, 1/25/2019); and Colonoscopy (N/A, 8/30/2019). Restrictions  Restrictions/Precautions  Restrictions/Precautions: Fall Risk(high fall risk)  Required Braces or Orthoses?: No  Position Activity Restriction  Other position/activity restrictions: 52-year-old gentleman with history of CAD status post PCI 1/8/2021 for NSTEMI with JAMES x2 to RCA is a direct admit from outside hospital with complaint of chest pain. Troponin is mildly elevated and stable. EKG is nonspecific and unchanged from prior. Cardiology evaluation complete and patient is cleared to go home from cardiology standpoint. Discharge was planned however patient spouse raising the issue of difficulty assisting patient at home and requested PT OT evaluation for possible SNF versus home care. Vision/Hearing  Vision: Within Functional Limits  Hearing: Exceptions to [facility]  Hearing Exceptions: Hard of hearing/hearing concerns       Subjective  General  Chart Reviewed: Yes  Family / Caregiver Present: No  Diagnosis: chest pain  Follows Commands: Within Functional Limits  General Comment  Comments: Patient long sitting in bed, bed alarm, telesitter, and box alarm all in place. Subjective  Subjective: Patient reports he is feeling well, trying to eat but states he doesn't have much of an appetite.   Pain Screening  Patient Currently in Pain: Denies  Vital Signs  Patient Currently in Pain: Denies     Orientation  Orientation  Overall Orientation Status: Within Functional Limits     Social/Functional History  Social/Functional History  Lives With: Family(spouse, daughter and grandson)  Type of Home: House  Home Layout: Two level, Bed/Bath upstairs(states kitchen is upstairs and he sleeps in lower level, enters through basement)  Home Access: Level entry  Bathroom Shower/Tub: Walk-in shower  Bathroom Toilet: Standard  Bathroom Equipment: Grab bars in shower  Bathroom Accessibility: Walker accessible  Home Equipment: Cane  ADL Assistance: Needs assistance(states spouse assists PRN (drying off after showers))  Homemaking Responsibilities: No(spouse/daughter performs)  Ambulation Assistance: Independent(states he holds onto furniture at home, PRN use of [address])  Transfer Assistance: Independent  Active : No  Patient's  Info: spouse/daughter provide transportation  Occupation: Retired  Type of occupation: construction  Additional Comments: states approved for scootor and RW but has not been delievered, states cardiologist told him he needed a scooter, states 1 recent fall     Objective  AROM RLE (degrees)  RLE AROM: WFL  AROM LLE (degrees)  LLE AROM : WNL  Strength RLE  Strength RLE: WFL  Strength LLE  Strength LLE: WFL     Bed mobility  Rolling to Left: Supervision  Rolling to Right: Supervision  Supine to Sit: Minimal assistance  Sit to Supine: Moderate assistance  Scooting: Stand by assistance  Transfers  Sit to Stand: Minimal Assistance  Stand to sit: Minimal Assistance  Bed to Chair: Minimal assistance  Stand Pivot Transfers: Minimal Assistance  Car Transfer: Minimal Assistance  Comment: Patient became dizzy with each sit-stand and would become pre-syncopal with standing any longer than approximately 30-45 seconds. Ambulation  Ambulation?: Yes  More Ambulation?: No  Ambulation 1  Surface: level tile  Device: No Device  Assistance: Minimal assistance  Quality of Gait: unsteady, flexed posture, impulsive, impaired safety awareness  Gait Deviations: Slow Stacey;Decreased step length;Decreased step height;Decreased arm swing;Decreased head and trunk rotation; Shuffles  Distance: 27'  Comments: Patient unable to ambulate further d/t low BP  Stairs/Curb  Stairs?: Yes  Stairs  # Steps : 4  Stairs Height: 6\"  Rails: Bilateral  Curbs: 4\"  Assistance: Minimal assistance  Comment: MIN assist for both stairs and curb step. Balance  Posture: Poor  Sitting - Static: Good  Sitting - Dynamic: Good  Standing - Static: Fair  Standing - Dynamic: [address]  Times per week: 75 minutes/day, 5 days/week  Current Treatment Recommendations: Strengthening, Balance Training, Functional Mobility Training, Transfer Training, Gait Training, Stair training, Home Exercise Program, Safety Education & Training, Endurance Training, Patient/Caregiver Education & Training, Equipment Evaluation, Education, & procurement  Safety Devices  Type of devices: All fall risk precautions in place, Bed alarm in place, Call light within reach, Telesitter in use, Patient at risk for falls, Left in bed, Nurse notified, Gait belt  Restraints  Initially in place: No    Goals  Short term goals  Time Frame for Short term goals: 7-14 days  Short term goal 1: Patient will perform bed mobility independently. Short term goal 2: Patient will perform bed-chair transfer MOD I w/ LRAD. Short term goal 3: Patient will ambulate 150' MOD I w/ LRAD. Short term goal 4: Patient will negotiate up/down 12 stairs w/ single railing MOD I to access 2nd floor kitchen.   Patient Goals   Patient goals : to get home     Therapy Time   Individual Concurrent Group Co-treatment   Time In 0915         Time Out 1000         Minutes 45         Timed Code Treatment Minutes: 30 Minutes     Second Session Therapy Time:   Individual Concurrent Group Co-treatment   Time In 1045         Time Out 1115         Minutes 30         Timed Code Treatment Minutes:  30 minutes  Total Treatment Minutes:  Kayleigh Cheng, GRETTA, ATC-R [id number]

## 2021-01-16 NOTE — H&P
Patient: Dayanara Mustafa  [id number]  Date: 1/16/2021      Chief Complaint: Weakness     History of Present Illness/Hospital Course:  67-year-old male with a history of COPD, Parkinson, prostate cancer, HTN, HLD, and CAD status post recent PCI and discharged on 1/9. He was readmitted on 1/11 with chest pain. Repeat cardiac work-up was performed and cardiology suggested no further intervention at this time. He was evaluated by therapy and suggested to continue in an inpatient setting prior to returning home. He reports feeling diffusely weak and feeling like his hospitalizations have led to increased muscle wasting. He also reports chronic right shoulder pain and difficulty raising his right arm. No other complaints today.     Prior Level of Function:  Modified independent for ambulation, assistance with ADLs     Current Level of Function:  Chandra CORREIA     has a past medical history of CAD (coronary artery disease), COPD (chronic obstructive pulmonary disease) ([facility] [address] Coronary artery bypass, Coronary stent, Hyperlipidemia, Hypertension, Osteoarthritis, Parkinson disease ([facility] [address] Prostate cancer ([facility] [address] and Rotator cuff syndrome. has a past surgical history that includes Prostatectomy (2004); Colonoscopy (2005); Coronary artery bypass graft; Cardiac surgery (1998); Coronary angioplasty (1993); Coronary angioplasty (2013); Cataract removal with implant (2015); sigmoidoscopy (N/A, 1/25/2019); and Colonoscopy (N/A, 8/30/2019). reports that he quit smoking about 37 years ago. He has a 60.00 pack-year smoking history. He has never used smokeless tobacco. He reports that he does not drink alcohol or use drugs. family history includes Cancer in his mother and sister; Colon Cancer in his mother and sister. Allergies: Patient has no known allergies.     Current Facility-Administered Medications   Medication Dose Route Frequency Provider Last Rate Last Admin    aspirin chewable tablet 81 mg  81 mg Oral Daily Meek Saldaña MD Evette   81 mg at 01/16/21 0908    enoxaparin (LOVENOX) injection 40 mg  40 mg Subcutaneous Daily Adrián Zurita MD   40 mg at 01/16/21 4331    nitroGLYCERIN (NITROSTAT) SL tablet 0.4 mg  0.4 mg Sublingual Q5 Min PRN Adrián Zurita MD        polyethylene glycol [facility]) packet 17 g  17 g Oral Daily PRN Adrián Zurita MD        sodium chloride flush 0.9 % injection 10 mL  10 mL Intravenous 2 times per day Adrián Zurita MD   10 mL at 01/16/21 0907    sodium chloride flush 0.9 % injection 10 mL  10 mL Intravenous PRN Adrián Zurita MD        acetaminophen (TYLENOL) tablet 650 mg  650 mg Oral Q4H PRN Adrián Zurita MD        atorvastatin (LIPITOR) tablet 20 mg  20 mg Oral Nightly Adrián Zurita MD   20 mg at 01/16/21 0118    carbidopa-levodopa (PARCOPA)  MG per disintegrating tablet 1 tablet  1 tablet Oral TID Adrián Zurita MD   1 tablet at 01/16/21 0907    carvedilol (COREG) tablet 6.25 mg  6.25 mg Oral BID  Adrián Zurita MD   6.25 mg at 01/16/21 0908    ciprofloxacin (CIPRO) tablet 500 mg  500 mg Oral 2 times per day Adrián Zurita MD   500 mg at 01/16/21 0908    clopidogrel (PLAVIX) tablet 75 mg  75 mg Oral Daily Adrián Zurita MD   75 mg at 01/16/21 0908    diphenhydrAMINE (BENADRYL) tablet 25 mg  25 mg Oral Q6H PRN Adrián Zurita MD        famotidine (PEPCID) tablet 20 mg  20 mg Oral BID Adrián Zurita MD   20 mg at 01/16/21 6398    fludrocortisone (FLORINEF) tablet 0.2 mg  0.2 mg Oral Daily Adrián Zurita MD   0.2 mg at 01/16/21 0911    hydrALAZINE (APRESOLINE) tablet 50 mg  50 mg Oral Q8H PRN Adrián Zurita MD        midodrine (PROAMATINE) tablet 2.5 mg  2.5 mg Oral TID  Adrián Zurita MD   2.5 mg at 01/16/21 0908    ondansetron (ZOFRAN-ODT) disintegrating tablet 4 mg  4 mg Oral Q8H PRN Adrián Zurita MD        traMADol [employer]) tablet 50 mg  50 mg Oral Q6H PRN Adrián Zurita MD        traZODone (DESYREL) tablet 50 mg  50 mg Oral Nightly ODESSA Zurita MD           REVIEW OF SYSTEMS:   CONSTITUTIONAL: negative for fevers, chills, diaphoresis, appetite change, night sweats and unexpected weight change. HEENT: negative for hearing loss, tinnitus, ear drainage, sinus pressure, nasal congestion, epistaxis and snoring. RESPIRATORY: Negative for hemoptysis, cough, sputum production. CARDIOVASCULAR: negative for chest pain, palpitations, exertional chest pressure/discomfort, edema, syncope. GASTROINTESTINAL: negative for nausea, vomiting, diarrhea, constipation, blood in stool and abdominal pain. GENITOURINARY: negative for frequency, dysuria, urinary incontinence, decreased urine volume, and hematuria. HEMATOLOGIC/LYMPHATIC: negative for easy bruising, bleeding and lymphadenopathy. ALLERGIC/IMMUNOLOGIC: negative for recurrent infections, angioedema, anaphylaxis and drug reactions. ENDOCRINE: negative for weight changes and diabetic symptoms including polyuria, polydipsia and polyphagia. MUSCULOSKELETAL: positive for pain, joint swelling, decreased range of motion and muscle weakness. NEUROLOGICAL: negative for headaches, slurred speech, unilateral weakness. PSYCHIATRIC/BEHAVIORAL: negative for hallucinations, behavioral problems, confusion and agitation.      All pertinent positives are noted in the HPI. Physical Examination:  Vitals:   Patient Vitals for the past 24 hrs:   BP Temp Temp src Pulse Resp SpO2 Height   01/16/21 0904 (!) 148/84 98.2 °F (36.8 °C) Oral 75 16 95 % --   01/16/21 0058 (!) 156/78 98.2 °F (36.8 °C) Oral 73 -- 94 % 5' 9\" (1.753 m)     Psych: Stable mood, normal judgement, normal affect   Const: No distress, thin and wasted  Eyes: Conjunctiva noninjected, no icterus noted; pupils equal, round. HENT: Atraumatic, normocephalic; Oral mucosa moist  Neck: Trachea midline, neck supple. No thyromegaly noted.   CV: Regular rate and rhythm, no murmur rub or gallop noted  Resp: Lungs clear to auscultation bilaterally, no rales wheezes or ronchi, no retractions. Respirations unlabored. GI: Soft, nontender, nondistended. Normal bowel sounds. No palpable masses. Neuro: Alert, oriented, appropriate. No cranial nerve deficits appreciated. Sensation intact to light touch. Motor examination reveals: LUE 4/5 RUE 3/5 SA (limited by pain) 4/5 distal BLE 3/5 HF 4/5 distal. Reflexes normal and symmetric. Skin: Normal temperature and turgor  MSK: right shoulder chronic OA changes, AROM limited at shoulder, much improved with PROM, pan positive maneuvers  Ext: No significant edema appreciated. No varicosities.     Lab Results   Component Value Date    WBC 5.3 01/16/2021    HGB 9.4 (L) 01/16/2021    HCT 29.0 (L) 01/16/2021    MCV 90.5 01/16/2021     01/16/2021     Lab Results   Component Value Date    INR 1.10 01/07/2021    INR 0.96 05/18/2015    PROTIME 12.8 01/07/2021    PROTIME 10.3 05/18/2015     Lab Results   Component Value Date    CREATININE 1.1 01/12/2021    BUN 25 (H) 01/12/2021     01/12/2021    K 3.5 01/12/2021     01/12/2021    CO2 28 01/12/2021     Lab Results   Component Value Date    ALT 7 (L) 01/12/2021    AST 13 (L) 01/12/2021    ALKPHOS 90 01/12/2021    BILITOT 0.4 01/12/2021       Most recent EKG revealed  1/12/2021  5:37 PM - Rick, Juan F Incoming Muse Results     Component Value Ref Range & Units Status Collected Lab   Ventricular Rate 78  BPM Final 01/12/2021  6:56 AM 14   Atrial Rate 78  BPM Final 01/12/2021  6:56 AM 14   P-R Interval 136  ms Final 01/12/2021  6:56 AM 14   QRS Duration 82  ms Final 01/12/2021  6:56 AM 14   Q-T Interval 378  ms Final 01/12/2021  6:56 AM 14   QTc Calculation (Bazett) 430  ms Final 01/12/2021  6:56 AM 14   P Axis 78  degrees Final 01/12/2021  6:56 AM 14   R Axis 73  degrees Final 01/12/2021  6:56 AM 14   T Axis 36  degrees Final 01/12/2021  6:56 AM 14   Diagnosis     Final 01/12/2021  6:56 AM 14   ** Poor data quality, interpretation may be adversely affectedNormal sinus rhythmNonspecific ST abnormalityAbnormal ECGWhen compared with ECG of 08-JAN-2021 08:00,No significant change was found         Most recent imaging studies revealed   EXAMINATION:   MODIFIED BARIUM SWALLOW WAS PERFORMED IN CONJUNCTION WITH SPEECH PATHOLOGY   SERVICES       TECHNIQUE:   Fluoroscopic evaluation of the swallowing mechanism was performed with   multiple consistency of barium product.       FLUOROSCOPY DOSE AND TYPE OR TIME AND EXPOSURES:   3 minutes 4 seconds, 20 series       COMPARISON:   None       HISTORY:   ORDERING SYSTEM PROVIDED HISTORY: Dysphagia   TECHNOLOGIST PROVIDED HISTORY:   Reason for exam:->Dysphagia   Reason for Exam: Dysphagia   Acuity: Unknown   Type of Exam: Ongoing       FINDINGS:   Thin liquid, nectar thick liquid, honey thick liquid, puree, and soft solid   consistencies were attempted. Muñoz Hoop was variable laryngeal penetration with   all attempted consistencies, most apparent with thin and nectar thick.  There   is progressive accumulation of barium in the supraglottic larynx ultimately   extending to the vocal cords with no cough reflex.  There was vallecular   pooling with puree, which did not clear with chin tuck swallow and honey   thick liquid wash.           Impression   Laryngeal penetration with all consistencies       Please see separate speech pathology report for full discussion of findings   and recommendations.            The above laboratory data have been reviewed.      The above imaging data have been reviewed.      The above medical testing have been reviewed. Body mass index is 17.94 kg/m². Barriers to Discharge: Weakness, endurance, safety, ADLs    POST ADMISSION PHYSICIAN EVALUATION  The patient has agreed to being admitted to our comprehensive inpatient  rehabilitation facility consisting of at least 180 minutes of therapy a day,  5 out of 7 days a week. The patient/family has a good understanding of our discharge process. The  patient has potential to make improvement and is in need of at least two of  the following multidisciplinary therapies including but not limited to  physical, occupational, respiratory, and speech, nutritional services, wound care, and prosthetics and orthotics. Given the patients complex condition  and risk of further medical complications, rehabilitation services cannot be  safely provided at a lower level of care such as a skilled nursing facility. I have compared the patients medical and functional status at the time of the  preadmission screening and the same on this date, and there are no significant changes except as documented below in the assessment and plan. By signing this document, I acknowledge that I have personally performed a  full physical examination on this patient within 24 hours of admission to  this inpatient rehabilitation facility and have determined the patient to be  able to tolerate the above course of treatment at an intensive level for a  reasonable period of time. I will be completing a detailed individualized  Plan of Care for this patient by day four of the patients stay based upon the  Preadmission Screen, this Post-Admission Evaluation, and the therapy  evaluations. Assessment and Plan:  Debility: Complicated by Parkinson. PT/OT    Dysphagia: dysphagia diet, SLP    CAD: s/p CABG and recent PCI. Parkinson: Sinemet per home regimen    HTN: currently hypotensive on midodrine and Florinef    HLD: Lipitor 20    COPD: No current meds    Iron deficiency anemia: s/p supplementation    UTI: cipro    Bowels: Per protocol  Bladder: Per protocol   Sleep: Trazodone provided prn. Pain: Tylenol, tramadol  DVT PPx: Lovenox  ELOS: 14-17    Klever Crow MD 1/16/2021, 9:46 AM     * This document was created using dictation software. While all precautions were taken to ensure accuracy, errors may have occurred. Please disregard any typographical errors.

## 2021-01-16 NOTE — PROGRESS NOTES
Patient had a significant choking episode with his Minced and Moist diet. Attending notified via [employer].

## 2021-01-16 NOTE — PLAN OF CARE
ARU PATIENT TREATMENT PLAN  [address]. [facility], [address]  [phone]      Luly Disla    : 1940  Acct #: [de-identified]  MRN: [id number]  PHYSICIAN:  Anna Marie Caballero MD  Primary Problem    Patient Active Problem List   Diagnosis    COPD (chronic obstructive pulmonary disease)    Coronary artery disease    Hypertension    Hyperlipidemia    Fever    SOB (shortness of breath)    Vertigo    Exertional dyspnea    Paroxysmal atrial fibrillation (HCC)    Parkinson disease ([facility] [address]    Hypotension    Chest pain    Severe malnutrition (HCC)    NSTEMI (non-ST elevated myocardial infarction) ([facility] [address]    Debility       Rehabilitation Diagnosis:      Parkinson: Sinemet per home regimen    Debility: Complicated by Parkinson. PT/OT     Dysphagia: dysphagia diet, SLP     CAD: s/p CABG and recent PCI.      HTN: currently hypotensive on midodrine and Florinef     HLD: Lipitor 20     COPD: No current meds     Iron deficiency anemia: s/p supplementation     UTI: cipro     ADMIT DATE:1/15/2021    Patient Goals: To go home with wife. Admitting Impairments: Neurological Condition - Parkinsonism  Activities: Impaired Eating, Hygiene, Toileting, Bathing, Dressing, Bed Mobility, Transfers, Ambulation, Stairs, and Endurance. Participation: Prior to admission, patient was living at home with wife, was independent with ambulation and transfers with a cane, required assistance for ADLs, did not drive.      CARE PLAN     NURSING:  Luly Disla while on this unit will:  [x] Be continent of bowel and bladder     [x] Have an adequate number of bowel movements  [] Urinate with no urinary retention >300ml in bladder  [] Complete bladder protocol with person removal  [x] Maintain O2 SATs at 90__90_%  [x] Have pain managed while on ARU       [] Be pain free by discharge   [x] Have no skin breakdown while on ARU  [] Have improved skin integrity via wound measurements  [] Have no signs/symptoms of infection at the wound site  [x] Be free from injury during hospitalization   [x] Complete education with patient/family with understanding demonstrated for:  [] Adjustment   [] Other:     Nursing Interventions will include:  [x] bowel/bladder training   [x] education for medical assistive devices   [x] medication education   [x] O2 saturation management   [x] energy conservation   [x] stress management techniques   [x] fall prevention   [x] alarms protocol   [] seating and positioning   [] skin/wound care   [x] pressure relief instruction   [x] dressing changes     [] infection protection   [x] DVT prophylaxis  [x] assistance with in room safety with transfers to bed, toilet, wheelchair, shower   [x] bathroom activities and hygiene  [] Other:    Patient/Caregiver Education for:  [x] Disease/sustained injury/management     [x] Medication Use  [] Surgical intervention  [x] Safety  [x] Body mechanics and or joint protection  [x] Health maintenance     [] Other:     PHYSICAL THERAPY:  Goals:        Short term goals  Time Frame for Short term goals: 7-14 days  Short term goal 1: Patient will perform bed mobility independently. Short term goal 2: Patient will perform bed-chair transfer MOD I w/ LRAD. Short term goal 3: Patient will ambulate 150' MOD I w/ LRAD. Short term goal 4: Patient will negotiate up/down 12 stairs w/ single railing MOD I to access 2nd floor kitchen. Short term goals = Long term goals  These goals were reviewed with this patient at the time of assessment and Lula Hanks is in agreement.      Plan of Care: Pt to be seen 5 out of 7 days per week per ARU protocol (75 minutes with PT)                  Current Treatment Recommendations: Strengthening, Balance Training, Functional Mobility Training, Transfer Training, Gait Training, Stair training, Home Exercise Program, Safety Education & Training, Endurance Training, Patient/Caregiver Education & Training, Equipment Evaluation, Education, & procurement    OCCUPATIONAL THERAPY:  Goals:             Short term goals  Time Frame for Short term goals: 2 weeks  Short term goal 1: Supervision for bathing. Short term goal 2: Mod I dressing. Short term goal 3: Mod I toileting. Short term goal 4: Mod I for functional transfers. Short term goal 5: Mod I for functional mobility. Short term goal 6: Mod I grooming :    :  These goals were reviewed with this patient at the time of assessment and Lauren Naik is in agreement    Plan of Care:  Pt to be seen 5 out of 7 days per week per ARU protocol ( 75 minutes with OT)  Patient Education: therex to increase BP - pt requires continued education, not independent in learning    SPEECH THERAPY: Goals will be left blank if speech is not following this patient. Goals:             Short-term Goals  Timeframe for Short-term Goals: 2 weeks  Goal 1: Pt will tolerate recommended diet level with no overt s/s aspiration  Goal 2: Pt will complete a variety of swallow maneuvers in order to improve swallow function. Goal 3: Pt will recall and utilize safe swallow strategies in order to improve safety during po intake. Plan of Care:  Pt to be seen 5 out of 7 days per week per ARU protocol ( 30 minutes with SLP)    Therapy Treatments will include:  [x]  therapeutic exercises    []  gait training     []  neuromuscular re-ed                            [x]  transfer training             [] community reintegration    [x] bed mobility                          []  w/c mobility and training  [x]  self care    []home mgmt    []  cognitive training            [x]  energy conservation        []  dysphagia tx    []  speech/language/communication therapy   []  group therapy    [x]  patient/family education    [] Other:    CASE MANAGEMENT:  Goals:   Assist patient/family with discharge planning, patient/family counseling, and coordination with insurance during ARU stay.        Lauren Naik will be seen a minimum of 3 hours of therapy per day, a minimum of 5 out of 7 days per week  (please see above for specific treatment plan per PT/OT/SLP). [] In this rare instance due to the nature of this patient's medical involvement, this patient will be seen 15 hours per week (900 minutes within a 7 day period). In addition, dietician/nutritionist may monitor calorie count as well as intake and collaboratively work with SLP on dietary upgrades. Neuropsychology/Psychology may evaluate and provide necessary support. Medical issues being managed closely and that require 24 hour availability of a physician:  [x] Swallowing Precautions  [x] Bowel/Bladder Fx  [] Weight bearing precautions  [] Wound Care    [x] Pain Mgmt   [] Infection Protection  [x] DVT Prophylaxis   [x] Fall Precautions  [x] Fluid/Electrolyte/Nutrition Balance  [x] Voice Protection   [x] Respiratory  [] Other:    Medical Prognosis: [] Good  [x] Fair    [] Guarded   Total expected IRF days 14  Anticipated discharge destination:Home  [x] Home Independently   [] Home with supervision    []SNF     [] Other                                           Physician anticipated functional outcomes:  Patient will progress to being independent for all mobility and activities upon discharge. IPOC brief synthesis: 67-year-old male with a history of COPD, Parkinson, prostate cancer, HTN, HLD, and CAD status post recent PCI and discharged on 1/9. Charla Smith was readmitted on 1/11 with chest pain.  Repeat cardiac work-up was performed and cardiology suggested no further intervention at this time. Charla Smith was evaluated by therapy and suggested to continue in an inpatient setting prior to returning home.  He was admitted with the above goal.      I have reviewed this initial plan of care and agree with its contents:    Title   Name    Date    Time    Physician: Electronically signed by Rogelio Reyes MD on 1/18/2021 at 2:41 PM      Case Mgmt: MARYAN Ramirez, 1/18/2021, 10:46    OT: Marely Abraham, [id number], 1/16/2021, 1519    PT:  Minesh Hinds [place], DPT, ATC-R [id number] 1/16/2021 1135    RN: Sylvia Carrion RN BSN 1/16/2021 0229    ST: Justin De Souza, [facility] [id number] 1/16/2021    :  Nellie Jaramillo PT, DPT [id number]  1/18/21  11:40 AM

## 2021-01-16 NOTE — PROGRESS NOTES
Patient admitted to room 4910 per bed. . Oriented to room, call light, phone, TV, thermostat, bed controls, bathroom, emergency cord, white board, therapy times, unit routine, visiting hours,  and meal times. Patient verbalized understanding. States bowel routine is unknown. Call light and personal items in reach, alarms active and in place.  Pt in low bed with posy bed alarm on forwarded to the RN phone

## 2021-01-16 NOTE — PROGRESS NOTES
Occupational Therapy   Occupational Therapy Initial Assessment  Date: 2021   Patient Name: Amanda Adan  MRN: [id number]     : 1940    Date of Service: 2021    Discharge Recommendations:  Home with Home health OT, 24 hour supervision or assist, S Level 3  OT Equipment Recommendations  Equipment Needed: Yes  Mobility Devices: ADL Assistive Devices  ADL Assistive Devices: Shower Chair with back  Other: continue to assess    Assessment   Performance deficits / Impairments: Decreased functional mobility ; Decreased endurance;Decreased ADL status; Decreased safe awareness;Decreased cognition;Decreased fine motor control;Decreased ROM; Decreased strength  Assessment: Pt is not at baseline level of function and will benefit from continued OT to address the above limitations. Pt very limited secondary to low BP and being symptomatic. Treatment Diagnosis: Debility and decreased ADLs due to chest pain, Parkinsons recent NSTEMI. Prognosis: Fair  Decision Making: Medium Complexity  OT Education: OT Role;Plan of Care;ADL Adaptive Strategies;Transfer Training;Precautions  Patient Education: therex to increase BP - pt requires continued education, not independent in learning  Barriers to Learning: Cognition  REQUIRES OT FOLLOW UP: Yes  Activity Tolerance  Activity Tolerance: Treatment limited secondary to medical complications (free text)  Activity Tolerance: See above for BP readings. Safety Devices  Safety Devices in place: Yes  Type of devices: All fall risk precautions in place; Left in bed;Bed alarm in place;Call light within reach; Patient at risk for falls(telesitter in place)           Patient Diagnosis(es): There were no encounter diagnoses.      has a past medical history of CAD (coronary artery disease), COPD (chronic obstructive pulmonary disease) ([facility] Coronary artery bypass, Coronary stent, Hyperlipidemia, Hypertension, Osteoarthritis, Parkinson disease ([facility] [address] [facility] Prostate cancer ([facility] [address] and Rotator cuff syndrome. has a past surgical history that includes Prostatectomy (2004); Colonoscopy (2005); Coronary artery bypass graft; Cardiac surgery (1998); Coronary angioplasty (1993); Coronary angioplasty (2013); Cataract removal with implant (2015); sigmoidoscopy (N/A, 1/25/2019); and Colonoscopy (N/A, 8/30/2019). Treatment Diagnosis: Debility and decreased ADLs due to chest pain, Parkinsons recent NSTEMI. Restrictions  Restrictions/Precautions  Restrictions/Precautions: Fall Risk, Modified Diet(high fall risk; diet minced and moist, no straws)  Required Braces or Orthoses?: No  Position Activity Restriction  Other position/activity restrictions: 71-year-old gentleman with history of CAD status post PCI 1/8/2021 for NSTEMI with JAMES x2 to RCA is a direct admit from outside hospital with complaint of chest pain. Troponin is mildly elevated and stable. EKG is nonspecific and unchanged from prior. Cardiology evaluation complete and patient is cleared to go home from cardiology standpoint. Subjective   General  Chart Reviewed: Yes  Additional Pertinent Hx: CAD, CABG, COPD, HTN, prostate cancer, OA  Family / Caregiver Present: No  Diagnosis: Chest pain, debility  Subjective  Subjective: Pt lying supine in bed upon arrival, agreeable to OT evaluation. Pt looking forward to taking a bath.   Patient Currently in Pain: Denies  Vital Signs  Patient Currently in Pain: Denies  Social/Functional History  Social/Functional History  Lives With: Family(spouse, daughter and grandson)  Type of Home: House(bi-level home)  Home Layout: Two level, Bed/Bath upstairs(states kitchen and shower are upstairs and he sleeps in lower level, enters through basement, 12 steps total to get to [employer])  Home Access: Level entry  Bathroom Shower/Tub: Walk-in shower  Bathroom Toilet: Standard  Bathroom Equipment: Grab bars in shower  Bathroom Accessibility: Walker accessible  Home Equipment: Cane  ADL Assistance: Needs assistance(states spouse assists PRN (drying off after showers))  Homemaking Responsibilities: No(spouse/daughter performs)  Ambulation Assistance: Independent(states he holds onto furniture at home, PRN use of [facility])  Transfer Assistance: Independent  Active : No  Patient's  Info: spouse/daughter provide transportation  Occupation: Retired  Type of occupation: construction  IADL Comments: pt reports wife completes all household responsibilities and is interested in having an aide assist \"about 2 days/week\" or so  Additional Comments: states approved for scootor and RW but has not been delievered, states cardiologist told him he needed a scooter, states 1 recent fall       Objective   Vision: Impaired  Vision Exceptions: Wears glasses at all times(needs new prescription)  Hearing: Exceptions to [facility]  Hearing Exceptions: Hard of hearing/hearing concerns    Orientation  Overall Orientation Status: Within Functional Limits  Observation/Palpation  Posture: Fair  Observation: forward head, rounded shoulders  Balance  Sitting Balance: Contact guard assistance  Standing Balance: Contact guard assistance(after completing ADLs supine, pt was able to tolerate completing LE therex and then stand x30 sec with CGA)  Standing Balance  Time: x30 sec  Activity: static stance with RW  Comment: pt unable to tolerate standing past 30 seconds secondary to decreased BP and pt being symptomatic  Functional Mobility  Functional Mobility Comments: ANNEMARIE, unsafe to complete  Toilet Transfers  Toilet Transfers Comments: ANNEMARIE, unsafe to attempt  Shower Transfers  Shower Transfers Comments: ANNEMARIE, unsafe to attempt  ADL  Grooming: Minimal assistance(to comb hair, washed face supine with set-up)  UE Bathing: Supervision;Setup(completed supine in bed)  LE Bathing: Maximum assistance(to wash posterior tia area/buttocks, LEs)  UE Dressing: Maximum assistance(to don sweatshirt supine and in long-sitting)  LE Dressing: Dependent/Total(doffed/donned pants/depends supine, sidelying, and bridging; socks)  Toileting: None  Additional Comments: Pt transferred to EOB, sat EOB up to 3 min, c/o dizziness. Pt cued to complete ankle pumps and glute squeezes. BP 60/37. BP re-taken ~1 min later, BP 68/38. RN alerted. Pt returned to supine position. HOB flat and raised slightly for pt's UB comfort, BP 80/45. Pt completed the above ADLs from supine position d/t medical concerns/low BP. Tone RUE  RUE Tone: Normotonic  Tone LUE  LUE Tone: Normotonic  Coordination  Movements Are Fluid And Coordinated: No  Coordination and Movement description: Fine motor impairments;Right UE;Tremors; Resting tremors;Gross motor impairments;Decreased accuracy; Decreased speed  Quality of Movement Other  Comment: Parkinsons tremors in right hand     Bed mobility  Rolling to Left: Supervision  Rolling to Right: Supervision  Supine to Sit: Stand by assistance  Sit to Supine: Stand by assistance  Scooting: Stand by assistance  Comment: rolled R and L multiple times for ADLs, supine<>sit x2 throughout session  Transfers  Sit to stand: Contact guard assistance  Stand to sit: Contact guard assistance  Transfer Comments: cues for hand placement, sit<>stand only  Vision - Basic Assessment  Prior Vision: Wears glasses all the time  Patient Visual Report: No visual complaint reported. Vision Comments: glasses not present, pt reports that he needs new prescription  Cognition  Overall Cognitive Status: Exceptions  Arousal/Alertness: Delayed responses to stimuli  Following Commands:  Follows one step commands with repetition  Attention Span: Attends with cues to redirect  Memory: Decreased recall of recent events;Decreased short term memory  Safety Judgement: Decreased awareness of need for safety  Problem Solving: Assistance required to implement solutions;Assistance required to generate solutions  Insights: Decreased awareness of deficits  Initiation: Requires cues for some  Sequencing: Requires cues for some Sensation  Overall Sensation Status: [facility]  Type of ROM/Therapeutic Exercise  Comment: after remaining supine for ADLs for extended period of time, pt completed ankle pumps x20, glute squeezes x20 prior to supine>sit. Pt transferred supine>sit, BP 89/51. Pt completed sit<>stand to RW, stood x30 sec. C/o \"haziness\" and returned to sitting EOB, BP 79/50. Pt then completed x5 shoulder flexion, 2x10 scapular squeezes with SBA. Pt then returned to supine position. LUE AROM (degrees)  LUE AROM : WFL  LUE General AROM: approx 0-120 degrees shoulder flexion  RUE AROM (degrees)  RUE AROM : Exceptions  RUE General AROM: distally [facility]  R Shoulder Flexion 0-180: 0-~100 degrees - limited by OA  LUE Strength  Gross LUE Strength: Exceptions to [facility]  L Shoulder Flex: 3-/5  L Shoulder Ext: 4/5  L Elbow Flex: 4/5  L Elbow Ext: 4/5  L Hand General: 4-/5  RUE Strength  Gross RUE Strength: Exceptions to [facility]  R Shoulder Flex: 3-/5  R Shoulder Ext: 4/5  R Elbow Flex: 4/5  R Elbow Ext: 4/5  R Hand General: 4-/5                   Plan   Plan  Times per week: 75 min/day, 5/7 days/week  Times per day: Daily  Current Treatment Recommendations: Strengthening, ROM, Functional Mobility Training, Endurance Training, Safety Education & Training, Self-Care / ADL, Equipment Evaluation, Education, & procurement, Patient/Caregiver Education & Training         Goals  Short term goals  Time Frame for Short term goals: 2 weeks  Short term goal 1: Supervision for bathing. Short term goal 2: Mod I dressing. Short term goal 3: Mod I toileting. Short term goal 4: Mod I for functional transfers. Short term goal 5: Mod I for functional mobility.   Short term goal 6: Mod I grooming  Patient Goals   Patient goals : Go home with wife       Therapy Time   Individual Concurrent Group Co-treatment   Time In 1320         Time Out 1435         Minutes 75               Timed Code Treatment Minutes:  60 Minutes    Total Treatment Minutes:  [address] [facility],  Zaida Grover, [place], OTR/L [id number]

## 2021-01-16 NOTE — PROGRESS NOTES
Patient discharge and transferred to ARU. IV and telemetry removed. Patient sent with personal belongings. Wife notified.

## 2021-01-16 NOTE — PROGRESS NOTES
Speech Language Pathology  Facility/Department: [facility] ACUTE REHAB UNIT   CLINICAL BEDSIDE SWALLOW EVALUATION    NAME: Mary Garsia  : 1940  MRN: [id number]    ADMISSION DATE: 1/15/2021  ADMITTING DIAGNOSIS: has COPD (chronic obstructive pulmonary disease); Coronary artery disease; Hypertension; Hyperlipidemia; Fever; SOB (shortness of breath); Vertigo; Exertional dyspnea; Paroxysmal atrial fibrillation ([address] Parkinson disease ([address] Hypotension; Chest pain; Severe malnutrition ([address] NSTEMI (non-ST elevated myocardial infarction) ([address] and Debility on their problem list.      Recent Chest Xray of Chest: (21 )  Impression   No acute process. Date of Eval: 2021  Evaluating Therapist: Sukhjinder Stallworth    Current Diet level:  Current Diet : Dysphagia Minced and Moist (Dysphagia II)  Current Liquid Diet : Thin      Pain: denied       Reason for Referral  Mary Garsia was referred for a bedside swallow evaluation to assess the efficiency of his swallow function, identify signs and symptoms of aspiration and make recommendations regarding safe dietary consistencies, effective compensatory strategies, and safe eating environment. Pt with MBS completed 1/15/21. Penetration of all consistencies administered was noted (thin liquids, mildly thick liquids, moderately thick liquids, pureed solids, soft solids) with eventual aspiration of previously penetrated material and pharyngeal residue (difficult to determine exact consistencies aspirated due to pharyngeal residue noted with all textures). At this time, pt is at HIGH risk of aspiration of all consistencies due to absent epiglottic distension, reduced airway protection, significant pharyngeal residue, suspected poor/reduced sensation, and dx Parkinson's Disease. Also, due to severity of dysphagia pt demonstrates risk for nutritional compromise. See below for recommendations.      Recommendations:    Due to severity of dysfunction and high Goals  Timeframe for Short-term Goals: 2 weeks  Goal 1: Pt will tolerate recommended diet level with no overt s/s aspiration  Goal 2: Pt will complete a variety of swallow maneuvers in order to improve swallow function. Goal 3: Pt will recall and utilize safe swallow strategies in order to improve safety during po intake. Long-term Goals  Timeframe for Long-term Goals: 3 weeks  Goal 1: Pt will tolerate safest diet level to obtain optimum nutrition with no overt s/s of aspiration    General  Chart Reviewed: Yes  Subjective  Subjective: Pt sitting upright in bed, willing to participate in evaluation. Behavior/Cognition: Cooperative; Alert  Respiratory Status: Room air  O2 Device: None (Room air)  Dentition: Edentulous(Pt has dentures but not at hospital)  Patient Positioning: Upright in bed  Baseline Vocal Quality: Weak  Volitional Cough: Weak  Prior Dysphagia History: Prior MBS 12/2017: Soft regular texture diet with NECTAR thick liquids with STRICT use of CHIN TUCK, no straws, medication with puree  Consistencies Administered: Thin - cup;Dysphagia Pureed (Dysphagia I); Dysphagia Minced and Moist (Dysphagia II)      Oral Motor Deficits  Oral/Motor  Oral Motor: Exceptions to [facility]  Labial Strength: Reduced  Labial Sensation: Reduced  Lingual ROM: Reduced left; Reduced right  Lingual Strength: Reduced  Lingual Coordination: Reduced    Oral Phase Dysfunction  Oral Phase  Oral Phase: Exceptions  Oral Phase Dysfunction  Impaired Mastication: Mechanical soft  Decreased Anterior to Posterior Transit: Mechanical soft  Suspected Premature Bolus Loss: Thin  Lingual/Palatal Residue: Mechanical soft  Oral Phase  Oral Phase - Comment: Moderate oral phase dysphagia     Indicators of Pharyngeal Phase Dysfunction   Pharyngeal Phase  Pharyngeal Phase: Exceptions  Indicators of Pharyngeal Phase Dysfunction  Delayed Swallow: Thin - cup;Mechanical soft  Decreased Laryngeal Elevation: Thin;Mechanical soft  Wet Vocal Quality:  Thin  Cough - Immediate: Thin - cup  Cough - Delayed: Thin - cup  Pharyngeal Phase   Pharyngeal: Severe oral phase    Prognosis  Prognosis  Prognosis for safe diet advancement: fair  Barriers to reach goals: age;motivation;severity of dysphagia  Individuals consulted  Consulted and agree with results and recommendations: Patient    Education  Patient Education: Provided in depth education re: pt's swallow function, diet recommendations, and safe swallow strategies to implement during intake  Patient Education Response: Verbalizes understanding  Safety Devices in place: Yes  Type of devices: All fall risk precautions in place; Left in bed;Bed alarm in place;Call light within reach       Therapy Time  SLP Individual Minutes  Time In: 1000  Time Out: [address]  Minutes: 1 [facility]. ARobb  [id number] [place] [address], [id number]  Speech-Language Pathologist   1/16/2021 12:48 PM

## 2021-01-16 NOTE — PROGRESS NOTES
Supplement  Nutrition Education/Counseling:  Education not indicated   Coordination of Nutrition Care:  Continue to monitor while inpatient    Goals:  PO greater than 50% at meals/supp       Nutrition Monitoring and Evaluation:   Behavioral-Environmental Outcomes:  None Identified   Food/Nutrient Intake Outcomes:  Food and Nutrient Intake, Supplement Intake  Physical Signs/Symptoms Outcomes:  Weight     Discharge Planning:     Too soon to determine     Electronically signed by Dedrick Connolly RD, CNSC, LD on 1/16/21 at 11:25 AM EST    Contact: [phone]

## 2021-01-17 PROCEDURE — 1280000000 HC REHAB R&B

## 2021-01-17 PROCEDURE — 6370000000 HC RX 637 (ALT 250 FOR IP): Performed by: PHYSICAL MEDICINE & REHABILITATION

## 2021-01-17 PROCEDURE — 6360000002 HC RX W HCPCS: Performed by: PHYSICAL MEDICINE & REHABILITATION

## 2021-01-17 PROCEDURE — 2580000003 HC RX 258: Performed by: PHYSICAL MEDICINE & REHABILITATION

## 2021-01-17 RX ADMIN — CLOPIDOGREL 75 MG: 75 TABLET, FILM COATED ORAL at 09:15

## 2021-01-17 RX ADMIN — CIPROFLOXACIN 500 MG: 500 TABLET, FILM COATED ORAL at 20:47

## 2021-01-17 RX ADMIN — MIDODRINE HYDROCHLORIDE 2.5 MG: 5 TABLET ORAL at 13:49

## 2021-01-17 RX ADMIN — FLUDROCORTISONE ACETATE 0.2 MG: 0.1 TABLET ORAL at 09:15

## 2021-01-17 RX ADMIN — CARBIDOPA AND LEVODOPA 1 TABLET: 25; 100 TABLET, ORALLY DISINTEGRATING ORAL at 20:47

## 2021-01-17 RX ADMIN — CARBIDOPA AND LEVODOPA 1 TABLET: 25; 100 TABLET, ORALLY DISINTEGRATING ORAL at 13:49

## 2021-01-17 RX ADMIN — MIDODRINE HYDROCHLORIDE 2.5 MG: 5 TABLET ORAL at 16:42

## 2021-01-17 RX ADMIN — CARBIDOPA AND LEVODOPA 1 TABLET: 25; 100 TABLET, ORALLY DISINTEGRATING ORAL at 09:15

## 2021-01-17 RX ADMIN — ASPIRIN 81 MG: 81 TABLET, CHEWABLE ORAL at 09:15

## 2021-01-17 RX ADMIN — CARVEDILOL 6.25 MG: 6.25 TABLET, FILM COATED ORAL at 16:42

## 2021-01-17 RX ADMIN — CARVEDILOL 6.25 MG: 6.25 TABLET, FILM COATED ORAL at 09:14

## 2021-01-17 RX ADMIN — ENOXAPARIN SODIUM 40 MG: 40 INJECTION SUBCUTANEOUS at 09:14

## 2021-01-17 RX ADMIN — CIPROFLOXACIN 500 MG: 500 TABLET, FILM COATED ORAL at 09:15

## 2021-01-17 RX ADMIN — FAMOTIDINE 20 MG: 20 TABLET, FILM COATED ORAL at 20:47

## 2021-01-17 RX ADMIN — FAMOTIDINE 20 MG: 20 TABLET, FILM COATED ORAL at 09:15

## 2021-01-17 RX ADMIN — MIDODRINE HYDROCHLORIDE 2.5 MG: 5 TABLET ORAL at 09:15

## 2021-01-17 RX ADMIN — Medication 10 ML: at 09:14

## 2021-01-17 RX ADMIN — ATORVASTATIN CALCIUM 20 MG: 20 TABLET, FILM COATED ORAL at 20:47

## 2021-01-17 ASSESSMENT — PAIN SCALES - GENERAL: PAINLEVEL_OUTOF10: 0

## 2021-01-18 LAB
ANION GAP SERPL CALCULATED.3IONS-SCNC: 7 MMOL/L (ref 3–16)
BUN BLDV-MCNC: 25 MG/DL (ref 7–20)
CALCIUM SERPL-MCNC: 8.8 MG/DL (ref 8.3–10.6)
CHLORIDE BLD-SCNC: 103 MMOL/L (ref 99–110)
CO2: 27 MMOL/L (ref 21–32)
CREAT SERPL-MCNC: 1.2 MG/DL (ref 0.8–1.3)
GFR AFRICAN AMERICAN: >60
GFR NON-AFRICAN AMERICAN: 58
GLUCOSE BLD-MCNC: 97 MG/DL (ref 70–99)
HCT VFR BLD CALC: 31.4 % (ref 40.5–52.5)
HEMOGLOBIN: 10.2 G/DL (ref 13.5–17.5)
MCH RBC QN AUTO: 29.5 PG (ref 26–34)
MCHC RBC AUTO-ENTMCNC: 32.7 G/DL (ref 31–36)
MCV RBC AUTO: 90.3 FL (ref 80–100)
PDW BLD-RTO: 15 % (ref 12.4–15.4)
PLATELET # BLD: 177 K/UL (ref 135–450)
PMV BLD AUTO: 8.2 FL (ref 5–10.5)
POTASSIUM SERPL-SCNC: 3.5 MMOL/L (ref 3.5–5.1)
RBC # BLD: 3.47 M/UL (ref 4.2–5.9)
SODIUM BLD-SCNC: 137 MMOL/L (ref 136–145)
WBC # BLD: 8.1 K/UL (ref 4–11)

## 2021-01-18 PROCEDURE — 6360000002 HC RX W HCPCS: Performed by: PHYSICAL MEDICINE & REHABILITATION

## 2021-01-18 PROCEDURE — 80048 BASIC METABOLIC PNL TOTAL CA: CPT

## 2021-01-18 PROCEDURE — 1280000000 HC REHAB R&B

## 2021-01-18 PROCEDURE — 97535 SELF CARE MNGMENT TRAINING: CPT

## 2021-01-18 PROCEDURE — 6370000000 HC RX 637 (ALT 250 FOR IP): Performed by: PHYSICAL MEDICINE & REHABILITATION

## 2021-01-18 PROCEDURE — 92526 ORAL FUNCTION THERAPY: CPT

## 2021-01-18 PROCEDURE — 36415 COLL VENOUS BLD VENIPUNCTURE: CPT

## 2021-01-18 PROCEDURE — 97530 THERAPEUTIC ACTIVITIES: CPT

## 2021-01-18 PROCEDURE — 85027 COMPLETE CBC AUTOMATED: CPT

## 2021-01-18 PROCEDURE — 97110 THERAPEUTIC EXERCISES: CPT

## 2021-01-18 RX ORDER — MIDODRINE HYDROCHLORIDE 5 MG/1
5 TABLET ORAL
Status: DISCONTINUED | OUTPATIENT
Start: 2021-01-18 | End: 2021-01-20

## 2021-01-18 RX ADMIN — MIDODRINE HYDROCHLORIDE 5 MG: 5 TABLET ORAL at 12:53

## 2021-01-18 RX ADMIN — ATORVASTATIN CALCIUM 20 MG: 20 TABLET, FILM COATED ORAL at 21:55

## 2021-01-18 RX ADMIN — CIPROFLOXACIN 500 MG: 500 TABLET, FILM COATED ORAL at 21:55

## 2021-01-18 RX ADMIN — FLUDROCORTISONE ACETATE 0.2 MG: 0.1 TABLET ORAL at 07:45

## 2021-01-18 RX ADMIN — ENOXAPARIN SODIUM 40 MG: 40 INJECTION SUBCUTANEOUS at 07:38

## 2021-01-18 RX ADMIN — ASPIRIN 81 MG: 81 TABLET, CHEWABLE ORAL at 07:38

## 2021-01-18 RX ADMIN — FAMOTIDINE 20 MG: 20 TABLET, FILM COATED ORAL at 07:38

## 2021-01-18 RX ADMIN — CARBIDOPA AND LEVODOPA 1 TABLET: 25; 100 TABLET, ORALLY DISINTEGRATING ORAL at 21:56

## 2021-01-18 RX ADMIN — CARBIDOPA AND LEVODOPA 1 TABLET: 25; 100 TABLET, ORALLY DISINTEGRATING ORAL at 09:24

## 2021-01-18 RX ADMIN — CARVEDILOL 6.25 MG: 6.25 TABLET, FILM COATED ORAL at 17:14

## 2021-01-18 RX ADMIN — MIDODRINE HYDROCHLORIDE 5 MG: 5 TABLET ORAL at 17:14

## 2021-01-18 RX ADMIN — HYDRALAZINE HYDROCHLORIDE 50 MG: 25 TABLET, FILM COATED ORAL at 21:55

## 2021-01-18 RX ADMIN — CARVEDILOL 6.25 MG: 6.25 TABLET, FILM COATED ORAL at 07:38

## 2021-01-18 RX ADMIN — CIPROFLOXACIN 500 MG: 500 TABLET, FILM COATED ORAL at 07:38

## 2021-01-18 RX ADMIN — CLOPIDOGREL 75 MG: 75 TABLET, FILM COATED ORAL at 07:38

## 2021-01-18 RX ADMIN — FAMOTIDINE 20 MG: 20 TABLET, FILM COATED ORAL at 21:55

## 2021-01-18 RX ADMIN — CARBIDOPA AND LEVODOPA 1 TABLET: 25; 100 TABLET, ORALLY DISINTEGRATING ORAL at 12:53

## 2021-01-18 RX ADMIN — MIDODRINE HYDROCHLORIDE 2.5 MG: 5 TABLET ORAL at 07:37

## 2021-01-18 ASSESSMENT — PAIN SCALES - WONG BAKER: WONGBAKER_NUMERICALRESPONSE: 0

## 2021-01-18 ASSESSMENT — PAIN SCALES - GENERAL
PAINLEVEL_OUTOF10: 0
PAINLEVEL_OUTOF10: 0

## 2021-01-18 NOTE — DISCHARGE INSTR - COC
Continuity of Care Form    Patient Name: Lauren Naik   :  1940  MRN:  [id number]    Admit date:  1/15/2021  Discharge date:  2021    Code Status Order: Full Code   Advance Directives:   [facility] Documentation       Date/Time Healthcare Directive Type of Healthcare Directive Copy in [facility] [address] Agent's Name Healthcare Agent's Phone Number    [phone]  No, patient does not have an advance directive for healthcare treatment -- -- -- -- --            Admitting Physician:  Akhil Gonzáles MD  PCP: Berna Curling, MD    Discharging Nurse: [address] [place] [address] Unit/Room#: HQO-4846/3674-11  Discharging Unit Phone Number: ***    Emergency Contact:   Extended Emergency Contact Information  Primary Emergency Contact: Pooja Lombardi  Address: [place] [address] Ve[address] Phone: [phone]  Mobile Phone: [phone]  Relation: Spouse  Secondary Emergency Contact: Rojelio Garcia [address] Phone: [phone]  Mobile Phone: [phone]  Relation: Child    Past Surgical History:  Past Surgical History:   Procedure Laterality Date   [address] WITH IMPLANT      COLONOSCOPY      COLONOSCOPY N/A 2019    COLONOSCOPY WITH BIOPSY performed by Kelvin Hadley MD at [address] [place] [address]    x 2    CORONARY ANGIOPLASTY  2013    x 1 stent    CORONARY ARTERY BYPASS GRAFT      , barrett    PROSTATECTOMY  2004    Cured, dr Vicente Mejia SIGMOIDOSCOPY N/A 2019    SIGMOIDOSCOPY SCREENING performed by Kelvin Hadley MD at [address] [facility] ENDOSCOPY       Immunization History: There is no immunization history on file for this patient.     Active Problems:  Patient Active Problem List   Diagnosis Code    COPD (chronic obstructive pulmonary disease) J44.9    Coronary artery disease I25.10    Hypertension I10    Hyperlipidemia E78.5    Fever R50.9    SOB (shortness of breath) R06.02    Vertigo R42    Exertional dyspnea R06.00    Paroxysmal atrial fibrillation (HCC) I48.0    Parkinson disease (HCC) G20    Hypotension I95.9    Chest pain R07.9    Severe malnutrition ([address] E43    NSTEMI (non-ST elevated myocardial infarction) ([facility] I21.4    Debility R53.81       Isolation/Infection:   Isolation            No Isolation          Patient Infection Status       None to display            Nurse Assessment:  Last Vital Signs: /67   Pulse 70   Temp 98.2 °F (36.8 °C) (Oral)   Resp 14   Ht 5' 9\" (1.753 m)   Wt 112 lb 14 oz (51.2 kg)   SpO2 96%   BMI 16.67 kg/m²     Last documented pain score (0-10 scale): Pain Level: 0  Last Weight:   Wt Readings from Last 1 Encounters:   01/18/21 112 lb 14 oz (51.2 kg)     Mental Status:  {IP PT MENTAL STATUS:20030}    IV Access:  - None    Nursing Mobility/ADLs:  Walking   Assisted  Transfer  Assisted  Bathing  Assisted  Dressing  Assisted  Toileting  Assisted  Feeding  Independent  Med Admin  Assisted  Med Delivery   whole and prefers mixed with Apple Sauce     Wound Care Documentation and Therapy:        Elimination:  Continence:   · Bowel: Yes  · Bladder: Yes  Urinary Catheter: None   Colostomy/Ileostomy/Ileal Conduit: No       Date of Last BM: 2/2/21    Intake/Output Summary (Last 24 hours) at 1/18/2021 0915  Last data filed at 1/18/2021 9941  Gross per 24 hour   Intake --   Output 500 ml   Net -500 ml     I/O last 3 completed shifts:  In: -   Out: 500 [Urine:500]    Safety Concerns:     History of Falls (last 30 days) and At Risk for Falls    Impairments/Disabilities:      Vision and Hearing    Nutrition Therapy:  Current Nutrition Therapy:   - Oral Diet:  General    Routes of Feeding: Oral  Liquids:  Thin Liquids  Daily Fluid Restriction: no  Last Modified Barium Swallow with Video (Video Swallowing Test): not done    Treatments at the Time of Hospital Discharge: Respiratory Treatments: ***  Oxygen Therapy:  is not on home oxygen therapy.   Ventilator:    {MH CC Vent BFFV:[id number]}    Rehab Therapies: Physical Therapy, Occupational Therapy, Speech/Language Therapy and Nursing  Weight Bearing Status/Restrictions: No weight bearing restirctions  Other Medical Equipment (for information only, NOT a DME order):  wheelchair and walker  Other Treatments:  [address]: LEVEL 3 [address] to establish plan of care for patient over 60 day period   [address] Initial home SN evaluation visit to occur within 24-48 hours for:   medication management   VS and clinical assessment   S&S chronic disease exacerbation education + when to contact MD / NP   care coordination   Medication Reconciliation during 1st SN visit     PT/OT/Speech    Evaluations in home within 24-48 hours of discharge to include DME and home safety   [facility] Frontload therapy 5 days, then 3x a week    OT to evaluate if patient has [address] needs for personal care       evaluation within 24-48 hours to evaluate resources [place] for potential AL, [place], LTC, and Medicaid options       Palliative Care referral within 5 days of hospital discharge  PCP Visit scheduled within 3 - 7 days of hospital discharge      Telehealth-Homecare Vitals(If patient is agreeable and meets guidelines)    Patient's personal belongings (please select all that are sent with patient):  Glasses, Dentures upper and lower    RN SIGNATURE:  Electronically signed by Tasha Mccarty RN on 2/2/21 at 12:32 PM EST    CASE MANAGEMENT/SOCIAL WORK SECTION    Inpatient Status Date: ***    Readmission Risk Assessment Score:  Readmission Risk              Risk of Unplanned Readmission:        13           Discharging to Facility/ Agency     Name: Veena Rizzo  will call for Appointment  Phone: [phone]  Fax: [phone]        / signature: Electronically signed by Delta 116, MSW on 2/2/2021 at 8:20 AM    PHYSICIAN SECTION    Prognosis: Fair    Condition at Discharge: Stable    Rehab Potential (if transferring to Rehab): Fair    Recommended Labs or Other Treatments After Discharge: Home care. Medication and disease management. RN PT OT ST    Physician Certification: I certify the above information and transfer of Gricelda Carrasco  is necessary for the continuing treatment of the diagnosis listed and that he requires [address] for greater 30 days.      Update Admission H&P: No change in H&P    PHYSICIAN SIGNATURE:  Electronically signed by Chip Temple MD on 2/2/21 at 9:17 AM EST

## 2021-01-18 NOTE — PATIENT CARE CONFERENCE
[facility]  Inpatient Rehabilitation  Weekly Team Conference Note    Patient Name: Jese López        MRN: [id number]    : 1940  (79 y.o.)  Gender: male      Diagnosis: chest pain    The team conference for this patient was held on 21 at 11:00am by:  Tim Adames MD    CASE MANAGEMENT:  Assessment: Pending Home Care referral.    PHYSICAL THERAPY:    Bed Mobility:   Scooting: Supervision(verbal cues for scooting towards HOB towards R)    Transfers:  Sit to Stand: Contact guard assistance(able to tolerate 45 sec of standing before needing to sit)  Stand to sit: Contact guard assistance  Bed to Chair: Minimal assistance  Comment: 2nd session: Pt is supine in bed, asleep. Reports that does not have any additional energy from morning session and does not have an appetite. Continued monitoring of BP. Supine 117/73. Supine < sit with SPV. Seated BP 76/45. Pt symptomatic and tries to lie down. Performed seated exercises including seated hip flex x 10 reps, hip add x 10 reps, ankle pumps x 10 reps. Pt tolerated well. BP 72/44 after exercises. Sit < stand with CGA. Immediately reported dizziness. Attempted BP and did not read. Seated BP 88/57. Pt requested to lay down. Performed supine LE exer: heel slides x 10 reps, hip abd x 10 reps, ankle pumps x 10 reps. /63 after exer. Pt left in bed with call bed, bed alarm on. Ambulation 1  Surface: level tile  Device: No Device  Assistance: Minimal assistance  Quality of Gait: unsteady, flexed posture, impulsive, impaired safety awareness  Gait Deviations: Slow Stacey, Decreased step length, Decreased step height, Decreased arm swing, Decreased head and trunk rotation, Shuffles  Distance: 30'  Comments: Patient unable to ambulate further d/t low BP    Stairs  # Steps : 4  Stairs Height: 6\"  Rails: Bilateral  Curbs: 4\"  Assistance: Minimal assistance  Comment: MIN assist for both stairs and curb step.     QM:  Roll Left and Right  Assistance Needed: Supervision or touching assistance  CARE Score: 4  Discharge Goal: Independent  Sit to Lying  Assistance Needed: Supervision or touching assistance  CARE Score: 4  Discharge Goal: Independent  Lying to Sitting on Side of Bed  Assistance Needed: Supervision or touching assistance  CARE Score: 4  Discharge Goal: Independent  Sit to Stand  Assistance Needed: Supervision or touching assistance  CARE Score: 4  Discharge Goal: Independent  Chair/Bed-to-Chair Transfer  Assistance Needed: Partial/moderate assistance  CARE Score: 3  Discharge Goal: Independent  Car Transfer  Assistance Needed: Partial/moderate assistance  CARE Score: 3  Discharge Goal: Independent  Walk 10 Feet  Assistance Needed: Partial/moderate assistance  CARE Score: 3  Discharge Goal: Independent  Walk 50 Feet with Two Turns  Assistance Needed: Partial/moderate assistance  CARE Score: 3  Discharge Goal: Independent  Walk 150 Feet  Comment: Patient's BP 77/55 in sitting, reports dizziness upon standing which requires him to sit as he verbalizes he feels he will faint.   Reason if not Attempted: Not attempted due to medical condition or safety concerns  CARE Score: 88  Walking 10 Feet on Uneven Surfaces  Assistance Needed: Partial/moderate assistance  CARE Score: 3  Discharge Goal: Independent  1 Step (Curb)  Assistance Needed: Partial/moderate assistance  CARE Score: 3  Discharge Goal: Independent  4 Steps  Assistance Needed: Partial/moderate assistance  CARE Score: 3  Discharge Goal: Independent  12 Steps  Comment: orthostatic hypotension  Reason if not Attempted: Not attempted due to medical condition or safety concerns  CARE Score: 88  Picking Up Object  Assistance Needed: Supervision or touching assistance  CARE Score: 4  Discharge Goal: Independent  Wheelchair Ability  Uses a Wheelchair and/or Scooter?: No    SPEECH THERAPY:    Diet Level:DIET DYSPHAGIA MINCED AND MOIST; No Caffeine, No Drinking Straw  Dietary Nutrition Supplements: Frozen Oral Supplement    Assessment:  Dysphagia goals just initiated. Continues to present at high risk for aspiration and nutritional compromise. Pt agreeable to further speech language cognitive evaluation to be completed later this date, acknowledges recent cognitive decline and hallucinations since hospitalization. OCCUPATIONAL THERAPY:    ADL:   ADL  Grooming: Minimal assistance, Increased time to complete, Verbal cueing(VC for sequencing.)  UE Bathing: Supervision, Setup(completed supine in bed)  LE Bathing: Maximum assistance(to wash posterior tia area/buttocks, LEs)  UE Dressing: Maximum assistance(to don sweatshirt supine and in long-sitting)  LE Dressing: Minimal assistance  Toileting: Minimal assistance  Additional Comments: Pt with no complaints of dizzness. All ADLs completed in seated or EOB. Toilet Transfers:   Toilet Transfers  Toilet - Technique: Ambulating  Equipment Used: Grab bars  Toilet Transfer: Contact guard assistance  Toilet Transfers Comments: ANNEMARIE, unsafe to attempt    Tub/ShowerTransfers:     Shower Transfers  Shower Transfers Comments: ANNEMARIE, unsafe to attempt    QM:  Eating  Assistance Needed: Partial/moderate assistance  CARE Score: 3  Discharge Goal: Independent  Oral Hygiene  Assistance Needed: Setup or clean-up assistance  CARE Score: 5  Discharge Goal: [address] Needed: Supervision or touching assistance  Comment: (pt used urinal on own staff emptied)  CARE Score: 4  Discharge Goal: Independent  Toilet Transfer  Assistance Needed: Partial/moderate assistance  Comment: (did not do this shift)  CARE Score: 3  Discharge Goal: Independent  Shower/Bathe Self  Assistance Needed: Partial/moderate assistance  CARE Score: 3  Discharge Goal: Supervision or touching assistance  Upper Body Dressing  Assistance Needed: Partial/moderate assistance  CARE Score: 3  Discharge Goal: Independent  Lower Body Dressing  Assistance Needed: Partial/moderate assistance  CARE Score: 3  Discharge Goal: Independent  Putting On/Taking Off Footwear  Assistance Needed: Partial/moderate assistance  CARE Score: 3  Discharge Goal: Independent    NUTRITION:  Weight: 112 lb 14 oz (51.2 kg) / Body mass index is 16.67 kg/m². Diet Order:Dysphagia minced & moist    Supplements:magic cup TID    Po intake poor prior to admission. Pt would like Magic cups with meals to help improve nutrition status. Please see nutrition note for details. NURSING:   [place] Fall Risk Score: 80  Wounds/Incisions/Ulcers: None  Medication Review: Meds reviewed with patient daily. Pain: Controlled with medication as needed  Consultations/Labs/X-rays: Consult to Social Work for discharge disposition  Labs:  BNP & CBC every MWF  Risk for Readmission: 15%    Patient/Family Education provided by team:    Discharge Plan   Estimated Length of Stay:14 days  Destination: home health  Pass:No  Services at Discharge: [id number], Occupational Therapy and Speech Therapy   Equipment at Discharge: Shower chair with back, more TBD with progress  Factors facilitating achievement of predicted outcomes: will  Barriers to the achievement of predicted outcomes: orthostasis  Patient Goals:to get home, Go home with wife,     Plan of Care  Interdisciplinary Individualized Plan of Care Review:    Continue Current Plan of Care:  Yes  Modifications:_____________________________    Rehab Team Members in attendance for Team Conference:  Vladimir Mcnulty, MSW, LSW    Ryan Gordon, RD, LD    [employer], OTR/L    [employer], PT, DPT    Armando Leon M.A., CCC-SLP    Charo Quiñones, MSN, RN, Víctor Jiménez PT, DPT,     I approve the established interdisciplinary plan of care as documented within the medical record of Selvin Delicia.     Anuel Cano MD  Electronically signed by Anuel Cano MD on 1/19/2021 at 11:41 AM

## 2021-01-18 NOTE — PROGRESS NOTES
Queta Galvan  1/18/2021  [id number]    Chief Complaint: Debility    Subjective:   No overnight events. Significant orthostasis with PT this morning going from 857M to 12D systolic. Patient reports he does not feel as bad as he normally does at home when he gets significantly dizzy upon standing however standing is limited due to his BP. Labs pending. ROS: No CP, SOB, dyspnea    Objective:  Patient Vitals for the past 24 hrs:   BP Temp Temp src Pulse Resp SpO2 Weight   01/18/21 0727 107/67 98.2 °F (36.8 °C) Oral 70 14 96 % --   01/18/21 0700 -- -- -- -- -- -- 112 lb 14 oz (51.2 kg)   01/17/21 2030 128/80 97.6 °F (36.4 °C) Oral 65 16 92 % --   01/17/21 1641 (!) 153/91 -- -- 65 16 -- --   01/17/21 1345 (!) 90/51 -- -- 70 -- -- --     Gen: No distress, pleasant. Resting in bed. Thin  HEENT: Normocephalic, atraumatic. CV: Regular rate and rhythm. No MRG   Resp: No respiratory distress. CTAB   Abd: Soft, nontender nondistended  Ext: No edema. Neuro: Alert, oriented, appropriately interactive. Laboratory data: Available via EMR. Therapy progress:  PT  Position Activity Restriction  Other position/activity restrictions: 78-year-old gentleman with history of CAD status post PCI 1/8/2021 for NSTEMI with JAMES x2 to RCA is a direct admit from outside hospital with complaint of chest pain. Troponin is mildly elevated and stable. EKG is nonspecific and unchanged from prior. Cardiology evaluation complete and patient is cleared to go home from cardiology standpoint.   Objective     Sit to Stand: Contact guard assistance(able to tolerate 45 sec of standing before needing to sit)  Stand to sit: Contact guard assistance  Bed to Chair: Minimal assistance  Device: No Device  Assistance: Minimal assistance  Distance: 30'  OT  PT Equipment Recommendations  Equipment Needed: No  Other: TBD based on therapy progress  Toilet Transfers Comments: ANNEMARIE, unsafe to attempt  Assessment        SLP  Current Diet : Dysphagia Minced

## 2021-01-18 NOTE — PROGRESS NOTES
Occupational Therapy  Facility/Department: [facility] ACUTE REHAB UNIT  Daily Treatment Note  NAME: Kartik Alonzo  : 1940  MRN: [id number]    Date of Service: 2021    Discharge Recommendations:  Home with Home health OT, 24 hour supervision or assist, S Level 3  OT Equipment Recommendations  Walker: Rolling  ADL Assistive Devices: Shower Chair with back  Other: continue to assess    Assessment   Performance deficits / Impairments: Decreased functional mobility ; Decreased endurance;Decreased ADL status; Decreased safe awareness;Decreased cognition;Decreased fine motor control;Decreased ROM; Decreased strength  Assessment: Pt is not at baseline level of function and will benefit from continued OT to address the above limitations. Pt with improved ability to tolerate session this date, however, continues to require increased time and seted activity modification due to BP. Treatment Diagnosis: Debility and decreased ADLs due to chest pain, Parkinsons recent NSTEMI. Prognosis: Fair  History: Wife helps with ADLs and IADLs, furniture walks in home, Parkinsons  OT Education: OT Role;Plan of Care;ADL Adaptive Strategies;Transfer Training;Precautions  Patient Education: pt requires continued education, not independent in learning  Barriers to Learning: Cognition  REQUIRES OT FOLLOW UP: Yes  Activity Tolerance  Activity Tolerance: Treatment limited secondary to medical complications (free text); Patient limited by fatigue  Activity Tolerance: See above for BP readings. Safety Devices  Safety Devices in place: Yes  Type of devices: All fall risk precautions in place; Left in bed;Bed alarm in place;Call light within reach; Patient at risk for falls         Patient Diagnosis(es): Deepti Section      has a past medical history of CAD (coronary artery disease), COPD (chronic obstructive pulmonary disease) ([facility] [address] Coronary artery bypass, Coronary stent, Hyperlipidemia, Hypertension, Osteoarthritis, Parkinson disease ([facility] [address] Prostate cancer ([facility] [address] and Rotator cuff syndrome. has a past surgical history that includes Prostatectomy (2004); Colonoscopy (2005); Coronary artery bypass graft; Cardiac surgery (1998); Coronary angioplasty (1993); Coronary angioplasty (2013); Cataract removal with implant (2015); sigmoidoscopy (N/A, 1/25/2019); and Colonoscopy (N/A, 8/30/2019). Restrictions  Restrictions/Precautions  Restrictions/Precautions: Fall Risk, Modified Diet  Required Braces or Orthoses?: No  Position Activity Restriction  Other position/activity restrictions: 80-year-old gentleman with history of CAD status post PCI 1/8/2021 for NSTEMI with JAMES x2 to RCA is a direct admit from outside hospital with complaint of chest pain. Troponin is mildly elevated and stable. EKG is nonspecific and unchanged from prior. Cardiology evaluation complete and patient is cleared to go home from cardiology standpoint. Subjective   General  Chart Reviewed: Yes  Additional Pertinent Hx: CAD, CABG, COPD, HTN, prostate cancer, OA  Family / Caregiver Present: No  Diagnosis: Chest pain, debility  Subjective  Subjective: Pt lying supine in bed upon arrival. Pt appears irritable. Pt agreeable to therapy, became more pleseant. Pt requesting to shave. Vital Signs  Patient Currently in Pain: Denies  Patient Observation  Observations: Pt known to have (+) orthostatic hypotension. Performed BP assessment throughout treatment. Supine 127/73. EOB 94/61, pt asymptomatic. In w/c 94/59, pt with no complaints of dizziness. Unable to obtain standing BP. Orientation  Orientation  Overall Orientation Status: Within Functional Limits  Objective    ADL  Grooming: Minimal assistance; Increased time to complete;Verbal cueing(VC for sequencing.)  LE Dressing: Minimal assistance  Toileting: Minimal assistance  Additional Comments: Pt with no complaints of dizzness. All ADLs completed in seated or EOB.         Balance  Sitting Balance: Minimal assistance(1 LOB EOB)  Standing Balance: Contact guard assistance  Standing Balance  Time: x30 sec  Activity: transfer to chair, transfer to toliet  Comment: Did not attempt continued standing for ADLs due to pt's drop in BP, although pt was asymptomatic. Functional Mobility  Functional - Mobility Device: Wheelchair  Activity: To/from bathroom  Toilet Transfers  Toilet - Technique: Ambulating  Equipment Used: Grab bars  Toilet Transfer: Contact guard assistance  Bed mobility  Supine to Sit: Supervision  Sit to Supine: Supervision  Transfers  Sit to stand: Contact guard assistance  Stand to sit: Contact guard assistance                       Cognition  Overall Cognitive Status: Exceptions  Arousal/Alertness: Delayed responses to stimuli  Following Commands: Follows one step commands with repetition; Follows one step commands with increased time  Attention Span: Attends with cues to redirect; Difficulty attending to directions  Memory: Decreased recall of recent events;Decreased short term memory  Safety Judgement: Decreased awareness of need for safety;Decreased awareness of need for assistance  Problem Solving: Assistance required to implement solutions;Assistance required to generate solutions  Insights: Decreased awareness of deficits  Initiation: Requires cues for all  Sequencing: Requires cues for all  Cognition Comment: Pt required cues throughout grooming task to maintain attention. Additional activities: Pt left in bed with bed alarm activated and all needs within reach. Pt participated in grooming seated in w/c and toileting. Level of assist required as follows:   Grooming: SBA in seated  Toilet transfer: CGA with grab bars   Toileting: Min A for pericare in stance   LBD: Mod A   UBD: Min A   Cues required throughout for sequencing. BP at rest: 122/82, sitting EOB 88/54 asymptomatic, 88/58 after w/c transfer, asymptomatic.                      Plan   Plan  Times per week: 75 min/day, 5/7 days/week  Times per day: Daily  Current Treatment Recommendations: Strengthening, ROM, Functional Mobility Training, Endurance Training, Safety Education & Training, Self-Care / ADL, Equipment Evaluation, Education, & procurement, Patient/Caregiver Education & Training    Goals  Short term goals  Time Frame for Short term goals: 2 weeks  Short term goal 1: Supervision for bathing. not addressed  Short term goal 2: Mod I dressing. Not met progressing  Short term goal 3: Mod I toileting. Not met progressing  Short term goal 4: Mod I for functional transfers. Not met progressing  Short term goal 5: Mod I for functional mobility.  Not met progressing  Short term goal 6: Mod I grooming Not met progressing  Long term goals  Time Frame for Long term goals : STGs= LTGs  Patient Goals   Patient goals : Go home with wife       Therapy Time   Individual Concurrent Group Co-treatment   Time In 0930, 1400         Time Out 1015. 1430         Minutes 45, 30                Timed Code Treatment Minutes: 75 min   Total Treatment Minutes:  75 min        Jasmyn Leon, OT

## 2021-01-18 NOTE — PROGRESS NOTES
[id number] home care referral per acute care discharge planner. [facility] notified. Will follow.

## 2021-01-18 NOTE — PROGRESS NOTES
ACUTE REHAB UNIT  SPEECH/LANGUAGE PATHOLOGY      [x] Daily  [] Weekly Care Conference Note  [] Discharge    Deniz Spears     CFK:5/12/[birth year]6  DNI:[id number]  Room #: SBX-2047/1531-98   Rehab Dx/Hx: Zackery Roque [R53.81]  Date of Admit: 1/15/2021      Precautions: falls and aspirations  Home situation: Pt lives at home with multiple family members   ST Dx: Oropharyngeal dysphagia; ?cognitive linguistic deficits - further assessment recommended, pt agreeable      Daily Treatment Info:   Date: 1/18/2021   Tx session 1   Pain None reported    Subjective     Pt in bed for session due to orthostatic while working with PT this morning. He admitted to having hallucinations since hospital admission and acknowledged recent cognitive decline. He is willing to participate in further cognitive linguistic assessment, which will be completed tomorrow 1/19/21. Objective:  Goals     Pt will tolerate recommended diet level with no overt s/s aspiration   Thin liquid via bottle   - pt with consistent signs of aspiration on all presentations   - pt reported improved control with postural changes and use of 3 second bolus hold, however, signs of aspiration were persistent   - demonstrated overt signs of premature bolus loss, delayed swallow initiation, multiple (3-4) clearing swallows     Pt had consumed very small portion of his breakfast (2-3 bites of supplement - Magic Cup, 1 bite of dry ground sausage). - Pt endorsed significant weight loss and agreed contribution to his difficulty with swallowing   - Pt was limited in acceptance of solids despite encouragement   - Pt denied desire for any feeding tubes   - Pt denied any additional consistency restrictions, specifically for liquuids     Pt demonstrates HIGH risk for nutritional compromise AND aspiration      Pt will complete a variety of swallow maneuvers in order to improve swallow function.     Attempted forward head posture with thin liquids   - required mod-max physical assistance from SLP   - required repositioning in bed with min-mod assistance for upright positioning      Pt will recall and utilize safe swallow strategies in order to improve safety during po intake. Provided verbal education re importance of posture while eating/drinking. Provided verbal education re isolating liquid consistencies from soft solids to reduce risk for aspiration. Encouraged use of blended/pureed textures to clear globus sensation. Pt verbalized understanding of strategies however limited ability to implement during session. Other areas targeted:    Education:   See above re aspiration precautions and strategies provided. Pt verbalized understanding. Safety Devices: [] Call light within reach  [] Chair alarm activated  [x] Bed alarm activated  [] Other:     Assessment:        Current Diet Order: DIET DYSPHAGIA MINCED AND MOIST; No Caffeine, No Drinking Straw  Dietary Nutrition Supplements: Frozen Oral Supplement   Recommended Form of Meds: Crushed in puree as able  Compensatory Swallowing Strategies:  Alternate solids and liquids, Eat/Feed slowly, No straws, Upright as possible for all oral intake, Remain upright for 30-45 minutes after meals, Small bites/sips     Plan:     Frequency:  5days/week   30 minutes/day - additional time recommended for completion of speech, language, cognitive evaluation    Discharge Recommendations:   Barriers: Medical complications (dysphagia, aspiration risk, nutritional compromise)   Discharge Recommendations:  [] Home independently  [x] Home with assistance []  24 hour supervision  [] SNF [] Other:  Continued SLP Treatment:  [x] Yes [] No [] TBD based on progress while on ARU [] Vital Stim indicated [] Other:   Estimated discharge date: TBD     Type of Total Treatment Minutes   Session 1     Time In 0830   Time Out 0900   Timed Code Minutes     Individual Treatment Minutes  30   Co-Treatment Minutes     Group Treatment Minutes

## 2021-01-18 NOTE — CARE COORDINATION
SW attempted to see pt regarding his discharge plan. Patient requesting that this SW come at a later time. Will re-attempt as schedule and patient availability allow.     Electronically signed by MARYAN Espinosa on 1/18/2021 at 5:12 PM

## 2021-01-18 NOTE — PROGRESS NOTES
Physical Therapy  Facility/Department: [facility] ACUTE REHAB UNIT  Daily Treatment Note  NAME: Carly Bishop  : 1940  MRN: [id number]    Date of Service: 2021    Discharge Recommendations:  24 hour supervision or assist, 2-3 sessions per week, S Level 3   PT Equipment Recommendations  Equipment Needed: No  Other: TBD based on therapy progress    Assessment   Body structures, Functions, Activity limitations: Decreased functional mobility ; Decreased ROM; Decreased safe awareness;Decreased endurance;Decreased balance;Decreased posture;Decreased ADL status; Decreased strength  Assessment: Pt is significantly limited by symptomatic (+) orthostatic hypotension this session. Pt able to stand with CGA, but can only tolerate approx 45 sec of standing before feeling dizzy and weak and requiring seated rest break. Pt continues to show decreased functional mobility and will benefit from con't skilled PT to facilitate return home safely. Treatment Diagnosis: decreased functional mobility, impaired gait, decreased balance, decreased endurance  Prognosis: Good  PT Education: PT Role;Goals;Plan of Care;General Safety;Transfer Training;Energy Conservation;Gait Training;Functional Mobility Training  Patient Education: Patient verbalized understanding. REQUIRES PT FOLLOW UP: Yes  Activity Tolerance  Activity Tolerance: Patient Tolerated treatment well;Patient limited by fatigue;Patient limited by endurance;Treatment limited secondary to medical complications (free text)  Activity Tolerance: orthostatic hypotension limits patient's standing activity tolerance. Patient Diagnosis(es): There were no encounter diagnoses. has a past medical history of CAD (coronary artery disease), COPD (chronic obstructive pulmonary disease) ([facility] [address] Coronary artery bypass, Coronary stent, Hyperlipidemia, Hypertension, Osteoarthritis, Parkinson disease ([facility] [address] Prostate cancer ([facility] [address] and Rotator cuff syndrome.    has a past surgical history that includes Prostatectomy (2004); Colonoscopy (2005); Coronary artery bypass graft; Cardiac surgery (1998); Coronary angioplasty (1993); Coronary angioplasty (2013); Cataract removal with implant (2015); sigmoidoscopy (N/A, 1/25/2019); and Colonoscopy (N/A, 8/30/2019). Restrictions  Restrictions/Precautions  Restrictions/Precautions: Fall Risk, Modified Diet  Required Braces or Orthoses?: No  Position Activity Restriction  Other position/activity restrictions: 71-year-old gentleman with history of CAD status post PCI 1/8/2021 for NSTEMI with JAMES x2 to RCA is a direct admit from outside hospital with complaint of chest pain. Troponin is mildly elevated and stable. EKG is nonspecific and unchanged from prior. Cardiology evaluation complete and patient is cleared to go home from cardiology standpoint. Subjective   General  Chart Reviewed: Yes  Family / Caregiver Present: No  Subjective  Subjective: Pt reports that he did not sleep well, but willing to participate in PT. General Comment  Comments: Pt supine in bed, RN BODØ present upon arrival.  Pain Screening  Patient Currently in Pain: Denies  Vital Signs  Patient Currently in Pain: Denies  Patient Observation  Observations: Pt known to have (+) orthostatic hypotension. Performed BP assessment throughout treatment. Supine 129/81. Sitting 75/49. Unable to obtain standing BP. Sitting after ther ex 74/41. Unable to obtain supine BP. Sitting 78/53. Unable to obtain standing BP. Pt is symptomatic throughout session. Able to tolerate standing x 45 sec before feeling dizzy and weak. Discussed with Dr. Christiana Wright. Orientation  Orientation  Overall Orientation Status: Within Functional Limits  Cognition      Objective   Bed mobility  Supine to Sit: Supervision  Sit to Supine: Supervision  Scooting: Supervision(verbal cues for scooting towards HOB towards R)  Comment: Performed supine <> sit x 3 reps.  Pt required supine rest breaks due to (+) orthostatic hypotension. Transfers  Sit to Stand: Contact guard assistance(able to tolerate 45 sec of standing before needing to sit)  Stand to sit: Contact guard assistance  Ambulation  Ambulation?: No(unable to tolerate during AM session)     Balance  Posture: Poor(increased cervicothoracic kyphosis)  Sitting - Static: Good  Sitting - Dynamic: Good(pt constantly crosses leg in sitting; cued to place B LEs on floor for increased stability)  Standing - Static: Fair  Standing - Dynamic: Fair  Exercises  Hip Flexion: seated x 10 reps B LEs x 2 sets  Knee Short Arc Quad: seated x 10 reps B LEs x 2 sets  Ankle Pumps: seated x 15 reps B LEs x 2 sets  Comments: seated hip add x 10 reps x 1 set         Comment: 2nd session: Pt is supine in bed, asleep. Reports that does not have any additional energy from morning session and does not have an appetite. Continued monitoring of BP. Supine 117/73. Supine < sit with SPV. Seated BP 76/45. Pt symptomatic and tries to lie down. Performed seated exercises including seated hip flex x 10 reps, hip add x 10 reps, ankle pumps x 10 reps. Pt tolerated well. BP 72/44 after exercises. Sit < stand with CGA. Immediately reported dizziness. Attempted BP and did not read. Seated BP 88/57. Pt requested to lay down. Performed supine LE exer: heel slides x 10 reps, hip abd x 10 reps, ankle pumps x 10 reps. /63 after exer. Pt left in bed with call bed, bed alarm on. Goals  Short term goals  Time Frame for Short term goals: 7-14 days  Short term goal 1: Patient will perform bed mobility independently. Short term goal 2: Patient will perform bed-chair transfer MOD I w/ LRAD. Short term goal 3: Patient will ambulate 150' MOD I w/ LRAD. Short term goal 4: Patient will negotiate up/down 12 stairs w/ single railing MOD I to access 2nd floor kitchen.   Patient Goals   Patient goals : to get home    Plan    Plan  Times per week: 75 minutes/day, 5 days/week  Current Treatment

## 2021-01-19 PROCEDURE — 97530 THERAPEUTIC ACTIVITIES: CPT

## 2021-01-19 PROCEDURE — 97535 SELF CARE MNGMENT TRAINING: CPT

## 2021-01-19 PROCEDURE — 6370000000 HC RX 637 (ALT 250 FOR IP): Performed by: PHYSICAL MEDICINE & REHABILITATION

## 2021-01-19 PROCEDURE — 6360000002 HC RX W HCPCS: Performed by: PHYSICAL MEDICINE & REHABILITATION

## 2021-01-19 PROCEDURE — 2580000003 HC RX 258: Performed by: PHYSICAL MEDICINE & REHABILITATION

## 2021-01-19 PROCEDURE — 1280000000 HC REHAB R&B

## 2021-01-19 PROCEDURE — 92523 SPEECH SOUND LANG COMPREHEN: CPT

## 2021-01-19 PROCEDURE — 94760 N-INVAS EAR/PLS OXIMETRY 1: CPT

## 2021-01-19 PROCEDURE — 97110 THERAPEUTIC EXERCISES: CPT

## 2021-01-19 RX ORDER — SODIUM CHLORIDE 9 MG/ML
INJECTION, SOLUTION INTRAVENOUS ONCE
Status: COMPLETED | OUTPATIENT
Start: 2021-01-19 | End: 2021-01-19

## 2021-01-19 RX ORDER — CARVEDILOL 3.12 MG/1
3.12 TABLET ORAL 2 TIMES DAILY WITH MEALS
Status: DISCONTINUED | OUTPATIENT
Start: 2021-01-19 | End: 2021-02-02 | Stop reason: HOSPADM

## 2021-01-19 RX ADMIN — MIDODRINE HYDROCHLORIDE 5 MG: 5 TABLET ORAL at 08:16

## 2021-01-19 RX ADMIN — FAMOTIDINE 20 MG: 20 TABLET, FILM COATED ORAL at 20:32

## 2021-01-19 RX ADMIN — SODIUM CHLORIDE: 9 INJECTION, SOLUTION INTRAVENOUS at 10:00

## 2021-01-19 RX ADMIN — MIDODRINE HYDROCHLORIDE 5 MG: 5 TABLET ORAL at 12:39

## 2021-01-19 RX ADMIN — CIPROFLOXACIN 500 MG: 500 TABLET, FILM COATED ORAL at 20:32

## 2021-01-19 RX ADMIN — ASPIRIN 81 MG: 81 TABLET, CHEWABLE ORAL at 08:17

## 2021-01-19 RX ADMIN — ENOXAPARIN SODIUM 40 MG: 40 INJECTION SUBCUTANEOUS at 08:17

## 2021-01-19 RX ADMIN — CARBIDOPA AND LEVODOPA 1 TABLET: 25; 100 TABLET, ORALLY DISINTEGRATING ORAL at 20:32

## 2021-01-19 RX ADMIN — FAMOTIDINE 20 MG: 20 TABLET, FILM COATED ORAL at 08:25

## 2021-01-19 RX ADMIN — MIDODRINE HYDROCHLORIDE 5 MG: 5 TABLET ORAL at 18:13

## 2021-01-19 RX ADMIN — CARBIDOPA AND LEVODOPA 1 TABLET: 25; 100 TABLET, ORALLY DISINTEGRATING ORAL at 08:25

## 2021-01-19 RX ADMIN — CARBIDOPA AND LEVODOPA 1 TABLET: 25; 100 TABLET, ORALLY DISINTEGRATING ORAL at 14:57

## 2021-01-19 RX ADMIN — Medication 10 ML: at 20:31

## 2021-01-19 RX ADMIN — CLOPIDOGREL 75 MG: 75 TABLET, FILM COATED ORAL at 08:17

## 2021-01-19 RX ADMIN — CIPROFLOXACIN 500 MG: 500 TABLET, FILM COATED ORAL at 08:17

## 2021-01-19 RX ADMIN — TRAMADOL HYDROCHLORIDE 50 MG: 50 TABLET, FILM COATED ORAL at 15:35

## 2021-01-19 RX ADMIN — FLUDROCORTISONE ACETATE 0.2 MG: 0.1 TABLET ORAL at 08:25

## 2021-01-19 RX ADMIN — ATORVASTATIN CALCIUM 20 MG: 20 TABLET, FILM COATED ORAL at 20:32

## 2021-01-19 RX ADMIN — CARVEDILOL 3.12 MG: 3.12 TABLET, FILM COATED ORAL at 18:13

## 2021-01-19 ASSESSMENT — PAIN SCALES - WONG BAKER
WONGBAKER_NUMERICALRESPONSE: 0

## 2021-01-19 ASSESSMENT — PAIN SCALES - GENERAL
PAINLEVEL_OUTOF10: 0
PAINLEVEL_OUTOF10: 5

## 2021-01-19 NOTE — PROGRESS NOTES
ANNEMARIE, unsafe to attempt  Assessment        SLP  Current Diet : Dysphagia Minced and Moist (Dysphagia II)  Current Liquid Diet : Thin  Diet Solids Recommendation: Dysphagia Minced and Moist (Dysphagia II)  Liquid Consistency Recommendation: Thin    Body mass index is 16.31 kg/m². Assessment:  Patient Active Problem List   Diagnosis    COPD (chronic obstructive pulmonary disease)    Coronary artery disease    Hypertension    Hyperlipidemia    Fever    SOB (shortness of breath)    Vertigo    Exertional dyspnea    Paroxysmal atrial fibrillation (HCC)    Parkinson disease ([facility] [address]    Hypotension    Chest pain    Severe malnutrition ([facility] [address]    NSTEMI (non-ST elevated myocardial infarction) ([facility] [address]    Debility       Plan:   Debility: Complicated by Parkinson. PT/OT     Dysphagia: dysphagia diet, SLP     CAD: s/p CABG and recent PCI. Coreg 6.25 -> 3.125     Parkinson: Sinemet per home regimen     Orthostasis: midodrine increased to 5, Florinef 0.2. - IVF bolus and continuous following for total of 1L. - d/c PRN hydral     HLD: Lipitor 20     COPD: No current meds     Iron deficiency anemia: s/p supplementation     UTI: cipro     Bowels: Per protocol  Bladder: Per protocol   Sleep: Trazodone provided prn. Pain: Tylenol, tramadol  DVT PPx: Lovenox    Medical therapy hold for this am. Attempt therapies this afternoon if BP appropriate. Team conference was held today on the patient and discussed directly with the patient utilizing their entire treatment team. Please see separate team note for details. Total treatment time for today's care >33 min. Davida Graham MD 1/19/2021, 9:31 AM    * This document was created using dictation software. While all precautions were taken to ensure accuracy, errors may have occurred. Please disregard any typographical errors.

## 2021-01-19 NOTE — CARE COORDINATION
Social Work Admission Assessment    Objective:  Met with pt/spouse to complete initial assessment and review role of  in rehab process. Pt oriented to unit. Pt states understanding of this. Current Home Situation:  Patient lives at home with is spouse, adult daughter and his grandson. Patient's spouse reports that patient's daughter provides much support to she and patient. Pt's plans re:  Return to work/school/volunteer: Patient is retired. Accessibility to community resources/transportation:  Patient referred to [facility] while in acute care./Patient's family will provide patient transportation at discharge. Has pt experienced a recent loss or signigicant life event that would impact their care or ability to participate?  x__No  __Yes - Explain    Has pt ever been treated for emotional disorders? _x_No  __Yes--How does that affect current situation:    How does pt and family cope with stressful events and this hospitalization? Patient spouse reports that patient sneha with stress appropriately. Special Problem Areas:  None    Discharge Plan: Home with appropriate support. Has been referred to [facility]. Impression/Plan: Nisa Powers (patient )is a 79year old male/female that has been admitted to ARU. Provided patient with this SW's card to contact as needed. Will continue to follow for support and discharge planning.      Electronically signed by MARYAN Angel on 1/19/2021 at 4:29 PM

## 2021-01-19 NOTE — PROGRESS NOTES
D:  ml bolus ordered. Pt sleeping. Respirations easy. Responds to voice. B/P 90/50's x 3 with interval B/P set for every 30 minutes. A: IV started and bolus started pre order. R: Will follow the bolus with NS/100 hr x 500 ml. Will continue to monitor.

## 2021-01-19 NOTE — PROGRESS NOTES
Occupational Therapy  Facility/Department: [facility] ACUTE REHAB UNIT  Daily Treatment Note  NAME: Monica Vazquez  : 1940  MRN: [id number]    Date of Service: 2021    Discharge Recommendations:  Home with Home health OT, 24 hour supervision or assist, S Level 3  OT Equipment Recommendations  Equipment Needed: Yes  Mobility Devices: ADL Assistive Devices  Walker: Rolling  ADL Assistive Devices: Shower Chair with back  Other: continue to assess    Assessment   Performance deficits / Impairments: Decreased functional mobility ; Decreased endurance;Decreased ADL status; Decreased safe awareness;Decreased cognition;Decreased fine motor control;Decreased ROM; Decreased strength  Assessment: Pt is not at baseline level of function and will benefit from continued OT to address the above limitations. Pt with improved ability to tolerate session this date, however, continues to require increased time and seted activity modification due to BP. Treatment Diagnosis: Debility and decreased ADLs due to chest pain, Parkinsons recent NSTEMI. Prognosis: Fair  OT Education: OT Role;Plan of Care;ADL Adaptive Strategies;Transfer Training;Precautions  Patient Education: pt requires continued education, not independent in learning  Barriers to Learning: Cognition  REQUIRES OT FOLLOW UP: Yes  Activity Tolerance  Activity Tolerance: Treatment limited secondary to medical complications (free text); Patient limited by fatigue  Activity Tolerance: pt remained supine in bed this date d/t orthostatic hypotensive symptoms BP while supine with IV medication and after completing exercises : 119/42 HR 66  Safety Devices  Safety Devices in place: Yes  Type of devices: Left in bed;Bed alarm in place;Call light within reach; Patient at risk for falls; All fall risk precautions in place;Nurse notified         Patient Diagnosis(es): There were no encounter diagnoses.       has a past medical history of CAD (coronary artery disease), COPD (chronic obstructive pulmonary disease) ([facility] [address] Coronary artery bypass, Coronary stent, Hyperlipidemia, Hypertension, Osteoarthritis, Parkinson disease ([facility] [address] Prostate cancer ([facility] [address] and Rotator cuff syndrome. has a past surgical history that includes Prostatectomy (2004); Colonoscopy (2005); Coronary artery bypass graft; Cardiac surgery (1998); Coronary angioplasty (1993); Coronary angioplasty (2013); Cataract removal with implant (2015); sigmoidoscopy (N/A, 1/25/2019); and Colonoscopy (N/A, 8/30/2019). Restrictions  Restrictions/Precautions  Restrictions/Precautions: Fall Risk, Modified Diet(high fall risk, up with assistance, diet dysphagia minced and moist - no caffeine, no drinking straw, small sips, meds in puree)  Required Braces or Orthoses?: No  Position Activity Restriction  Other position/activity restrictions: 67-year-old gentleman with history of CAD status post PCI 1/8/2021 for NSTEMI with JAMES x2 to RCA is a direct admit from outside hospital with complaint of chest pain. Troponin is mildly elevated and stable. EKG is nonspecific and unchanged from prior. Cardiology evaluation complete and patient is cleared to go home from cardiology standpoint. Subjective   General  Chart Reviewed:  Yes  Additional Pertinent Hx: CAD, CABG, COPD, HTN, prostate cancer, OA  Family / Caregiver Present: No  Diagnosis: Chest pain, debility  Subjective  Subjective: Pt lying supine in bed upon arrival, agreeable to OT PM treatment session stating \"im starting to feel much better than I did a while ago\"  Vital Signs  Patient Currently in Pain: Denies     Orientation  Orientation  Overall Orientation Status: Within Functional Limits    Objective    ADL  Feeding: Setup(assist to setup lunch this date)  Grooming: Increased time to complete;Verbal cueing;Stand by assistance;Setup(completed oral care and hair combing while supine in bed; assistance from L hand to maintain RUE in shoulder flexion while combing hair)  UE Bathing: Supervision;Setup(washed hair and face with use of shower cap)  Additional Comments: Pt declined additional ADLs this date but stating he would like to shave in tomorrow's session  Standing Balance  Comment: Did not attempt continued standing for ADLs due orthostatic hypotension  Functional Mobility  Functional Mobility Comments: ANNEMARIE, unsafe to complete  Transfers  Transfer Comments: did not assess  Cognition  Overall Cognitive Status: Exceptions  Arousal/Alertness: Delayed responses to stimuli  Following Commands: Follows one step commands with repetition; Follows one step commands with increased time  Attention Span: Attends with cues to redirect; Difficulty attending to directions  Memory: Decreased recall of recent events;Decreased short term memory  Safety Judgement: Decreased awareness of need for safety;Decreased awareness of need for assistance  Problem Solving: Assistance required to implement solutions;Assistance required to generate solutions  Insights: Decreased awareness of deficits  Initiation: Requires cues for some  Sequencing: Requires cues for some  Type of ROM/Therapeutic Exercise  Type of ROM/Therapeutic Exercise: AROM; Free weights  Comment: use of 1# free weight and completed while supine in bed at 45 degree angle  Exercises  Shoulder Flexion: 2x10  Shoulder Extension: 2x10  Shoulder ABduction: AROM 2x10  Shoulder ADduction: AROM 2x10  Horizontal ABduction: AROM 2x10  Horizontal ADduction: AROM 2x10  Other: forward/backward row with use of 1# weight 2x10  LUE AROM (degrees)  LUE AROM : WFL  LUE General AROM: approx 0-120 degrees shoulder flexion  Left Hand AROM (degrees)  Left Hand AROM: WFL  Left Hand General AROM: @ 0-90 degrees shoulder flexion     AM Session:  Pt in recliner chair upon arrival, pleasant and agreeable to OT treatment session.  BP taken at SOS and at 70/34  HR 80 and pt reporting \"I feel very dizzy, I feel like I am going to pass out\", RN called and aware and pt assisted back to bed with CGA. Pt completed supine>sit transfer with CGA d/t dizziness. RN arrived and pt put into Trendelenburg positioning, BP taken again and pt at 79/49 HR 71. MD notified and pt placed on medical hold for AM session per MD.      Plan   Plan  Times per week: 5-7x/wk, 75 min  Times per day: Daily  Current Treatment Recommendations: Strengthening, ROM, Functional Mobility Training, Endurance Training, Safety Education & Training, Self-Care / ADL, Equipment Evaluation, Education, & procurement, Patient/Caregiver Education & Training, Balance Training    G-Code     OutComes Score                                                  AM-PAC Score             Goals  Short term goals  Time Frame for Short term goals: 2 weeks  Short term goal 1: Supervision for bathing- not met, progressing  Short term goal 2: Mod I dressing- not met, progressing  Short term goal 3: Mod I toileting- Not met progressing  Short term goal 4: Mod I for functional transfers- Not met progressing  Short term goal 5: Mod I for functional mobility- Not met progressing  Short term goal 6: Mod I grooming- Not met progressing  Long term goals  Time Frame for Long term goals : STGs= LTGs  Patient Goals   Patient goals : Go home with wife       PM Session Therapy Time   Individual Concurrent Group Co-treatment   Time In 1130         Time Out 1200         Minutes 30         Timed Code Treatment Minutes: 30 Minutes     AM Session Therapy Time:   Individual Concurrent Group Co-treatment   Time In 0900         Time Out 0920         Minutes 20           Timed Code Treatment Minutes:  30 + 20     Total Treatment Minutes:  50 minutes total    Minute Variance: 25 minutes d/t AM medical hold from orthostatic hypotension symptoms.      Bhaskar Adames, [address] [place] [id number]

## 2021-01-19 NOTE — PROGRESS NOTES
Speech Language Pathology  Facility/Department: [facility] ACUTE REHAB UNIT  Initial Speech/Language/Cognitive Assessment    NAME: Dayanara Mustafa  : 1940   MRN: [id number]  ADMISSION DATE: 1/15/2021   Date of Eval: 2021   Evaluating Therapist: BENJI Fournier    ADMITTING DIAGNOSIS:   ARU H&P: 71-year-old male with a history of COPD, Parkinson, prostate cancer, HTN, HLD, and CAD status post recent PCI and discharged on . Catalino Eldridge was readmitted on  with chest pain.  Repeat cardiac work-up was performed and cardiology suggested no further intervention at this time. Catalino Eldridge was evaluated by therapy and suggested to continue in an inpatient setting prior to returning home. Catalino Eldridge reports feeling diffusely weak and feeling like his hospitalizations have led to increased muscle wasting.  He also reports chronic right shoulder pain and difficulty raising his right arm. No other complaints today. Subjective:   Subjective: Speech Language Evaluation was initiated secondary to pt's complaints of recent difficulty with memory and hallucinations since hospitalization. Pt's wife in room at the beginning of the session. Wife left before beginning the evaluation. Pt with hypotensive episode this morning, he was on hold for morning therapy but able to participate this afternoon while in bed. Assessment:  Cognitive Diagnosis: Pt presents with mild-moderate cognitive deficits in the domains of orientation, thought organization, short term memory, and working memory. Speech Diagnosis: Pt presents with moderate dysarthria charatcerized by reduced breath support, vocal intensity, and articulatory precision. Recommendations:  Requires SLP Intervention: Yes  Duration/Frequency of Treatment: 30 minutes a day, 5 times a week until goals met or d/c. D/C Recommendations:  To be determined    Plan:   Goals:  Short-term Goals  Goal 1: Pt will improve vocal volume and use LOUD speech across graded tasks with 80% accuracy and Thought: Mild    Additional Assessments:      Orientation 1/3 Disoriented to year (I.e. 2001) and [place] (I.e. Monday)   Working Memory 2/5 x2    Basic Calculations 1/3 Required repetition of the word problem x2    Divergent Naming 2/3 Pt named 10 animals in 1 minute   Short term memory 2/5 Improved to 5/5 with category/choice cues    Mental Manipulation 2/2    Executive function task (clock drawing) 0/4 Inaccurate number and hand placement. Auditory comprehension of 1 step directions 2/2    Auditory comprehension of short detailed paragraph 8/8      The [facility] Mental Status (UMS) Examination administered to pt with pt scoring 20/30 falling within the Dementia rage (1-20). * Of note, Pt does not have a formal dementia diagnosis. The above score and cognitive stage is not indicative of diagnosis rather used to direct treatment tasks and establish goals. Prognosis:  Speech Therapy Prognosis  Prognosis: Fair  Individuals consulted  Consulted and agree with results and recommendations: Patient    Education:  Patient Education: Provided education re rationale for SLP evaluation and plan of care. Patient Education Response: Verbalizes understanding  Safety Devices in place: Yes  Type of devices: All fall risk precautions in place; Left in bed;Bed alarm in place     Pain:  Pain Assessment  Pain Assessment: 0-10  Pain Level: 0  Alvarez-Baker Pain Rating: No hurt  Patient's Stated Pain Goal: No pain      Therapy Time:   Individual Concurrent Group Co-treatment   Time In 1315         Time Out 1345         Minutes 30                Signature:   Janusz Chowdhury M.A.  CCC-SLP S.P. [id number]  Speech-Language Pathologist [phone]  1/19/2021 3:35 PM

## 2021-01-19 NOTE — PROGRESS NOTES
D: Patient's grandsonHeber Dear called for an update. A: Shared details of the patient's day including his issues with vacillating blood pressure and treatment interventions. R: Grandson v/u of information shared and POC. Informed patient that Heber Dear called and asked about him.

## 2021-01-19 NOTE — PROGRESS NOTES
Physical Therapy  Facility/Department: [facility] ACUTE REHAB UNIT  Daily Treatment Note  NAME: George Ricardo  : 1940  MRN: [id number]    Date of Service: 2021    Discharge Recommendations:  24 hour supervision or assist, 2-3 sessions per week, S Level 3   PT Equipment Recommendations  Other: TBD based on therapy progress    Assessment   Body structures, Functions, Activity limitations: Decreased functional mobility ; Decreased ROM; Decreased safe awareness;Decreased endurance;Decreased balance;Decreased posture;Decreased ADL status; Decreased strength  Assessment: Patient remains limited by low BP with mobility, slightly improved by second PT session this afternoon after medical hold for PT in am.  Patient BP drops with standing but able to tolerate sitting at this time. Patient requires (A) for all safe standing mobility. Patient will continue to benefit from additional skilled PT intervention to facilitate safe mobility and to optimize (I) to promote return to prior level of function. Treatment Diagnosis: impaired functional mobility  Prognosis: Good;Guarded(secondary to orthostatic BP)  Patient Education: Patient educated on role of PT, use of call light, PT recommendations - patient verbalizes understanding but may benefit from additional reinforcement  Barriers to Learning: none  REQUIRES PT FOLLOW UP: Yes  Activity Tolerance  Activity Tolerance: Patient limited by fatigue;Patient limited by endurance;Treatment limited secondary to medical complications (free text)  Activity Tolerance: orthostatic hypotension limits patient's standing activity tolerance. Patient Diagnosis(es): There were no encounter diagnoses. has a past medical history of CAD (coronary artery disease), COPD (chronic obstructive pulmonary disease) ([facility] [address] Coronary artery bypass, Coronary stent, Hyperlipidemia, Hypertension, Osteoarthritis, Parkinson disease ([facility] [address] Prostate cancer ([facility] [address] and Rotator cuff syndrome.    has a past surgical history that includes Prostatectomy (2004); Colonoscopy (2005); Coronary artery bypass graft; Cardiac surgery (1998); Coronary angioplasty (1993); Coronary angioplasty (2013); Cataract removal with implant (2015); sigmoidoscopy (N/A, 1/25/2019); and Colonoscopy (N/A, 8/30/2019). Restrictions  Restrictions/Precautions  Restrictions/Precautions: Fall Risk, Modified Diet(high fall risk, up with assistance, diet dysphagia minced and moist - no caffeine, no drinking straw, small sips, meds in puree)  Required Braces or Orthoses?: No  Position Activity Restriction  Other position/activity restrictions: 67-year-old gentleman with history of CAD status post PCI 1/8/2021 for NSTEMI with JAMES x2 to RCA is a direct admit from outside hospital with complaint of chest pain. Troponin is mildly elevated and stable. EKG is nonspecific and unchanged from prior. Cardiology evaluation complete and patient is cleared to go home from cardiology standpoint. Subjective   General  Chart Reviewed: Yes  Family / Caregiver Present: No  Referring Practitioner: MD Evette  Subjective  Subjective: Patient denies pain, requesting to sit up to shave upon PT entry, unable to redirect to alternative activity. General Comment  Comments: Patient attempting to sit edge of bed upon arrival - bed alarm not yet sounding, no additional (A) present, no call light on.           Orientation  Orientation  Overall Orientation Status: Within Functional Limits(talkative and appropriate during session)    Objective   Bed mobility  Supine to Sit: Supervision(with elevated head of bed and use of rail)  Sit to Supine: Supervision(with use of rail)  Scooting: Stand by assistance(seated at edge of bed)  Comment: BP arrival, supine 152/79; after stand pivot transfer to w/c 87/53, after seated in w/c x 15 minutes 123/78, supine 129/78 at end of session - RN notified  Transfers  Sit to Stand: Minimal Assistance(to RW at edge of bed)  Stand to sit: Minimal Assistance(from RW)  Stand Pivot Transfers: Minimal Assistance(with RW bed -> w/c; or without RW w/c -> bed)        Balance  Posture: Fair(forward flexed, rounded shoulders, forward head)  Sitting - Static: Good  Sitting - Dynamic: Good  Standing - Static: Fair  Standing - Dynamic: Fair;-         Comment: PT assisted patient to bathroom sink via w/c to allow patient to shave per adamant request while sitting in w/c - PT monitored throughout for signs/symptoms/c/o lightheadedness or dizziness - denies throughout     2nd session: pt in bed at arrival, denied pain, agreed to therapy. Supine /69, seated 81/44 but asymptomatic. Performed seated therex x 20 reps B: Alt LAQ, B HR, add pillow sq, alt marches, manually resisted abd and HS curls. Following therex /65. STS to RW with CGA. Stood at [employer] x 2 minutes with tc/vc for upright posture (slowly flexing due to back pain). Slow onset of dizziness. BP in standing prior to sitting 55/40, once sitting 77/30, then supine 113/67. Bed mobility formed with supervision. Left in bed, alarm on and needs in reach. Goals  Short term goals  Time Frame for Short term goals: 7-14 days  Short term goal 1: Patient will perform bed mobility independently. Short term goal 2: Patient will perform bed-chair transfer MOD I w/ LRAD. Short term goal 3: Patient will ambulate 150' MOD I w/ LRAD. Short term goal 4: Patient will negotiate up/down 12 stairs w/ single railing MOD I to access 2nd floor kitchen.   Patient Goals   Patient goals : to get home    Plan    Plan  Times per week: 75 minutes/day, 5 days/week  Current Treatment Recommendations: Strengthening, Balance Training, Functional Mobility Training, Transfer Training, Gait Training, Stair training, Home Exercise Program, Safety Education & Training, Endurance Training, Patient/Caregiver Education & Training, Equipment Evaluation, Education, & procurement, Positioning  Safety Devices  Type of devices: Bed alarm in place, Call light within reach, Gait belt, Left in bed, Telesitter in use, Nurse notified  Restraints  Initially in place: No     Therapy Time   Individual Concurrent Group Co-treatment   Time In 1407         Time Out 1450         Minutes 37              Second Session Therapy Time: (documented following later session)   Individual Concurrent Group Co-treatment   Time In 1245         Time Out 1315         Minutes 30           Timed Code Treatment Minutes:  73    Total Treatment Minutes:  73    Variance x 2 minutes - medical hold in am secondary to low BP    If patient discharges prior to next treatment, this note will serve as discharge summary.     Reta Chua PT, DPT #[id number]      2nd session completed by Ross Zacarias, DPT [id number]

## 2021-01-19 NOTE — CARE COORDINATION
Team conference held today. Spoke with patient's spouse to discuss progress with therapy, barriers to discharge, and plans to return home. Estimated discharge date set for 2/02/2021. Patient discharge plan will be determine based on his therapy progress. Will continue to follow for support and discharge planning.     Electronically signed by MARYAN Zhang on 1/19/2021 at 4:33 PM

## 2021-01-19 NOTE — PROGRESS NOTES
Physical Therapy  Rica Lopez    Chart reviewed. PT attempted to see patient for scheduled follow up treatment - per RN, BP remains low, starting IV fluids, patient c/o lightheaded. RN requesting PT to hold treatment at this time. Plan to continue to follow and reattempt as appropriate. MD aware.     Karlee Strong PT, DPT [id number]

## 2021-01-20 LAB
ANION GAP SERPL CALCULATED.3IONS-SCNC: 10 MMOL/L (ref 3–16)
BUN BLDV-MCNC: 25 MG/DL (ref 7–20)
CALCIUM SERPL-MCNC: 8.8 MG/DL (ref 8.3–10.6)
CHLORIDE BLD-SCNC: 104 MMOL/L (ref 99–110)
CO2: 26 MMOL/L (ref 21–32)
CREAT SERPL-MCNC: 1 MG/DL (ref 0.8–1.3)
GFR AFRICAN AMERICAN: >60
GFR NON-AFRICAN AMERICAN: >60
GLUCOSE BLD-MCNC: 91 MG/DL (ref 70–99)
HCT VFR BLD CALC: 29.4 % (ref 40.5–52.5)
HEMOGLOBIN: 9.6 G/DL (ref 13.5–17.5)
MCH RBC QN AUTO: 29.3 PG (ref 26–34)
MCHC RBC AUTO-ENTMCNC: 32.8 G/DL (ref 31–36)
MCV RBC AUTO: 89.3 FL (ref 80–100)
PDW BLD-RTO: 15.2 % (ref 12.4–15.4)
PLATELET # BLD: 169 K/UL (ref 135–450)
PMV BLD AUTO: 8.2 FL (ref 5–10.5)
POTASSIUM SERPL-SCNC: 3.7 MMOL/L (ref 3.5–5.1)
RBC # BLD: 3.29 M/UL (ref 4.2–5.9)
SODIUM BLD-SCNC: 140 MMOL/L (ref 136–145)
WBC # BLD: 7.7 K/UL (ref 4–11)

## 2021-01-20 PROCEDURE — 97116 GAIT TRAINING THERAPY: CPT

## 2021-01-20 PROCEDURE — 80048 BASIC METABOLIC PNL TOTAL CA: CPT

## 2021-01-20 PROCEDURE — 97530 THERAPEUTIC ACTIVITIES: CPT

## 2021-01-20 PROCEDURE — 97110 THERAPEUTIC EXERCISES: CPT

## 2021-01-20 PROCEDURE — 36415 COLL VENOUS BLD VENIPUNCTURE: CPT

## 2021-01-20 PROCEDURE — 85027 COMPLETE CBC AUTOMATED: CPT

## 2021-01-20 PROCEDURE — 1280000000 HC REHAB R&B

## 2021-01-20 PROCEDURE — 97535 SELF CARE MNGMENT TRAINING: CPT

## 2021-01-20 PROCEDURE — 6370000000 HC RX 637 (ALT 250 FOR IP): Performed by: PHYSICAL MEDICINE & REHABILITATION

## 2021-01-20 PROCEDURE — 2580000003 HC RX 258: Performed by: PHYSICAL MEDICINE & REHABILITATION

## 2021-01-20 PROCEDURE — 92526 ORAL FUNCTION THERAPY: CPT

## 2021-01-20 PROCEDURE — 97129 THER IVNTJ 1ST 15 MIN: CPT

## 2021-01-20 PROCEDURE — 92507 TX SP LANG VOICE COMM INDIV: CPT

## 2021-01-20 RX ORDER — MIDODRINE HYDROCHLORIDE 5 MG/1
10 TABLET ORAL
Status: DISCONTINUED | OUTPATIENT
Start: 2021-01-20 | End: 2021-01-23

## 2021-01-20 RX ADMIN — Medication 10 ML: at 09:45

## 2021-01-20 RX ADMIN — CLOPIDOGREL 75 MG: 75 TABLET, FILM COATED ORAL at 09:42

## 2021-01-20 RX ADMIN — CARBIDOPA AND LEVODOPA 1 TABLET: 25; 100 TABLET, ORALLY DISINTEGRATING ORAL at 22:02

## 2021-01-20 RX ADMIN — CARBIDOPA AND LEVODOPA 1 TABLET: 25; 100 TABLET, ORALLY DISINTEGRATING ORAL at 14:21

## 2021-01-20 RX ADMIN — MIDODRINE HYDROCHLORIDE 5 MG: 5 TABLET ORAL at 09:42

## 2021-01-20 RX ADMIN — FAMOTIDINE 20 MG: 20 TABLET, FILM COATED ORAL at 09:42

## 2021-01-20 RX ADMIN — MIDODRINE HYDROCHLORIDE 5 MG: 5 TABLET ORAL at 12:43

## 2021-01-20 RX ADMIN — ATORVASTATIN CALCIUM 20 MG: 20 TABLET, FILM COATED ORAL at 22:01

## 2021-01-20 RX ADMIN — FLUDROCORTISONE ACETATE 0.2 MG: 0.1 TABLET ORAL at 09:41

## 2021-01-20 RX ADMIN — FAMOTIDINE 20 MG: 20 TABLET, FILM COATED ORAL at 22:01

## 2021-01-20 RX ADMIN — CARBIDOPA AND LEVODOPA 1 TABLET: 25; 100 TABLET, ORALLY DISINTEGRATING ORAL at 09:41

## 2021-01-20 RX ADMIN — CARVEDILOL 3.12 MG: 3.12 TABLET, FILM COATED ORAL at 17:46

## 2021-01-20 RX ADMIN — ASPIRIN 81 MG: 81 TABLET, CHEWABLE ORAL at 09:42

## 2021-01-20 ASSESSMENT — PAIN SCALES - WONG BAKER
WONGBAKER_NUMERICALRESPONSE: 0
WONGBAKER_NUMERICALRESPONSE: 0

## 2021-01-20 ASSESSMENT — PAIN SCALES - GENERAL
PAINLEVEL_OUTOF10: 0
PAINLEVEL_OUTOF10: 0

## 2021-01-20 NOTE — PROGRESS NOTES
Patient was found up by RN trying to get out of bed on his own. Patient bed alarm was on, and has a cathy sys cam in room. Toileting and tia care provided, and a complete linen change done.  Electronically signed by Kyra Hughes RN on 1/20/2021 at 6:51 PM

## 2021-01-20 NOTE — PROGRESS NOTES
Comprehensive Nutrition Assessment    Type and Reason for Visit:  Reassess    Nutrition Recommendations/Plan:   Add Boost pudding BID  Monitor wt status  Begin calorie count    Nutrition Assessment:  Pt's PO intake variable over the past few days. Observed pt's breakfast tray with PO intake minimal, pt consumed about 25% of magic cup. Pt needs increased encouragement at mealtimes. Spoke with SLP regarding Ensure Enlive bijal to consistency of liquid. Will trial Boost pudding. Malnutrition Assessment:  Malnutrition Status:  Severe malnutrition    Context:  Chronic Illness     Findings of the 6 clinical characteristics of malnutrition:  Energy Intake:  7 - 75% or less estimated energy requirements for 1 month or longer  Weight Loss:  No significant weight loss(pt reports wt loss over past 2 years)     Body Fat Loss:  7 - Severe body fat loss Orbital   Muscle Mass Loss:  7 - Severe muscle mass loss Clavicles (pectoralis & deltoids)  Fluid Accumulation:  No significant fluid accumulation     Strength:  Not Performed    Estimated Daily Nutrient Needs:  Energy (kcal):  1396-6960 nano (30-35 cals/53kg); Weight Used for Energy Requirements:  Current(Continue to use 53kg)     Protein (g):  80 gms (1.5 gms/53kg);  Weight Used for Protein Requirements:  Current        Fluid (ml/day):   ; Method Used for Fluid Requirements:  1 ml/kcal      Nutrition Related Findings:  LBM 1/20      Wounds:  None       Current Nutrition Therapies:    DIET DYSPHAGIA MINCED AND MOIST; No Caffeine, No Drinking Straw  Dietary Nutrition Supplements: Frozen Oral Supplement    Anthropometric Measures:  · Height: 5' 9\" (175.3 cm)  · Current Body Weight: 117 lb (53.1 kg)   · Ideal Body Weight: 160 lbs; % Ideal Body Weight     · BMI: 17.3  · BMI Categories: Underweight (BMI less than 22) age over 72       Nutrition Diagnosis:   · Inadequate energy intake related to swallowing difficulty as evidenced by intake 26-50%, BMI      Nutrition Interventions:   Food and/or Nutrient Delivery:  Modify Oral Nutrition Supplement, Continue Current Diet, Start Calorie Count  Nutrition Education/Counseling:  Education not indicated   Coordination of Nutrition Care:  Continue to monitor while inpatient    Goals:  PO greater than 50% at meals/supp       Nutrition Monitoring and Evaluation:   Behavioral-Environmental Outcomes:  None Identified   Food/Nutrient Intake Outcomes:  Food and Nutrient Intake, Supplement Intake  Physical Signs/Symptoms Outcomes:  Weight     Discharge Planning:     Too soon to determine     Electronically signed by Evan Reed RD, LD on 1/20/21 at 2:19 PM EST    Contact: [phone]

## 2021-01-20 NOTE — PLAN OF CARE
Nutrition Problem #1: Inadequate energy intake  Intervention: Food and/or Nutrient Delivery: Modify Oral Nutrition Supplement, Continue Current Diet, Start Calorie Count  Nutritional Goals: PO greater than 50% at meals/supp

## 2021-01-20 NOTE — PROGRESS NOTES
Walker  Assistance: Contact guard assistance  Distance: 6' from EoB to chair, 12'x2 chair to chair. OT  PT Equipment Recommendations  Equipment Needed: No  Other: TBD based on therapy progress  Toilet - Technique: Ambulating  Equipment Used: Grab bars  Toilet Transfers Comments: ANNEMARIE, unsafe to attempt  Assessment        SLP  Current Diet : Dysphagia Minced and Moist (Dysphagia II)  Current Liquid Diet : Thin  Diet Solids Recommendation: Dysphagia Minced and Moist (Dysphagia II)  Liquid Consistency Recommendation: Thin    Body mass index is 17.45 kg/m². Assessment:  Patient Active Problem List   Diagnosis    COPD (chronic obstructive pulmonary disease)    Coronary artery disease    Hypertension    Hyperlipidemia    Fever    SOB (shortness of breath)    Vertigo    Exertional dyspnea    Paroxysmal atrial fibrillation (HCC)    Parkinson disease ([address] [address]    Hypotension    Chest pain    Severe malnutrition ([facility] [address]    NSTEMI (non-ST elevated myocardial infarction) ([facility] [address]    Debility       Plan:   Debility: Complicated by Parkinson. PT/OT     Dysphagia: dysphagia diet, SLP     CAD: s/p CABG and recent PCI. Coreg decreased to 3.125     Parkinson: Sinemet per home regimen     Orthostasis: midodrine increased to 5 -> 10, Florinef 0.2. s/p IVF bolus. - Allow permissive HTN      HLD: Lipitor 20     COPD: No current meds     Iron deficiency anemia: s/p supplementation     UTI: cipro - to complete today.     Bowels: Per protocol  Bladder: Per protocol   Sleep: Trazodone provided prn. Pain: Tylenol, tramadol  DVT PPx: Lovenox    Kaye Bowens MD 1/20/2021, 2:33 PM    * This document was created using dictation software. While all precautions were taken to ensure accuracy, errors may have occurred. Please disregard any typographical errors.

## 2021-01-20 NOTE — PROGRESS NOTES
ACUTE REHAB UNIT  SPEECH/LANGUAGE PATHOLOGY      [x] Daily  [] Weekly Care Conference Note  [] Discharge    Deniz Spears     GLD:  [facility]  Room #: YHL-6444/5936-46   Rehab Dx/Hx: Issac Shook [R53.81]  Date of Admit: 1/15/2021      Precautions: falls and aspirations  Home situation: Pt lives at home with several family members. Pt required assistance prior to admission   ST Dx: Oropharyngeal dysphagia; Dysarthria; Cognitive linguistic deficits     Daily Treatment Info:   Date: 2021   Tx session 1 Tx session 2   Pain None reported  None reported    Subjective     Pt up in bed for session. Able to sit up at the head of the bed. BP 120s/70s. Pt up in bed for session. BP 150s/90s    Objective:  Goals     Pt will tolerate recommended diet level with no overt s/s aspiration   Upper dentures placed prior to session.      Thin liquid   - pt exhibited overt signs of penetration/aspriation with all presentations of thin liquid   - attempted postural changes (chin downwards, support from shoulder for most upright position, 3 second bolus hold); none of the changes appeared effective in eliminated signs of aspiration/penetration   - pt did take one smaller sip that he appeared to have improved ability to maintain in the oral cavity however unable to consistently maintain this strategy   - difficulties observed: overt signs of premature bolus loss, a minimum of 3 swallows per presentation, reduced and delayed laryngeal elevation, signs of pharyngeal pooling     Soft solid   - moderately prolonged mastication   - overt signs of pharyngeal pooling, required multiple swallows, use of blended texture to clear reported globus sensation,  - required 3 swallows, encouraged use of hard swallow    Pudding-like texture (Magic Cup)   - pt exhibited signs of pharyngeal pooling and reported globus sensation between sequential swallows   - pt required a minimum of 3 swallows per presentation, with a max of 6   - provided mod cues for use of effortful/hard swallow     Pt required encouragement for ongoing consumption. Pt will complete a variety of swallow maneuvers in order to improve swallow function. CTAR   - attempted use of rolled washcloth for resistance, pt unable to hold in place on 5 attempts   - use of SLP fist for improved resistance   - completed x10     Pudding via straw for resistance   - completed x1   - able to propel entire length of straw with moderate effort and extended time     Provided education re use of NMES (Vital Stim), pt open to the information. Encouraged to shave neck in order to apply stimulation treatment. Goal not targeted this session. Pt will recall and utilize safe swallow strategies in order to improve safety during po intake. Pt agreeable to upright posture while in bed and implementing small sips with verbal cues. Goal not targeted this session. Pt will improve vocal volume and use LOUD speech across graded tasks with 80% accuracy and carry over to the conversational environment with >min cues. Goal not targeted this session. Pt with reduced intensity and precision; approximately 80% intelligibility for structured conversation. LOUD treatment:  -MPT: x3; max of 22 seconds   -High pitch: x5; appropriate loudness with min verbal cues and initial model   -Low pitch: x5; appropriate loudness with min verbal cues and initial model    -Sentences: DNT   -Paragraph:  DNT     Pt verbalized that he needs to use louder speech and appreciated the adequate loudness used during exercises. Patient will complete convergent and divergent naming tasks for improved thought organization with > 80% accuracy. Goal not targeted this session. Goal not targeted this session. Pt will complete short term memory tasks and recall fx information for improved recall and carryover of information from day to day.    Functional recall   - recalled basic temporal orientation information (month, year)   - required min cues for exact date, day of week (within one day of each)   - min/mod cues for recall of events yesterday (orthostasis)   - appropriately recalled upcoming daily therapies with reference to schedule board  Functional recall   - recalled basic temporal orientation information (month, year)   - required min cues for exact date, day of week (within one day of each)   - provided pt with digital clock for ongoing reference in his room      Other areas targeted:     Education:   Provided education re rationale for tasks and plan of care. Pt verbalized understanding and agreement. Ongoing education re rationale for tasks and plan of care. Pt verbalized understanding       Safety Devices: [x] Call light within reach  [] Chair alarm activated  [x] Bed alarm activated  [] Other: [x] Call light within reach  [] Chair alarm activated  [x] Bed alarm activated  [] Other:      Assessment:   Speech Therapy Diagnosis  Cognitive Diagnosis: Pt presents with mild-moderate cognitive deficits in the domains of orientation, thought organization, short term memory, and working memory. Speech Diagnosis: Pt presents with moderate dysarthria charatcerized by reduced breath support, vocal intensity, and articulatory precision. Current Diet Order: DIET DYSPHAGIA MINCED AND MOIST; No Caffeine, No Drinking Straw  Dietary Nutrition Supplements: Frozen Oral Supplement   Recommended Form of Meds: Crushed in puree as able  Compensatory Swallowing Strategies: Alternate solids and liquids, Eat/Feed slowly, No straws, Upright as possible for all oral intake, Remain upright for 30-45 minutes after meals, Small bites/sips     Plan:     Frequency:  5days/week   30-60  Minutes/day; attempting 60 minutes of treatment, will addend POC pending pt's tolerance     Discharge Recommendations:   Barriers: long term dysphagia; nutritional compromise;     Discharge Recommendations:  [] Home independently  [x] Home with assistance []  24 hour supervision  [] SNF [] Other:  Continued SLP Treatment:  [x] Yes [] No [] TBD based on progress while on ARU [] Vital Stim indicated [] Other:   Estimated discharge date: 2/2/21    Type of Total Treatment Minutes   Session 1   Session 2   Time In 0800 1000   Time Out 0830 1030   Timed Code Minutes  10 5   Individual Treatment Minutes  30 30   Co-Treatment Minutes      Group Treatment Minutes      Concurrent Treatment Minutes        TOTAL DAILY MINUTES:  60    Electronically Signed by     Aimee Leblanc M.A. CCC-SLP S.PRobb [id number]  Speech-Language Pathologist [phone]  1/20/2021 9:08 AM     The speech-language pathologist was present, directed the patient's care, made skilled judgment and was responsible for assessment and treatment.     AALIYAH Crocker.,  Speech-Language Pathology

## 2021-01-20 NOTE — PROGRESS NOTES
Physical Therapy  Facility/Department: [facility] ACUTE REHAB UNIT  Daily Treatment Note  NAME: Ovidio Da Silva  : 1940  MRN: [id number]    Date of Service: 2021    Discharge Recommendations:  24 hour supervision or assist, 2-3 sessions per week, S Level 3   PT Equipment Recommendations  Equipment Needed: No  Other: TBD based on therapy progress    Assessment   Body structures, Functions, Activity limitations: Decreased functional mobility ; Decreased ROM; Decreased safe awareness;Decreased endurance;Decreased balance;Decreased posture;Decreased ADL status; Decreased strength  Assessment: Patient remains limited by low BP with mobility. Pt experiences significant drop in BP with upright activity that causes dizziness and reduced activity tolerance. When given prolonged seated rest, pt's vitals begin to stabilize. Pt also demonstrates impaired functional mobility and muslce weakness. Patient will continue to benefit from additional skilled PT intervention to facilitate safe mobility and to optimize (I) to promote return to prior level of function. Treatment Diagnosis: impaired functional mobility  Prognosis: Good;Guarded(secondary to orthostatic BP)  Decision Making: Medium Complexity  Clinical Presentation: evolving  PT Education: PT Role;Goals;Plan of Care;General Safety;Transfer Training;Energy Conservation;Gait Training;Functional Mobility Training  Patient Education: Pt verbalizes understanding, but demonstrates slight cognitive delay so could benefit from reinforcement  Barriers to Learning: potential cognitive deficit(Pt perseverated on item on his pants. Pt also had incident of getting out of chair, setting off chair alarm, to search for money clip in back pack.   Therapist tried to help search for, but was unable to find)  REQUIRES PT FOLLOW UP: Yes  Activity Tolerance  Activity Tolerance: Patient limited by fatigue;Patient limited by endurance  Activity Tolerance: Orthostatic Hypotension continues to limit patient's activity tolerance when upright     Patient Diagnosis(es): There were no encounter diagnoses. has a past medical history of CAD (coronary artery disease), COPD (chronic obstructive pulmonary disease) ([facility] [address] Coronary artery bypass, Coronary stent, Hyperlipidemia, Hypertension, Osteoarthritis, Parkinson disease ([facility] [address] Prostate cancer ([facility] [address] and Rotator cuff syndrome. has a past surgical history that includes Prostatectomy (2004); Colonoscopy (2005); Coronary artery bypass graft; Cardiac surgery (1998); Coronary angioplasty (1993); Coronary angioplasty (2013); Cataract removal with implant (2015); sigmoidoscopy (N/A, 1/25/2019); and Colonoscopy (N/A, 8/30/2019). Restrictions  Restrictions/Precautions  Restrictions/Precautions: Fall Risk, Modified Diet  Required Braces or Orthoses?: No  Position Activity Restriction  Other position/activity restrictions: 76-year-old gentleman with history of CAD status post PCI 1/8/2021 for NSTEMI with JAMES x2 to RCA is a direct admit from outside hospital with complaint of chest pain. Troponin is mildly elevated and stable. EKG is nonspecific and unchanged from prior. Cardiology evaluation complete and patient is cleared to go home from cardiology standpoint. Subjective   General  Chart Reviewed: Yes  Response To Previous Treatment: Patient with no complaints from previous session. Family / Caregiver Present: No  Referring Practitioner: MD Evette  Subjective  Subjective: Pt met in room laying in bed with HoB elevated to a near-long sitting position. Pt is pleasant and states that he is doing well this AM.  He denies any feelings of dizzines or pain, and is agreaable to PT.     Objective   Bed mobility  Rolling to Right: Supervision  Supine to Sit: Supervision(HoB elevated and use of hand rails to adjust self)  Scooting: Supervision(seated at EoB with use of hand rails)  Comment: BP sitting at EoB 98/61, HR 73  Transfers  Sit to Stand: Contact guard assistance(Stood to RW at [facility])  Stand to sit: Contact guard assistance(From RW to EoB)  Bed to Chair: Contact guard assistance(pt walked ~6' from EoB to chair)  Comment: Pt gets very dizzy after prolonged standing and states that he feels like he is \"cardoza out\". After walking from EoB to chair: BP 66/? - could not obtain accurate reading; After seated rest: BP 77/50, HR 71. Ambulation  Ambulation?: Yes  More Ambulation?: No  Ambulation 1  Surface: level tile  Device: Rolling Walker  Assistance: Contact guard assistance  Quality of Gait: Pt slightly unsteady with forward flexed posture, slow ja, and decreased stride length. Pt has low activity tolerance and is impulsive so needs to be encouraged to take rest breaks. (Pt stated that he does not like when therapist holds gait belt because \"it throws of my ja\", but pt was reminded of safety risks and was agreeable.)  Gait Deviations: Slow Ja;Decreased step length;Decreased step height;Decreased arm swing;Decreased head and trunk rotation; Shuffles  Distance: 6' from EoB to chair, 12'x2 chair to chair. Comments: Pt had low ambulation tolerance, and room chair was set up at foot of bed for second walk for him to walk from chair to chair with seated rest in between walks. Vitals 2nd after walk: BP 71/45, HR 72; After seated rest: BP 99/55        Exercises  Hamstring Sets: Therapist-resisted HS curls x10 on BLEs  Hip Flexion: seated marches x 10 reps B LEs  Hip Abduction: Therapist-resisted, seated at EoB x10; seated hip add pillow squeezes x 10 reps x 1 set - 3 second squeezes  Knee Long Arc Quad: seated at EoB x10 on BLEs  Knee Short Arc Quad: Izzyvu Reyesaver Ankle Pumps: seated at EoB x20 BLEs  Comments: All exercises performed seated at EoB on BLEs. After exercise: BP 81/48, HR 74; After seated rest: BP 90/54, HR 73  Other exercises  Other exercises?: No       Second Session:  S: Pt met in room sitting in chair eating lunch with wife.   Pt states that he is feeling good this afternoon, denies pain and is agreeable to PT. His wife is interested in progress and asked about his progress before leaving. O: Pt Sit<>stand CGA for all transfers this afternoon. Starting BP sitting in chair: 110/68, HR 70. Pt stood at [facility] to assess standing vitals: BP 68/36, HR 77.  Pt was instructed to sit down and denied dizziness. After seated rest, BP reassessed: 135/80, HR 68. Pt requested to use the restroom. Pt impulsively stood and began to walk to bathroom using RW with CGA. Pt ambulated 15' to bathroom using RW and CGA. Pt denied dizziness. Pt 's RN administered medication while pt was seated on toilet. Pt required Nisha to don/doff briefs and pants, but was able to perform pericare with Tereso. Pt stood from toilet and washed hands, then walked 10' to w/c with CGA using RW. Vitals assessed once seated in w/c: BP 96/60, HR 73.  Pt denied dizziness and was wheeled to therapy gym. Pt stood at [facility] and performed 3x10 conetaps on BLEs with standing rest between sets. Pt vitals assessed midway through stand because pt stated he was feeling good: BP 83/52, HR 77.  Pt stood for a total of 5 minutes without requesting rest.  Pt performed stairs with CGA. Pt was very quick to ascend/descend, and was encouraged to slow down. Pt performed 2 sets of 4 stairs with BHR and was given seated rest break. Vitals assessed after stairs: BP 89/51, HR 79. Pt performed stand-pivot transfer from w/c to bed with CGA. Pt was Supervision for bed mobility. Pt made comfortable in bed, bed alarm was armed, and call light was within reach. A: Pt demonstrated improved tolerance in the afternoon compared to AM session. He is impulsive and needs cues to slow down, especially when performing functional mobility. Pt would continue to benefit from skilled PT to improve activity tolerance and strengthen muscles for return to PLOF.       Goals  Short term goals  Time Frame for Short term goals: 7-14 days  Short term goal 1: Patient will perform bed mobility independently. Short term goal 2: Patient will perform bed-chair transfer MOD I w/ LRAD. Short term goal 3: Patient will ambulate 150' MOD I w/ LRAD. Short term goal 4: Patient will negotiate up/down 12 stairs w/ single railing MOD I to access 2nd floor kitchen. Patient Goals   Patient goals : to get home    Plan    Plan  Times per week: 75 minutes/day, 5 days/week  Times per day: Daily  Current Treatment Recommendations: Strengthening, Balance Training, Functional Mobility Training, Transfer Training, Gait Training, Stair training, Home Exercise Program, Safety Education & Training, Endurance Training, Patient/Caregiver Education & Training, Equipment Evaluation, Education, & procurement, Positioning  Safety Devices  Type of devices: Call light within reach, Gait belt, Telesitter in use, Nurse notified, All fall risk precautions in place, Chair alarm in place, Patient at risk for falls, Left in chair  Restraints  Initially in place: No     Therapy Time   Individual Concurrent Group Co-treatment   Time In 1045         Time Out 1130         Minutes 45         Timed Code Treatment Minutes: 45 Minutes     Second Session Therapy Time:   Individual Concurrent Group Co-treatment   Time In 1410         Time Out 1445         Minutes 35           Timed Code Treatment Minutes:  35 Minutes    Total Treatment Minutes:  80 minutes     NICOLASA Lemus PT   PT providing direct supervision during session and assisting in making skilled judgements throughout session.   [address], [place] [address]

## 2021-01-20 NOTE — PROGRESS NOTES
Occupational Therapy  Facility/Department: [facility] ACUTE REHAB UNIT  Daily Treatment Note  NAME: Meka Knight  : 1940  MRN: [id number]    Date of Service: 2021    Discharge Recommendations:  Home with Home health OT, 24 hour supervision or assist, S Level 3  OT Equipment Recommendations  Equipment Needed: Yes  Walker: Rolling  ADL Assistive Devices: Shower Chair with back  Other: continue to assess    Assessment   Assessment: Pt is not at baseline level of function and will benefit from continued OT to address the above limitations. Pt continues to be limited by low BP this date. Treatment Diagnosis: Debility and decreased ADLs due to chest pain, Parkinsons recent NSTEMI. History: Wife helps with ADLs and IADLs, furniture walks in home, Parkinsons  OT Education: OT Role;Plan of Care;ADL Adaptive Strategies;Transfer Training;Precautions  Patient Education: pt requires continued education, not independent in learning  Barriers to Learning: Cognition  REQUIRES OT FOLLOW UP: Yes  Activity Tolerance  Activity Tolerance: Treatment limited secondary to medical complications (free text); Patient limited by fatigue  Activity Tolerance: Pt with varying levels of BP throughout session. Pt unable to tolerate standing longer than 1 minute due to BP dropping to 66/33. RN notified and pt returned to supine. Safety Devices  Safety Devices in place: Yes  Type of devices: Left in bed;Bed alarm in place;Call light within reach; Patient at risk for falls; All fall risk precautions in place;Nurse notified         Patient Diagnosis(es)     has a past medical history of CAD (coronary artery disease), COPD (chronic obstructive pulmonary disease) ([facility] [address] Coronary artery bypass, Coronary stent, Hyperlipidemia, Hypertension, Osteoarthritis, Parkinson disease ([facility] [address] Prostate cancer ([facility] [address] and Rotator cuff syndrome. has a past surgical history that includes Prostatectomy (); Colonoscopy ();  Coronary artery bypass graft; Cardiac surgery (1998); Coronary angioplasty (1993); Coronary angioplasty (2013); Cataract removal with implant (2015); sigmoidoscopy (N/A, 1/25/2019); and Colonoscopy (N/A, 8/30/2019). Restrictions  Restrictions/Precautions  Restrictions/Precautions: Fall Risk, Modified Diet  Required Braces or Orthoses?: No  Position Activity Restriction  Other position/activity restrictions: 73-year-old gentleman with history of CAD status post PCI 1/8/2021 for NSTEMI with JAMES x2 to RCA is a direct admit from outside hospital with complaint of chest pain. Troponin is mildly elevated and stable. EKG is nonspecific and unchanged from prior. Cardiology evaluation complete and patient is cleared to go home from cardiology standpoint. Subjective   General  Chart Reviewed: Yes  Additional Pertinent Hx: CAD, CABG, COPD, HTN, prostate cancer, OA  Family / Caregiver Present: No  Diagnosis: Chest pain, debility  Subjective  Subjective: Pt sitting upright in bed, agreeable to OT.   General Comment  Comments: RN okay for therapy  Vital Signs  Pulse: 75  BP: 99/61  BP Location: Left upper arm  Patient Position: Sitting  Patient Currently in Pain: Denies     Orientation    Orientation  Overall Orientation Status: Impaired  Orientation Level: Oriented to time;Oriented to place;Oriented to person;Disoriented to situation  Objective    ADL  Grooming: Setup;Supervision(pt completed tasks seated in w/c due to low BP)  UE Bathing: Supervision;Setup(sponge bathing while seated in w/c; verbal cues for thoroughness)  LE Dressing: Minimal assistance(donning briefs and pants; pt continues to be limited by low BP)  Toileting: Stand by assistance(during BM; pt limited by low bP)  Additional Comments: Unable to complete additional ADLs due to low BP        Balance  Sitting Balance: Supervision(seated at EOB)  Standing Balance: Contact guard assistance  Standing Balance  Time: up to 1 min  Activity: Toileting, LB dressing, functional transfers, functional mobility  Comment: standing tolerance limited due to orthostatic BP (66/33?- RN notified  Functional Mobility  Functional - Mobility Device: Wheelchair  Activity: To/from bathroom  Assist Level: Stand by assistance  Functional Mobility Comments: w/c used due to orthostatic  Bed mobility  Supine to Sit: Supervision  Sit to Supine: Supervision  Scooting: Supervision  Transfers  Sit to stand: Contact guard assistance  Stand to sit: Contact guard assistance  Transfer Comments: verbal cues for safety of hand placement                       Cognition  Overall Cognitive Status: Exceptions  Arousal/Alertness: Delayed responses to stimuli  Following Commands: Follows one step commands with repetition; Follows one step commands with increased time  Attention Span: Attends with cues to redirect; Difficulty attending to directions  Memory: Decreased recall of recent events;Decreased short term memory  Safety Judgement: Decreased awareness of need for safety;Decreased awareness of need for assistance  Problem Solving: Assistance required to implement solutions;Assistance required to generate solutions  Insights: Decreased awareness of deficits  Initiation: Requires cues for some  Sequencing: Requires cues for some  Cognition Comment: Pt with increased difficulty to verbalize symptoms during orthostatic hypotension                  Plan   Plan  Times per week: 5-7x/wk, 75 min  Times per day: Daily  Current Treatment Recommendations: Strengthening, ROM, Functional Mobility Training, Endurance Training, Safety Education & Training, Self-Care / ADL, Equipment Evaluation, Education, & procurement, Patient/Caregiver Education & Training, Balance Training    Goals  Short term goals  Time Frame for Short term goals: 2 weeks  Short term goal 1: Supervision for bathing- not met, progressing  Short term goal 2: Mod I dressing- not met, progressing  Short term goal 3: Mod I toileting- Not met progressing  Short term goal 4: Mod I for functional transfers- Not met progressing  Short term goal 5: Mod I for functional mobility- Not met progressing  Short term goal 6: Mod I grooming- Not met progressing  Long term goals  Time Frame for Long term goals : STGs= LTGs  Patient Goals   Patient goals : Go home with wife       Therapy Time   Individual Concurrent Group Co-treatment   Time In 0845         Time Out 0930         Minutes 45         Timed Code Treatment Minutes: 45 Minutes         Second Session Therapy Time:   Individual Concurrent Group Co-treatment   Time In 1245         Time Out 1330         Minutes 45           Timed Code Treatment Minutes:  45 Minutes    Total Treatment Minutes:  90 minutes    Leti Covarrubias OTR/L

## 2021-01-21 PROCEDURE — 6370000000 HC RX 637 (ALT 250 FOR IP): Performed by: PHYSICAL MEDICINE & REHABILITATION

## 2021-01-21 PROCEDURE — 6360000002 HC RX W HCPCS: Performed by: PHYSICAL MEDICINE & REHABILITATION

## 2021-01-21 PROCEDURE — 97535 SELF CARE MNGMENT TRAINING: CPT

## 2021-01-21 PROCEDURE — 2580000003 HC RX 258: Performed by: PHYSICAL MEDICINE & REHABILITATION

## 2021-01-21 PROCEDURE — 97530 THERAPEUTIC ACTIVITIES: CPT

## 2021-01-21 PROCEDURE — 97110 THERAPEUTIC EXERCISES: CPT

## 2021-01-21 PROCEDURE — 92526 ORAL FUNCTION THERAPY: CPT

## 2021-01-21 PROCEDURE — 97129 THER IVNTJ 1ST 15 MIN: CPT

## 2021-01-21 PROCEDURE — 1280000000 HC REHAB R&B

## 2021-01-21 PROCEDURE — 92507 TX SP LANG VOICE COMM INDIV: CPT

## 2021-01-21 RX ORDER — SODIUM CHLORIDE 9 MG/ML
INJECTION, SOLUTION INTRAVENOUS CONTINUOUS
Status: ACTIVE | OUTPATIENT
Start: 2021-01-21 | End: 2021-01-22

## 2021-01-21 RX ADMIN — SODIUM CHLORIDE: 9 INJECTION, SOLUTION INTRAVENOUS at 13:19

## 2021-01-21 RX ADMIN — CLOPIDOGREL 75 MG: 75 TABLET, FILM COATED ORAL at 10:17

## 2021-01-21 RX ADMIN — CARBIDOPA AND LEVODOPA 1 TABLET: 25; 100 TABLET, ORALLY DISINTEGRATING ORAL at 10:26

## 2021-01-21 RX ADMIN — FAMOTIDINE 20 MG: 20 TABLET, FILM COATED ORAL at 21:39

## 2021-01-21 RX ADMIN — MIDODRINE HYDROCHLORIDE 10 MG: 5 TABLET ORAL at 10:17

## 2021-01-21 RX ADMIN — FAMOTIDINE 20 MG: 20 TABLET, FILM COATED ORAL at 10:17

## 2021-01-21 RX ADMIN — ASPIRIN 81 MG: 81 TABLET, CHEWABLE ORAL at 10:17

## 2021-01-21 RX ADMIN — CARVEDILOL 3.12 MG: 3.12 TABLET, FILM COATED ORAL at 18:03

## 2021-01-21 RX ADMIN — MIDODRINE HYDROCHLORIDE 10 MG: 5 TABLET ORAL at 13:20

## 2021-01-21 RX ADMIN — ENOXAPARIN SODIUM 40 MG: 40 INJECTION SUBCUTANEOUS at 10:24

## 2021-01-21 RX ADMIN — CARVEDILOL 3.12 MG: 3.12 TABLET, FILM COATED ORAL at 10:26

## 2021-01-21 RX ADMIN — FLUDROCORTISONE ACETATE 0.2 MG: 0.1 TABLET ORAL at 10:26

## 2021-01-21 RX ADMIN — CARBIDOPA AND LEVODOPA 1 TABLET: 25; 100 TABLET, ORALLY DISINTEGRATING ORAL at 21:39

## 2021-01-21 RX ADMIN — ATORVASTATIN CALCIUM 20 MG: 20 TABLET, FILM COATED ORAL at 21:39

## 2021-01-21 RX ADMIN — CARBIDOPA AND LEVODOPA 1 TABLET: 25; 100 TABLET, ORALLY DISINTEGRATING ORAL at 13:19

## 2021-01-21 ASSESSMENT — PAIN SCALES - GENERAL: PAINLEVEL_OUTOF10: 0

## 2021-01-21 NOTE — PROGRESS NOTES
Patient oriented to person, place, time. Vital signs stable, blood pressure is very labile throughout the day, nursing is monitoring as well as therapies. Patient remains on room air only. No complaints of pain today. Shift assessment completed. Medication administration completed without any complications. Patient is up with activity times 1 with a stand pivot contact guard assist. Patients tolerating dysphagia diet. Remains continent of bowel and bladder, has periods of incontinence of urine. He tries to get up on his own, safety precautions reinforced. Non skid socks on, LEXIS sys cam in room. No needs voiced at this time. Call light within reach. Will continue to monitor.  Electronically signed by Savanah Wall RN on 1/21/2021 at 6:53 PM

## 2021-01-21 NOTE — PROGRESS NOTES
Physical Therapy  Facility/Department: [facility] ACUTE REHAB UNIT  Daily Treatment Note  NAME: Javier David  : 1940  MRN: [id number]    Date of Service: 2021    Discharge Recommendations:  24 hour supervision or assist, S Level 3, Home with Home health PT   PT Equipment Recommendations  Equipment Needed: No  Other: TBD based on therapy progress    Assessment   Assessment: Pt. continues to remain very limited by low BP, 1st session this date in the bed as BP 65/38 while supine at the start of session. Pt. continues to require skilled PT to improve funcitonal mobility. Treatment Diagnosis: impaired functional mobility  Prognosis: Good;Guarded  PT Education: PT Role;Goals;Plan of Care;General Safety;Transfer Training;Energy Conservation;Gait Training;Functional Mobility Training  Patient Education: Pt verbalizes understanding, but demonstrates slight cognitive delay so could benefit from reinforcement  REQUIRES PT FOLLOW UP: Yes  Activity Tolerance  Activity Tolerance: Patient limited by fatigue;Patient limited by endurance  Activity Tolerance: Orthostatic Hypotension continues to limit patient's activity tolerance when upright     Patient Diagnosis(es): There were no encounter diagnoses. has a past medical history of CAD (coronary artery disease), COPD (chronic obstructive pulmonary disease) ([facility] [address] Coronary artery bypass, Coronary stent, Hyperlipidemia, Hypertension, Osteoarthritis, Parkinson disease ([facility] [address] Prostate cancer ([facility] [address] and Rotator cuff syndrome. has a past surgical history that includes Prostatectomy (); Colonoscopy (); Coronary artery bypass graft; Cardiac surgery (); Coronary angioplasty (); Coronary angioplasty (); Cataract removal with implant (); sigmoidoscopy (N/A, 2019); and Colonoscopy (N/A, 2019).     Restrictions  Restrictions/Precautions  Restrictions/Precautions: Fall Risk, Modified Diet  Required Braces or Orthoses?: No  Position Activity Restriction  Other position/activity restrictions: 67-year-old gentleman with history of CAD status post PCI 1/8/2021 for NSTEMI with JAMES x2 to RCA is a direct admit from outside hospital with complaint of chest pain. Troponin is mildly elevated and stable. EKG is nonspecific and unchanged from prior. Cardiology evaluation complete and patient is cleared to go home from cardiology standpoint. Subjective   General  Chart Reviewed: Yes  Response To Previous Treatment: Patient with no complaints from previous session. Family / Caregiver Present: No  Subjective  Subjective: Supine in bed, denies pain  General Comment  Comments: BP taken at start of session:  65/38 with HR of 81.  Neeru BAR notified. Session adjusted to in bed activities          Orientation  Orientation  Overall Orientation Status: Within Functional Limits  Cognition      Objective   Bed mobility  Rolling to Left: Supervision  Rolling to Right: Supervision  Supine to Sit: Supervision  Sit to Supine: Supervision  Scooting: Supervision  Comment: Requires verbal  cues for sequencing. Transfers  Sit to Stand: Unable to assess  Stand to sit: Unable to assess  Comment: Unable to stand first session d/t BP of 65/38 in supine. Ambulation  Ambulation?: No(unsafe d/t hypotension, 65/38 in supine)        Exercises  Bridging: (x 5 x 2)  Heelslides: manual resistive heel slides, 15 x 2  Hip Abduction: manual resistive x 15 x2  Ankle Pumps: manual resistive x 15 x2         Comment: 1st session:  Pt. incontinent of BM. Rolling R to/from L with verbal cues and supervision to complete pericare with Total A. Pt. was in semifowlers position with BP of 65/38, PT put HOB flat and notified Neeru BAR. Following exercises in flat position and changing brief, /69. Therefore, Pt. got up to the EOB but sitting for 1-2 minutes at the EOB and BP decreased to 78/46. Pt. returned to supine position. Second session:  Pt. Supine in bed with BP of 132/81.   Supine to sit with Supervision and verbal cues. Sat at the EOB and completed LAQ'x x 15, AP x 15 and seated marching x 15. Sit to/from stand with CGA. Able to stand at the [facility] and complete marching x 10, hip abd/add x 10 and toe raises x 10 but with seated rest breaks between each exercise d/t fatigue and likely lightheadedness although Pt. did not admit this. BP in standing would not register. Left supine in bed d/t hypotension. Call light in reach and bed alarm in place. Goals  Short term goals  Time Frame for Short term goals: 7-14 days  Short term goal 1: Patient will perform bed mobility independently. Short term goal 2: Patient will perform bed-chair transfer MOD I w/ LRAD. Short term goal 3: Patient will ambulate 150' MOD I w/ LRAD. Short term goal 4: Patient will negotiate up/down 12 stairs w/ single railing MOD I to access 2nd floor kitchen.   Patient Goals   Patient goals : to get home    Plan    Plan  Times per week: 75 minutes/day, 5 days/week  Times per day: Daily  Current Treatment Recommendations: Strengthening, Balance Training, Functional Mobility Training, Transfer Training, Gait Training, Stair training, Home Exercise Program, Safety Education & Training, Endurance Training, Patient/Caregiver Education & Training, Equipment Evaluation, Education, & procurement, Positioning  Safety Devices  Type of devices: Gait belt, Telesitter in use, Nurse notified, All fall risk precautions in place, Patient at risk for falls, Bed alarm in place, Call light within reach, Left in bed  Restraints  Initially in place: No     Therapy Time   Individual Concurrent Group Co-treatment   Time In 0915         Time Out 1000         Minutes 45         Timed Code Treatment Minutes: 39 Minutes     Second Session Therapy Time:   Individual Concurrent Group Co-treatment   Time In 1125         Time Out 1155         Minutes 30           Timed Code Treatment Minutes:  27+63    Total Treatment Minutes:  [address], [place], [id number]

## 2021-01-21 NOTE — PROGRESS NOTES
Monica Read  1/21/2021  [id number]    Chief Complaint: Debility    Subjective:   No overnight events. No current complaints. Orthostasis limiting therapy progress. ROS: No CP, SOB, dyspnea    Objective:  Patient Vitals for the past 24 hrs:   BP Temp Temp src Pulse Resp SpO2 Weight   01/21/21 0506 -- -- -- -- -- -- 119 lb 4.3 oz (54.1 kg)   01/20/21 2145 136/74 97.6 °F (36.4 °C) Oral 74 16 96 % --   01/20/21 1746 (!) 179/94 -- -- 78 -- -- --   01/20/21 1319 99/61 -- -- -- -- -- --   01/20/21 1310 (!) 66/45 -- -- -- -- -- --   01/20/21 1308 (!) 72/36 -- -- -- -- -- --   01/20/21 1303 107/66 -- -- 75 -- -- --   01/20/21 1258 116/70 -- -- 67 -- -- --   01/20/21 1243 127/73 -- -- 75 -- -- --   01/20/21 0938 98/61 96.6 °F (35.9 °C) Axillary 70 16 96 % --   01/20/21 0928 (!) 93/57 -- -- -- -- -- --   01/20/21 0920 (!) 66/33 -- -- -- -- -- --     Gen: No distress, pleasant. Resting in bedside chair. Thin  HEENT: Normocephalic, atraumatic. CV: Regular rate and rhythm. No MRG   Resp: No respiratory distress. CTAB   Abd: Soft, nontender nondistended  Ext: No edema. Neuro: Alert, oriented, appropriately interactive. Higher level cognitive deficits. Laboratory data: Available via EMR. Therapy progress:  PT  Position Activity Restriction  Other position/activity restrictions: 71-year-old gentleman with history of CAD status post PCI 1/8/2021 for NSTEMI with JAMES x2 to RCA is a direct admit from outside hospital with complaint of chest pain. Troponin is mildly elevated and stable. EKG is nonspecific and unchanged from prior. Cardiology evaluation complete and patient is cleared to go home from cardiology standpoint.   Objective     Sit to Stand: Contact guard assistance(Stood to RW at [facility])  Stand to sit: Contact guard assistance(From RW to EoB)  Bed to Chair: Contact guard assistance(pt walked ~6' from EoB to chair)  Device: [address] [employer]: Contact guard assistance  Distance: 6' from EoB to chair, 12'x2 chair to chair. OT  PT Equipment Recommendations  Equipment Needed: No  Other: TBD based on therapy progress  Toilet - Technique: Ambulating  Equipment Used: Grab bars  Toilet Transfers Comments: ANNEMARIE, unsafe to attempt  Assessment        SLP  Current Diet : Dysphagia Minced and Moist (Dysphagia II)  Current Liquid Diet : Thin  Diet Solids Recommendation: Dysphagia Minced and Moist (Dysphagia II)  Liquid Consistency Recommendation: Thin    Body mass index is 17.61 kg/m². Assessment:  Patient Active Problem List   Diagnosis    COPD (chronic obstructive pulmonary disease)    Coronary artery disease    Hypertension    Hyperlipidemia    Fever    SOB (shortness of breath)    Vertigo    Exertional dyspnea    Paroxysmal atrial fibrillation (HCC)    Parkinson disease ([address]    Hypotension    Chest pain    Severe malnutrition ([facility] [address]    NSTEMI (non-ST elevated myocardial infarction) ([facility] [address]    Debility       Plan:   Debility: Complicated by Parkinson. PT/OT     Dysphagia: dysphagia diet, SLP     CAD: s/p CABG and recent PCI. Coreg decreased to 3.125     Parkinson: Sinemet per home regimen     Orthostasis: midodrine increased to 10, Florinef 0.2. s/p IVF bolus. - Allow permissive HTN      HLD: Lipitor 20     COPD: No current meds     Iron deficiency anemia: s/p supplementation     UTI: cipro completed     Bowels: Per protocol  Bladder: Per protocol   Sleep: Trazodone provided prn  Pain: Tylenol, tramadol  DVT PPx: Dino Sullivan MD 1/21/2021, 9:04 AM    * This document was created using dictation software. While all precautions were taken to ensure accuracy, errors may have occurred. Please disregard any typographical errors.

## 2021-01-21 NOTE — PROGRESS NOTES
Calorie Count Note    Type and Reason for Visit: Calorie Count    Intervention & Recommendations:   1. Continue Calorie Count  2. RECORD SPECIFIC PO INTAKE OF FOOD AND BEVERAGES CONSUMED      Nutrition Assessment: Based on nutrition evaluation from 1/20 and conversation with SLP, decided to begin calorie count with dinner on 1/20. Current diet and supplement order:    DIET DYSPHAGIA MINCED AND MOIST; No Caffeine, No Drinking Straw  Dietary Nutrition Supplements: Frozen Oral Supplement  Dietary Nutrition Supplements: Other Oral Supplement (see comment)    Comparative Standards (Estimated Nutrition Needs):   · Estimated Daily Total Kcal: 2882-2406; 1725 avg  · Estimated Daily Protein (g): 80     Day 1 (1/20/21- started with dinner)  Meal Calories Protein Comments   Breakfast   **data provided: Ate 0-25% of meal, picked at it. Drank 40-60cc. Lunch      Dinner **     Snacks/Supplements      Total       % Kcal needs met % Protein needs met      Day 2 (1/21/21)  Meal Calories Protein Comments   Breakfast      Lunch      Dinner      Snacks/Supplements      Total       % Kcal needs met % Protein needs met      Day 3   Meal Calories Protein Comments   Breakfast      Lunch      Dinner      Snacks/Supplements      Total       % Kcal needs met % Protein needs met      Intervention & Recommendations:   1. Continue Calorie Count  2.  RECORD SPECIFIC PO INTAKE OF FOOD AND BEVERAGES CONSUMED    Electronically signed by Dariel Rosa RD, LD on 1/21/21 at 12:07 PM EST  Contact Number: [phone]

## 2021-01-21 NOTE — PROGRESS NOTES
Occupational Therapy  Facility/Department: [facility] ACUTE REHAB UNIT  Daily Treatment Note  NAME: Lorenzo Goodwin  : 1940  MRN: [id number]    Date of Service: 2021    Discharge Recommendations:  Home with Home health OT, 24 hour supervision or assist, S Level 3  OT Equipment Recommendations  Equipment Needed: Yes  Walker: Rolling  ADL Assistive Devices: Shower Chair with back    Assessment   Performance deficits / Impairments: Decreased functional mobility ; Decreased endurance;Decreased ADL status; Decreased safe awareness;Decreased cognition;Decreased fine motor control;Decreased ROM; Decreased strength  Assessment: Pt is not at baseline level of function and will benefit from continued OT to address the above limitations. Pt continues to be limited by low BP this date. Treatment Diagnosis: Debility and decreased ADLs due to chest pain, Parkinsons recent NSTEMI. Prognosis: Fair  History: Wife helps with ADLs and IADLs, furniture walks in home, Parkinsons  Exam: Periods of confusion, CGA/min assist with transfers, mod/mas assist ADLs, intention tremors. Assistance / Modification: RW, evolving presentation  OT Education: OT Role;Plan of Care;ADL Adaptive Strategies;Transfer Training;Precautions  Patient Education: pt requires continued education, not independent in learning  Barriers to Learning: Cognition  REQUIRES OT FOLLOW UP: Yes  Activity Tolerance  Activity Tolerance: Treatment limited secondary to medical complications (free text); Patient limited by fatigue  Activity Tolerance: Pt with varying levels of BP throughout session. Pt with 80/46 after inital transfer. Pt returned supine and BP measured 64/37. RN notified. Safety Devices  Safety Devices in place: Yes  Type of devices: Left in bed;Bed alarm in place;Call light within reach; Patient at risk for falls; All fall risk precautions in place;Nurse notified  Restraints  Initially in place: No         Patient Diagnosis(es): Debility      has a past LB dressing, functional transfers, functional mobility  Comment: standing tolerance limited due to orthostatic BP  Functional Mobility  Functional - Mobility Device: Wheelchair  Activity: To/from bathroom  Assist Level: Stand by assistance  Functional Mobility Comments: w/c used due to orthostatic  Wheelchair Bed Transfers  Wheelchair/Bed - Technique: Stand step  Equipment Used: Wheelchair  Level of Asssistance: Contact guard assistance  Bed mobility  Supine to Sit: Supervision  Sit to Supine: Supervision  Transfers  Sit to stand: Contact guard assistance  Stand to sit: Contact guard assistance                       Cognition  Overall Cognitive Status: Exceptions  Arousal/Alertness: Delayed responses to stimuli  Following Commands: Follows one step commands with repetition; Follows one step commands with increased time  Attention Span: Attends with cues to redirect; Difficulty attending to directions  Memory: Decreased recall of recent events;Decreased short term memory;Decreased long term memory;Decreased recall of biographical Information;Decreased recall of precautions  Safety Judgement: Decreased awareness of need for safety;Decreased awareness of need for assistance  Problem Solving: Assistance required to implement solutions;Assistance required to generate solutions;Assistance required to identify errors made;Assistance required to correct errors made  Insights: Decreased awareness of deficits  Initiation: Requires cues for some  Sequencing: Requires cues for some  Cognition Comment: Pt demonstarting decreased cognititon this date. Pt with increased confusion. Asking if daughter was in the bathroom. needed frequent orientation over where he was and that he has not been in different rooms during ARU stay.                        LUE AROM (degrees)  LUE AROM : WFL  LUE General AROM: approx 0-120 degrees shoulder flexion  Left Hand AROM (degrees)  Left Hand General AROM: @ 0-90 degrees shoulder flexion Plan   Plan  Times per week: 5-7x/wk, 75 min  Times per day: Daily  Current Treatment Recommendations: Strengthening, ROM, Functional Mobility Training, Endurance Training, Safety Education & Training, Self-Care / ADL, Equipment Evaluation, Education, & procurement, Patient/Caregiver Education & Training, Balance Training  G-Code      Additional treatment: Pt left in bed with all needs within reach. Pt participated in grooming task seated in w/c due to continued orthostatic hypotension. Pt required SUP seated with verbal cues for sequencing. Pt was difficult to understand this date. Pt required repeated orientation. Pt participated in lower body dressing, required Min A.  Pt CGA for sit to stand with RW and CGA for stand pivot trasnfer to w/c.   BP- at rest 114/63  91/50 EOB   87/43 after grooming       Goals  Short term goals  Time Frame for Short term goals: 2 weeks  Short term goal 1: Supervision for bathing- not met, progressing  Short term goal 2: Mod I dressing- not met, progressing  Short term goal 3: Mod I toileting- Not met progressing  Short term goal 4: Mod I for functional transfers- Not met progressing  Short term goal 5: Mod I for functional mobility- Not met progressing  Short term goal 6: Mod I grooming- Not met progressing  Long term goals  Time Frame for Long term goals : STGs= LTGs  Patient Goals   Patient goals : Go home with wife       Therapy Time   Individual Concurrent Group Co-treatment   Time In 0830, 1415         Time Out 0900, 1500         Minutes 30,45               Timed Code Treatment Minutes:  75   Total Treatment Minutes:  Kallie [address], OT

## 2021-01-22 LAB
ANION GAP SERPL CALCULATED.3IONS-SCNC: 9 MMOL/L (ref 3–16)
BUN BLDV-MCNC: 19 MG/DL (ref 7–20)
CALCIUM SERPL-MCNC: 8.5 MG/DL (ref 8.3–10.6)
CHLORIDE BLD-SCNC: 104 MMOL/L (ref 99–110)
CO2: 25 MMOL/L (ref 21–32)
CREAT SERPL-MCNC: 0.9 MG/DL (ref 0.8–1.3)
GFR AFRICAN AMERICAN: >60
GFR NON-AFRICAN AMERICAN: >60
GLUCOSE BLD-MCNC: 83 MG/DL (ref 70–99)
HCT VFR BLD CALC: 29.9 % (ref 40.5–52.5)
HEMOGLOBIN: 9.6 G/DL (ref 13.5–17.5)
MCH RBC QN AUTO: 28.9 PG (ref 26–34)
MCHC RBC AUTO-ENTMCNC: 32.2 G/DL (ref 31–36)
MCV RBC AUTO: 89.7 FL (ref 80–100)
PDW BLD-RTO: 15.1 % (ref 12.4–15.4)
PLATELET # BLD: 153 K/UL (ref 135–450)
PMV BLD AUTO: 8 FL (ref 5–10.5)
POTASSIUM SERPL-SCNC: 3 MMOL/L (ref 3.5–5.1)
RBC # BLD: 3.33 M/UL (ref 4.2–5.9)
SODIUM BLD-SCNC: 138 MMOL/L (ref 136–145)
WBC # BLD: 4.5 K/UL (ref 4–11)

## 2021-01-22 PROCEDURE — 6370000000 HC RX 637 (ALT 250 FOR IP): Performed by: PHYSICAL MEDICINE & REHABILITATION

## 2021-01-22 PROCEDURE — 94760 N-INVAS EAR/PLS OXIMETRY 1: CPT

## 2021-01-22 PROCEDURE — 92507 TX SP LANG VOICE COMM INDIV: CPT

## 2021-01-22 PROCEDURE — 80048 BASIC METABOLIC PNL TOTAL CA: CPT

## 2021-01-22 PROCEDURE — 85027 COMPLETE CBC AUTOMATED: CPT

## 2021-01-22 PROCEDURE — 2580000003 HC RX 258: Performed by: PHYSICAL MEDICINE & REHABILITATION

## 2021-01-22 PROCEDURE — 6360000002 HC RX W HCPCS: Performed by: PHYSICAL MEDICINE & REHABILITATION

## 2021-01-22 PROCEDURE — 1280000000 HC REHAB R&B

## 2021-01-22 PROCEDURE — 97530 THERAPEUTIC ACTIVITIES: CPT

## 2021-01-22 PROCEDURE — 92526 ORAL FUNCTION THERAPY: CPT

## 2021-01-22 PROCEDURE — 97110 THERAPEUTIC EXERCISES: CPT

## 2021-01-22 PROCEDURE — 36415 COLL VENOUS BLD VENIPUNCTURE: CPT

## 2021-01-22 PROCEDURE — 97129 THER IVNTJ 1ST 15 MIN: CPT

## 2021-01-22 PROCEDURE — 97116 GAIT TRAINING THERAPY: CPT

## 2021-01-22 PROCEDURE — 97535 SELF CARE MNGMENT TRAINING: CPT

## 2021-01-22 RX ORDER — POTASSIUM CHLORIDE 20 MEQ/1
40 TABLET, EXTENDED RELEASE ORAL
Status: DISCONTINUED | OUTPATIENT
Start: 2021-01-22 | End: 2021-01-25

## 2021-01-22 RX ADMIN — SODIUM CHLORIDE: 9 INJECTION, SOLUTION INTRAVENOUS at 05:38

## 2021-01-22 RX ADMIN — MIDODRINE HYDROCHLORIDE 10 MG: 5 TABLET ORAL at 13:40

## 2021-01-22 RX ADMIN — CARVEDILOL 3.12 MG: 3.12 TABLET, FILM COATED ORAL at 10:17

## 2021-01-22 RX ADMIN — FAMOTIDINE 20 MG: 20 TABLET, FILM COATED ORAL at 10:17

## 2021-01-22 RX ADMIN — CARBIDOPA AND LEVODOPA 1 TABLET: 25; 100 TABLET, ORALLY DISINTEGRATING ORAL at 11:29

## 2021-01-22 RX ADMIN — POTASSIUM CHLORIDE 40 MEQ: 1500 TABLET, EXTENDED RELEASE ORAL at 16:56

## 2021-01-22 RX ADMIN — ATORVASTATIN CALCIUM 20 MG: 20 TABLET, FILM COATED ORAL at 22:55

## 2021-01-22 RX ADMIN — CLOPIDOGREL 75 MG: 75 TABLET, FILM COATED ORAL at 10:18

## 2021-01-22 RX ADMIN — FAMOTIDINE 20 MG: 20 TABLET, FILM COATED ORAL at 22:55

## 2021-01-22 RX ADMIN — ASPIRIN 81 MG: 81 TABLET, CHEWABLE ORAL at 10:17

## 2021-01-22 RX ADMIN — MIDODRINE HYDROCHLORIDE 10 MG: 5 TABLET ORAL at 10:17

## 2021-01-22 RX ADMIN — CARBIDOPA AND LEVODOPA 1 TABLET: 25; 100 TABLET, ORALLY DISINTEGRATING ORAL at 22:56

## 2021-01-22 RX ADMIN — MIDODRINE HYDROCHLORIDE 10 MG: 5 TABLET ORAL at 16:54

## 2021-01-22 RX ADMIN — FLUDROCORTISONE ACETATE 0.2 MG: 0.1 TABLET ORAL at 11:29

## 2021-01-22 RX ADMIN — CARBIDOPA AND LEVODOPA 1 TABLET: 25; 100 TABLET, ORALLY DISINTEGRATING ORAL at 13:40

## 2021-01-22 RX ADMIN — ENOXAPARIN SODIUM 40 MG: 40 INJECTION SUBCUTANEOUS at 10:18

## 2021-01-22 ASSESSMENT — PAIN SCALES - GENERAL
PAINLEVEL_OUTOF10: 0
PAINLEVEL_OUTOF10: 0

## 2021-01-22 NOTE — PROGRESS NOTES
Kallei Ortiz  1/22/2021  [id number]    Chief Complaint: Debility    Subjective:   No overnight events. No current complaints. States he feels great currently. Orthostasis present but improved compared to prior days. ROS: No CP, SOB, dyspnea    Objective:  Patient Vitals for the past 24 hrs:   BP Temp Temp src Pulse Resp SpO2 Weight   01/22/21 0805 (!) 95/56 97.3 °F (36.3 °C) Oral 82 16 -- --   01/22/21 0505 -- -- -- -- -- -- 119 lb 11.4 oz (54.3 kg)   01/21/21 2115 (!) 166/79 97.3 °F (36.3 °C) Oral 82 18 92 % --   01/21/21 1803 (!) 173/111 -- -- 76 -- -- --     Gen: No distress, pleasant. Resting in bedside chair. Thin  HEENT: Normocephalic, atraumatic. CV: Regular rate and rhythm. No MRG   Resp: No respiratory distress. CTAB   Abd: Soft, nontender nondistended  Ext: No edema. Neuro: Alert, oriented, appropriately interactive. Higher level cognitive deficits. Laboratory data: Available via EMR. Therapy progress:  PT  Position Activity Restriction  Other position/activity restrictions: 69-year-old gentleman with history of CAD status post PCI 1/8/2021 for NSTEMI with JAMES x2 to RCA is a direct admit from outside hospital with complaint of chest pain. Troponin is mildly elevated and stable. EKG is nonspecific and unchanged from prior. Cardiology evaluation complete and patient is cleared to go home from cardiology standpoint.   Objective     Sit to Stand: Contact guard assistance  Stand to sit: Contact guard assistance  Bed to Chair: Contact guard assistance(pt walked ~6' from EoB to chair)  Device: [address]: Contact guard assistance  Distance: 2-3 steps from BS chair to [facility] and from [facility] to bed  OT  PT Equipment Recommendations  Equipment Needed: No  Other: TBD based on therapy progress  Toilet - Technique: Ambulating  Equipment Used: Grab bars  Toilet Transfers Comments: ANNEMARIE, unsafe to attempt  Assessment        SLP  Current Diet : Dysphagia Minced and Moist (Dysphagia II)  Current Liquid Diet : Thin  Diet Solids Recommendation: Dysphagia Minced and Moist (Dysphagia II)  Liquid Consistency Recommendation: Thin    Body mass index is 17.68 kg/m². Assessment:  Patient Active Problem List   Diagnosis    COPD (chronic obstructive pulmonary disease)    Coronary artery disease    Hypertension    Hyperlipidemia    Fever    SOB (shortness of breath)    Vertigo    Exertional dyspnea    Paroxysmal atrial fibrillation (HCC)    Parkinson disease ([facility] [address]    Hypotension    Chest pain    Severe malnutrition ([facility] [address]    NSTEMI (non-ST elevated myocardial infarction) ([facility] [address]    Debility       Plan:   Debility: Complicated by Parkinson. PT/OT     Dysphagia: dysphagia diet, SLP     CAD: s/p CABG and recent PCI. Coreg decreased to 3.125     Parkinson: Sinemet per home regimen     Orthostasis: midodrine increased to 10, Florinef 0.2. s/p IVF bolus. - Allow permissive HTN. - IVF      HLD: Lipitor 20     COPD: No current meds     Iron deficiency anemia: s/p supplementation     UTI: cipro completed    Hypokalemia: - supplement     Bowels: Per protocol  Bladder: Per protocol   Sleep: Trazodone provided prn  Pain: Tylenol, tramadol  DVT PPx: Lovenox    Batool Molina MD 1/22/2021, 2:23 PM    * This document was created using dictation software. While all precautions were taken to ensure accuracy, errors may have occurred. Please disregard any typographical errors.

## 2021-01-22 NOTE — PROGRESS NOTES
Hyperlipidemia, Hypertension, Osteoarthritis, Parkinson disease ([facility] [address] Prostate cancer ([facility] [address] and Rotator cuff syndrome. has a past surgical history that includes Prostatectomy (); Colonoscopy (); Coronary artery bypass graft; Cardiac surgery (); Coronary angioplasty (); Coronary angioplasty (); Cataract removal with implant (); sigmoidoscopy (N/A, 2019); and Colonoscopy (N/A, 2019). Restrictions  Restrictions/Precautions  Restrictions/Precautions: Fall Risk, Modified Diet  Required Braces or Orthoses?: No  Position Activity Restriction  Other position/activity restrictions: 78-year-old gentleman with history of CAD status post PCI 2021 for NSTEMI with JAMES x2 to RCA is a direct admit from outside hospital with complaint of chest pain. Troponin is mildly elevated and stable. EKG is nonspecific and unchanged from prior. Cardiology evaluation complete and patient is cleared to go home from cardiology standpoint. Subjective   General  Chart Reviewed:  Yes  Additional Pertinent Hx: CAD, CABG, COPD, HTN, prostate cancer, OA  Family / Caregiver Present: No  Diagnosis: Chest pain, debility  Subjective  Subjective: Pt supine in bed upon arrival, pleasant and agreeable to OT treatment session stating \"this is the best i've felt in awhile\"  General Comment  Comments: RN okay for therapy  Vital Signs  Orthostatic B/P and Pulse?: Yes  Blood Pressure Lyin/79  Pulse Lyin PER MINUTE  Blood Pressure Sittin/61  Pulse Sittin PER MINUTE  Blood Pressure Standin/43  Pulse Standin PER MINUTE  Patient Currently in Pain: No  Orthostatic B/P and Pulse?: Yes     Orientation  Orientation  Overall Orientation Status: Within Functional Limits  Orientation Level: Oriented to time;Oriented to place;Oriented to situation;Oriented to person    Objective    ADL  Grooming: Setup;Supervision(completed oral care and hair combing while seated EOB)  UE Bathing: Supervision;Setup;Verbal cueing(sponge bathing while seated EOB, verbal cueing for thoroughness)  LE Bathing: Minimal assistance;Verbal cueing(pt able to bathe BLE and front tia care, min(A) provided for thoroughness of rear tia care)  UE Dressing: Minimal assistance;Setup;Verbal cueing(gown change)  LE Dressing: Minimal assistance;Verbal cueing;Setup; Increased time to complete(pt demo ability to donnpants, socks, and  tenso shapes with min(A) to positions without wrinkles and assist provided to tie pants)  Additional Comments: BP at 114/64 HR 72 while seated EOB to complete ADLs  Balance  Sitting Balance: Supervision  Standing Balance: Contact guard assistance  Standing Balance  Time: 3 minutes  Activity: functional mobility, functional transfers, ADL completion  Comment: standing tolerance limited due to orthostatic BP once in stance  Functional Mobility  Functional - Mobility Device: Rolling Walker  Activity: Other(10' from EOB > recliner)  Assist Level: Contact guard assistance  Functional Mobility Comments: /63 HR 72 after seated rest break after ambulation  Bed mobility  Supine to Sit: Supervision  Sit to Supine: Unable to assess(pt in recliner chair at EOS)  Scooting: Supervision  Transfers  Sit to stand: Contact guard assistance  Stand to sit: Contact guard assistance  Transfer Comments: verbal cues for safety of hand placement  Cognition  Overall Cognitive Status: Exceptions  Arousal/Alertness: Delayed responses to stimuli  Following Commands: Follows one step commands with repetition; Follows one step commands with increased time  Attention Span: Attends with cues to redirect; Difficulty attending to directions  Memory: Decreased recall of recent events;Decreased short term memory;Decreased long term memory;Decreased recall of biographical Information;Decreased recall of precautions  Safety Judgement: Decreased awareness of need for safety;Decreased awareness of need for assistance  Problem Solving: Assistance required to implement solutions;Assistance required to generate solutions;Assistance required to identify errors made;Assistance required to correct errors made  Insights: Decreased awareness of deficits  Initiation: Requires cues for some  Sequencing: Requires cues for some  Cognition Comment: Pt more alert and less confused today, appropriately answering questions and oriented x4 this date  Perception  Overall Perceptual Status: [facility]           Second Session:  Pt seated in chair at entry, agreeable to OT session. Pt denied pain and declined ADL needs. BP at rest- 70/40 however pt with no signs/symptoms, BP post standing- 74/61 and 86/52. Pt completed PVC pipe table top task in stance for activity tolerance, STM, and problem solving- 3 stances to complete task, pt required increased VCs for problem solving as task progressed, Alisa for facilitation  of shoulder flexion/increased reach. Pt left seated in chair at EOS, chair alarm on, call light and needs in reach.         Plan   Plan  Times per week: 5-7x/wk, 75 min  Times per day: Daily  Current Treatment Recommendations: Strengthening, ROM, Functional Mobility Training, Endurance Training, Safety Education & Training, Self-Care / ADL, Equipment Evaluation, Education, & procurement, Patient/Caregiver Education & Training, Balance Training         Goals  Short term goals  Time Frame for Short term goals: 2 weeks  Short term goal 1: Supervision for bathing- not met, progressing  Short term goal 2: Mod I dressing- not met, progressing  Short term goal 3: Mod I toileting- Not met progressing  Short term goal 4: Mod I for functional transfers- Not met progressing  Short term goal 5: Mod I for functional mobility- Not met progressing  Short term goal 6: Mod I grooming- Not met progressing  Long term goals  Time Frame for Long term goals : STGs= LTGs  Patient Goals   Patient goals : Go home with wife       Therapy Time   Individual Concurrent Group Co-treatment Time In 0945         Time Out 1030         Minutes 45         Timed Code Treatment Minutes: [address] [facility], OT   [place], [place] [address]      Second Session Therapy Time:   Individual Concurrent Group Co-treatment   Time In 1736         Time Out 1345         Minutes 30           Timed Code Treatment Minutes:  30 + 45     Total Treatment Minutes:  [address] [facility] [address], OTR/L #[id number] (documentation and tx for 2nd session only)

## 2021-01-22 NOTE — PROGRESS NOTES
Physical Therapy  Facility/Department: [facility] ACUTE REHAB UNIT  Daily Treatment Note  NAME: Valery Taylor  : 1940  MRN: [id number]    Date of Service: 2021    Discharge Recommendations:  24 hour supervision or assist, S Level 3, Home with Home health PT   PT Equipment Recommendations  Equipment Needed: No  Other: TBD based on therapy progress    Assessment   Body structures, Functions, Activity limitations: Decreased functional mobility ; Decreased ROM; Decreased safe awareness;Decreased endurance;Decreased balance;Decreased posture;Decreased ADL status; Decreased strength  Assessment: Pt. continues to remain very limited by low BP, 1st session this date in the bed as BP 68/41 while sitting and became symptomatic with trying to toilet on BSC. Pt. continues to require skilled PT to improve funcitonal mobility. Treatment Diagnosis: impaired functional mobility  Prognosis: Good;Guarded  PT Education: PT Role;Goals;Plan of Care;General Safety;Transfer Training;Energy Conservation;Gait Training;Functional Mobility Training  Patient Education: Pt verbalizes understanding, but demonstrates slight cognitive delay so could benefit from reinforcement  Barriers to Learning: potential cognitive deficit  REQUIRES PT FOLLOW UP: Yes  Activity Tolerance  Activity Tolerance: Patient limited by fatigue;Patient limited by endurance  Activity Tolerance: Orthostatic Hypotension continues to limit patient's activity tolerance when upright     Patient Diagnosis(es): There were no encounter diagnoses. has a past medical history of CAD (coronary artery disease), COPD (chronic obstructive pulmonary disease) ([address] Coronary artery bypass, Coronary stent, Hyperlipidemia, Hypertension, Osteoarthritis, Parkinson disease ([address] Prostate cancer ([facility] [address] and Rotator cuff syndrome. has a past surgical history that includes Prostatectomy (); Colonoscopy (); Coronary artery bypass graft; Cardiac surgery ();  Coronary length. Gait Deviations: Slow Ja;Decreased step length;Decreased step height;Decreased arm swing;Decreased head and trunk rotation; Shuffles  Distance: 2-3 steps from BS chair to [facility] and from [facility] to bed  Comments: Limited ambulation d/t orthostatic hypotension     Exercises  Bridging: (x 10)  Heelslides: manual resistive heel slides, 15 x 2  Hip Abduction: manual resistive x 15 x2      Comment: 1st session:  Pt. needed brief changed d/t dirty with BM. Total A for pericare. Mod A for brief. 2nd session:  Patient ambulated 145' w/ cane and CGA/MIN assist w/ staggering, dec'd ja, and LOB x2 but able to self correct. He then ambulated 152' w/ rolling walker and SBA w/ vastly improved balance and safety, increased step length, and verbalized improved balance. Patient had no c/o dizziness during session. /63 in sitting post ambulation. Patient left sitting in chair w/ call light in reach and chair alarm on. Patient currently is most safe w/ use of RW. Goals  Short term goals  Time Frame for Short term goals: 7-14 days  Short term goal 1: Patient will perform bed mobility independently. Short term goal 2: Patient will perform bed-chair transfer MOD I w/ LRAD. Short term goal 3: Patient will ambulate 150' MOD I w/ LRAD. Short term goal 4: Patient will negotiate up/down 12 stairs w/ single railing MOD I to access 2nd floor kitchen.   Patient Goals   Patient goals : to get home    Plan    Plan  Times per week: 75 minutes/day, 5 days/week  Times per day: Daily  Current Treatment Recommendations: Strengthening, Balance Training, Functional Mobility Training, Transfer Training, Gait Training, Stair training, Home Exercise Program, Safety Education & Training, Endurance Training, Patient/Caregiver Education & Training, Equipment Evaluation, Education, & procurement, Positioning  Safety Devices  Type of devices: Gait belt, Telesitter in use, Nurse notified, All fall risk precautions in place, Patient at risk for falls, Bed alarm in place, Call light within reach, Left in bed  Restraints  Initially in place: No     Therapy Time   Individual Concurrent Group Co-treatment   Time In 0845         Time Out 0930         Minutes 45         Timed Code Treatment Minutes: 45 Minutes     Second Session Therapy Time:   Individual Concurrent Group Co-treatment   Time In 1045         Time Out 1115         Minutes 30         Timed Code Treatment Minutes:  30 minutes  Total Treatment Minutes:  75 minutes    1st sessionCharanjitPotter, [place], [id number]     2nd session:  [facility] ATC-R [id number]

## 2021-01-22 NOTE — PROGRESS NOTES
ACUTE REHAB UNIT  SPEECH/LANGUAGE PATHOLOGY      [x] Daily  [] Weekly Care Conference Note  [] Discharge    Deniz Speasr     HNF:5/61/4208  CDP:[id number]  Room #: KRM-4264/1366-71   Rehab Dx/Hx: Jessica Rodriguez [R53.81]  Date of Admit: 1/15/2021      Precautions: falls and aspirations  Home situation: Pt lives at home with several family members. Pt required assistance prior to admission   ST Dx: Oropharyngeal dysphagia; Dysarthria; Cognitive linguistic deficits     Daily Treatment Info:   Date: 1/22/2021   Tx session 1 Tx session 2   Pain Pt reported feeling good this morning  None reported. Subjective     Up in chair. RN present for morning vitals, BP low, asymptomatic. Up in chair, alert, and willing to participate in speech therapy. Continues to report feeling better today. /70 while up in chair.     Objective:  Goals     Pt will tolerate recommended diet level with no overt s/s aspiration   Trial of soft solid (chopped pancakes)   - pt with functional mastication   - demonstrated apparent improved pharyngeal tolerance; 1-2 swallows with a max of 3 swallows per presentation    - intermittent mild changes in vocal quality towards the end of the meal   - no reported globus sensation   - consumed 90% of presentation   - encouraged 2-3 hard swallows to clear solids before any liquid presentations (via tsp)   - allow chopped pancakes, discuss with dietary     Thin liquids with meal   - utilized tsp presentations while eating   - pt required 3 swallows per presentations   - delayed weak throat clear on 2/8 presentations   - tolerated the remaining, 6/8 presentations without overt s/s of aspiration   - improved posture with the meal and reduction in sip size appeared to improve his overall airway protection    Thin liquids post meal   - allowed thin liquid via cup after the meal   - pt required 4 swallows per presentation with overt signs of premature bolus loss     Recommend to utilize teaspoon presentations of thin liquid with meals, and sips from cup before/after eating   Pt demonstrating significantly improved tolerance while up in chair, pt also reported feeling more comfortable eating while up in chair. Hypotension remains as a barrier, but if able, strongly recommend chair positioning while eating. Small sips of thin liquids from cup  - Pt required an average of 4 swallows per presentation with overt signs of premature bolus loss   -Pt had delayed cough 6 out of 10 sips of thin liquids    Pt will complete a variety of swallow maneuvers in order to improve swallow function. Goal not targeted this session. Oropharyngeal strengthening:   - Chin Tuck with Resistance x 5  - Pudding through Straw x3   - Resistance against frozen bolus x3, pt able to provide adequate resistance against SLP applied resistance, initiated a swallow after 3 second hold        Pt will recall and utilize safe swallow strategies in order to improve safety during po intake. See above -Pt recalled taking smaller sips via cup, appropriately identified when he took too large of a sip       Pt will improve vocal volume and use LOUD speech across graded tasks with 80% accuracy and carry over to the conversational environment with >min cues. Goal not targeted this session. LOUD treatment:  -MPT x5: avg of   -High pitch: x5; appropriate loudness on 4/5  -Low pitch: x5; appropriate loudness on 4/5  -Sentences: DNT   -Paragraph:  DNT     Pt reported he just walked around with PT and felt he was able to maintain loud strong speech without getting stuck on his words. Patient will complete convergent and divergent naming tasks for improved thought organization with > 80% accuracy. Pt demonstrated overt loss in thought processing during structured recall/description task. Goal not targeted this session.      Pt will complete short term memory tasks and recall fx information for improved recall and carryover of information from day to day. Pt appropriately referred to written list of swallow strategies upon SLP arrival     Vaguely recalled items on list with summary version     Functional recall of orientation:   - appropriately recalled day of week, month,  and date   - required self correction of the year (2001 vs 2021)    Goal not targeted this session. Other areas targeted:     Education:   Ongoing education re rationale for tasks and plan of care. Education re allowance for chopped pancakes. Pt verbalized understanding     Ongoing education re rationale for tasks and plan of care. Pt verbalized understanding     Safety Devices: [x] Call light within reach  [x] Chair alarm activated  [] Bed alarm activated  [] Other: [x] Call light within reach  [x] Chair alarm activated  [] Bed alarm activated  [] Other:      Assessment:   Speech Therapy Diagnosis  Cognitive Diagnosis: Pt presents with mild-moderate cognitive deficits in the domains of orientation, thought organization, short term memory, and working memory. Speech Diagnosis: Pt presents with moderate dysarthria charatcerized by reduced breath support, vocal intensity, and articulatory precision. Current Diet Order: DIET DYSPHAGIA MINCED AND MOIST; No Caffeine, No Drinking Straw  Dietary Nutrition Supplements: Frozen Oral Supplement  Dietary Nutrition Supplements: Other Oral Supplement (see comment)   Recommended Form of Meds: Crushed in puree as able  Compensatory Swallowing Strategies: Alternate solids and liquids, Eat/Feed slowly, No straws, Upright as possible for all oral intake, Remain upright for 30-45 minutes after meals, Small bites/sips     Plan:     Frequency:  5days/week   30-60  Minutes/day; attempting 60 minutes of treatment, will addend POC pending pt's tolerance     Discharge Recommendations:   Barriers: long term dysphagia; nutritional compromise;     Discharge Recommendations:  [] Home independently  [x] Home with assistance []  24 hour

## 2021-01-22 NOTE — PROGRESS NOTES
Calorie Count Note    Type and Reason for Visit: Calorie Count    Intervention & Recommendations:   1. Continue Calorie Count  2. RECORD SPECIFIC PO INTAKE OF FOOD AND BEVERAGES CONSUMED      Nutrition Assessment: Based on nutrition evaluation from 1/20 and conversation with SLP, decided to begin calorie count with dinner on 1/20. Current diet and supplement order:    DIET DYSPHAGIA MINCED AND MOIST; No Caffeine, No Drinking Straw  Dietary Nutrition Supplements: Frozen Oral Supplement  Dietary Nutrition Supplements: Other Oral Supplement (see comment)    Comparative Standards (Estimated Nutrition Needs):   · Estimated Daily Total Kcal: 0948-2171; 1725 avg  · Estimated Daily Protein (g): 80     Day 1 (1/20/21- started with dinner)  Meal Calories Protein Comments   Breakfast   **data provided: Ate 0-25% of meal, picked at it. Drank 40-60cc. Lunch      Dinner **     Snacks/Supplements      Total       % Kcal needs met % Protein needs met      Day 2 (1/21/21)  Meal Calories Protein Comments   Breakfast   No documentation   Lunch      Dinner      Snacks/Supplements      Total       % Kcal needs met % Protein needs met      Day 3   Meal Calories Protein Comments   Breakfast 91 3 grams    Lunch      Dinner      Snacks/Supplements 23 1 gram    Total       % Kcal needs met % Protein needs met      Intervention & Recommendations:   1. Continue Calorie Count  2.  RECORD SPECIFIC PO INTAKE OF FOOD AND BEVERAGES CONSUMED    Electronically signed by Hang Sierra RD, LD on 1/22/2021 at 2:19 PM

## 2021-01-23 LAB
ANION GAP SERPL CALCULATED.3IONS-SCNC: 9 MMOL/L (ref 3–16)
BUN BLDV-MCNC: 17 MG/DL (ref 7–20)
CALCIUM SERPL-MCNC: 8.3 MG/DL (ref 8.3–10.6)
CHLORIDE BLD-SCNC: 105 MMOL/L (ref 99–110)
CO2: 26 MMOL/L (ref 21–32)
CREAT SERPL-MCNC: 0.9 MG/DL (ref 0.8–1.3)
GFR AFRICAN AMERICAN: >60
GFR NON-AFRICAN AMERICAN: >60
GLUCOSE BLD-MCNC: 93 MG/DL (ref 70–99)
POTASSIUM SERPL-SCNC: 3.1 MMOL/L (ref 3.5–5.1)
SODIUM BLD-SCNC: 140 MMOL/L (ref 136–145)

## 2021-01-23 PROCEDURE — 6360000002 HC RX W HCPCS: Performed by: PHYSICAL MEDICINE & REHABILITATION

## 2021-01-23 PROCEDURE — 6370000000 HC RX 637 (ALT 250 FOR IP): Performed by: PHYSICAL MEDICINE & REHABILITATION

## 2021-01-23 PROCEDURE — 80048 BASIC METABOLIC PNL TOTAL CA: CPT

## 2021-01-23 PROCEDURE — 1280000000 HC REHAB R&B

## 2021-01-23 RX ORDER — MIDODRINE HYDROCHLORIDE 5 MG/1
10 TABLET ORAL
Status: DISCONTINUED | OUTPATIENT
Start: 2021-01-23 | End: 2021-02-02 | Stop reason: HOSPADM

## 2021-01-23 RX ADMIN — FAMOTIDINE 20 MG: 20 TABLET, FILM COATED ORAL at 08:41

## 2021-01-23 RX ADMIN — FAMOTIDINE 20 MG: 20 TABLET, FILM COATED ORAL at 21:49

## 2021-01-23 RX ADMIN — CARVEDILOL 3.12 MG: 3.12 TABLET, FILM COATED ORAL at 17:11

## 2021-01-23 RX ADMIN — ATORVASTATIN CALCIUM 20 MG: 20 TABLET, FILM COATED ORAL at 21:49

## 2021-01-23 RX ADMIN — CLOPIDOGREL 75 MG: 75 TABLET, FILM COATED ORAL at 08:41

## 2021-01-23 RX ADMIN — CARVEDILOL 3.12 MG: 3.12 TABLET, FILM COATED ORAL at 08:41

## 2021-01-23 RX ADMIN — MIDODRINE HYDROCHLORIDE 10 MG: 5 TABLET ORAL at 14:42

## 2021-01-23 RX ADMIN — POTASSIUM CHLORIDE 40 MEQ: 1500 TABLET, EXTENDED RELEASE ORAL at 08:42

## 2021-01-23 RX ADMIN — CARBIDOPA AND LEVODOPA 1 TABLET: 25; 100 TABLET, ORALLY DISINTEGRATING ORAL at 08:41

## 2021-01-23 RX ADMIN — MIDODRINE HYDROCHLORIDE 10 MG: 5 TABLET ORAL at 08:41

## 2021-01-23 RX ADMIN — ASPIRIN 81 MG: 81 TABLET, CHEWABLE ORAL at 08:40

## 2021-01-23 RX ADMIN — CARBIDOPA AND LEVODOPA 1 TABLET: 25; 100 TABLET, ORALLY DISINTEGRATING ORAL at 14:42

## 2021-01-23 RX ADMIN — CARBIDOPA AND LEVODOPA 1 TABLET: 25; 100 TABLET, ORALLY DISINTEGRATING ORAL at 21:49

## 2021-01-23 RX ADMIN — FLUDROCORTISONE ACETATE 0.2 MG: 0.1 TABLET ORAL at 08:41

## 2021-01-23 RX ADMIN — ENOXAPARIN SODIUM 40 MG: 40 INJECTION SUBCUTANEOUS at 08:39

## 2021-01-23 ASSESSMENT — PAIN SCALES - GENERAL: PAINLEVEL_OUTOF10: 0

## 2021-01-23 NOTE — PROGRESS NOTES
[facility]  1/23/2021  [id number]    Chief Complaint: Debility    Subjective:   No overnight events. No current complaints. Orthostasis yesterday am but much improved in afternoon. When BP is appropriate, patient doing very well. BP lability continues. ROS: No CP, SOB, dyspnea    Objective:  Patient Vitals for the past 24 hrs:   BP Temp Temp src Pulse Resp SpO2 Weight   01/23/21 0823 (!) 152/85 98 °F (36.7 °C) Oral 69 17 -- --   01/23/21 0528 -- -- -- -- -- -- 118 lb 2.7 oz (53.6 kg)   01/22/21 2245 (!) 163/86 98.3 °F (36.8 °C) -- 79 16 -- --   01/22/21 2000 (!) 213/94 98.2 °F (36.8 °C) Oral 62 16 94 % --   01/22/21 1920 -- -- -- -- -- 92 % --     Gen: No distress, pleasant. Resting in bedside chair. Thin  HEENT: Normocephalic, atraumatic. CV: Regular rate and rhythm. No MRG   Resp: No respiratory distress. CTAB   Abd: Soft, nontender nondistended  Ext: No edema. Neuro: Alert, oriented, appropriately interactive. Higher level cognitive deficits. Laboratory data: Available via EMR. Therapy progress:  PT  Position Activity Restriction  Other position/activity restrictions: 67-year-old gentleman with history of CAD status post PCI 1/8/2021 for NSTEMI with JAMES x2 to RCA is a direct admit from outside hospital with complaint of chest pain. Troponin is mildly elevated and stable. EKG is nonspecific and unchanged from prior. Cardiology evaluation complete and patient is cleared to go home from cardiology standpoint.   Objective     Sit to Stand: Contact guard assistance  Stand to sit: Contact guard assistance  Bed to Chair: Contact guard assistance(pt walked ~6' from EoB to chair)  Device: [address]: Contact guard assistance  Distance: 2-3 steps from BS chair to [facility] and from [facility] to bed  OT  PT Equipment Recommendations  Equipment Needed: No  Other: TBD based on therapy progress  Toilet - Technique: Ambulating  Equipment Used: Grab bars  Toilet Transfers Comments: ANNEMARIE, unsafe to attempt  Assessment        SLP  Current Diet : Dysphagia Minced and Moist (Dysphagia II)  Current Liquid Diet : Thin  Diet Solids Recommendation: Dysphagia Minced and Moist (Dysphagia II)  Liquid Consistency Recommendation: Thin    Body mass index is 17.45 kg/m². Assessment:  Patient Active Problem List   Diagnosis    COPD (chronic obstructive pulmonary disease)    Coronary artery disease    Hypertension    Hyperlipidemia    Fever    SOB (shortness of breath)    Vertigo    Exertional dyspnea    Paroxysmal atrial fibrillation (HCC)    Parkinson disease ([facility] [address]    Hypotension    Chest pain    Severe malnutrition ([facility] [address]    NSTEMI (non-ST elevated myocardial infarction) ([facility] [address]    Debility       Plan:   Debility: Complicated by Parkinson. PT/OT     Dysphagia: dysphagia diet, SLP     CAD: s/p CABG and recent PCI. Coreg decreased to 3.125     Parkinson: Sinemet per home regimen     Orthostasis: midodrine increased to 10, Florinef 0.2. s/p IVF bolus. - Allow permissive HTN. - d/c IVF     HLD: Lipitor 20     COPD: No current meds     Iron deficiency anemia: s/p supplementation     UTI: cipro completed    Hypokalemia: supplement     Bowels: Per protocol  Bladder: Per protocol   Sleep: Trazodone provided prn  Pain: Tylenol, tramadol  DVT PPx: Lovenox    Yovani Knight MD 1/23/2021, 9:39 AM    * This document was created using dictation software. While all precautions were taken to ensure accuracy, errors may have occurred. Please disregard any typographical errors.

## 2021-01-24 PROCEDURE — 1280000000 HC REHAB R&B

## 2021-01-24 PROCEDURE — 6370000000 HC RX 637 (ALT 250 FOR IP): Performed by: PHYSICAL MEDICINE & REHABILITATION

## 2021-01-24 PROCEDURE — 6360000002 HC RX W HCPCS: Performed by: PHYSICAL MEDICINE & REHABILITATION

## 2021-01-24 RX ADMIN — CARBIDOPA AND LEVODOPA 1 TABLET: 25; 100 TABLET, ORALLY DISINTEGRATING ORAL at 12:53

## 2021-01-24 RX ADMIN — ENOXAPARIN SODIUM 40 MG: 40 INJECTION SUBCUTANEOUS at 08:45

## 2021-01-24 RX ADMIN — CARBIDOPA AND LEVODOPA 1 TABLET: 25; 100 TABLET, ORALLY DISINTEGRATING ORAL at 08:46

## 2021-01-24 RX ADMIN — ASPIRIN 81 MG: 81 TABLET, CHEWABLE ORAL at 08:46

## 2021-01-24 RX ADMIN — FLUDROCORTISONE ACETATE 0.2 MG: 0.1 TABLET ORAL at 08:49

## 2021-01-24 RX ADMIN — ATORVASTATIN CALCIUM 20 MG: 20 TABLET, FILM COATED ORAL at 21:25

## 2021-01-24 RX ADMIN — FAMOTIDINE 20 MG: 20 TABLET, FILM COATED ORAL at 21:25

## 2021-01-24 RX ADMIN — TRAZODONE HYDROCHLORIDE 50 MG: 50 TABLET ORAL at 21:25

## 2021-01-24 RX ADMIN — MIDODRINE HYDROCHLORIDE 10 MG: 5 TABLET ORAL at 07:02

## 2021-01-24 RX ADMIN — POTASSIUM CHLORIDE 40 MEQ: 1500 TABLET, EXTENDED RELEASE ORAL at 08:46

## 2021-01-24 RX ADMIN — MIDODRINE HYDROCHLORIDE 10 MG: 5 TABLET ORAL at 12:53

## 2021-01-24 RX ADMIN — CLOPIDOGREL 75 MG: 75 TABLET, FILM COATED ORAL at 08:46

## 2021-01-24 RX ADMIN — CARBIDOPA AND LEVODOPA 1 TABLET: 25; 100 TABLET, ORALLY DISINTEGRATING ORAL at 21:25

## 2021-01-24 RX ADMIN — CARVEDILOL 3.12 MG: 3.12 TABLET, FILM COATED ORAL at 17:26

## 2021-01-24 RX ADMIN — MIDODRINE HYDROCHLORIDE 10 MG: 5 TABLET ORAL at 17:26

## 2021-01-24 RX ADMIN — FAMOTIDINE 20 MG: 20 TABLET, FILM COATED ORAL at 08:46

## 2021-01-24 RX ADMIN — CARVEDILOL 3.12 MG: 3.12 TABLET, FILM COATED ORAL at 08:46

## 2021-01-24 ASSESSMENT — PAIN SCALES - GENERAL
PAINLEVEL_OUTOF10: 0
PAINLEVEL_OUTOF10: 0

## 2021-01-25 LAB
ANION GAP SERPL CALCULATED.3IONS-SCNC: 7 MMOL/L (ref 3–16)
BUN BLDV-MCNC: 17 MG/DL (ref 7–20)
CALCIUM SERPL-MCNC: 9 MG/DL (ref 8.3–10.6)
CHLORIDE BLD-SCNC: 101 MMOL/L (ref 99–110)
CO2: 29 MMOL/L (ref 21–32)
CREAT SERPL-MCNC: 1 MG/DL (ref 0.8–1.3)
GFR AFRICAN AMERICAN: >60
GFR NON-AFRICAN AMERICAN: >60
GLUCOSE BLD-MCNC: 89 MG/DL (ref 70–99)
HCT VFR BLD CALC: 33 % (ref 40.5–52.5)
HEMOGLOBIN: 10.9 G/DL (ref 13.5–17.5)
MCH RBC QN AUTO: 29.8 PG (ref 26–34)
MCHC RBC AUTO-ENTMCNC: 33 G/DL (ref 31–36)
MCV RBC AUTO: 90.3 FL (ref 80–100)
PDW BLD-RTO: 15 % (ref 12.4–15.4)
PLATELET # BLD: 158 K/UL (ref 135–450)
PMV BLD AUTO: 8 FL (ref 5–10.5)
POTASSIUM SERPL-SCNC: 3.9 MMOL/L (ref 3.5–5.1)
RBC # BLD: 3.65 M/UL (ref 4.2–5.9)
SODIUM BLD-SCNC: 137 MMOL/L (ref 136–145)
WBC # BLD: 5.4 K/UL (ref 4–11)

## 2021-01-25 PROCEDURE — 2580000003 HC RX 258: Performed by: PHYSICAL MEDICINE & REHABILITATION

## 2021-01-25 PROCEDURE — 97530 THERAPEUTIC ACTIVITIES: CPT

## 2021-01-25 PROCEDURE — 1280000000 HC REHAB R&B

## 2021-01-25 PROCEDURE — 97110 THERAPEUTIC EXERCISES: CPT

## 2021-01-25 PROCEDURE — 92526 ORAL FUNCTION THERAPY: CPT

## 2021-01-25 PROCEDURE — 6370000000 HC RX 637 (ALT 250 FOR IP): Performed by: PHYSICAL MEDICINE & REHABILITATION

## 2021-01-25 PROCEDURE — 80048 BASIC METABOLIC PNL TOTAL CA: CPT

## 2021-01-25 PROCEDURE — 97535 SELF CARE MNGMENT TRAINING: CPT

## 2021-01-25 PROCEDURE — 36415 COLL VENOUS BLD VENIPUNCTURE: CPT

## 2021-01-25 PROCEDURE — 97129 THER IVNTJ 1ST 15 MIN: CPT

## 2021-01-25 PROCEDURE — 97116 GAIT TRAINING THERAPY: CPT

## 2021-01-25 PROCEDURE — 94760 N-INVAS EAR/PLS OXIMETRY 1: CPT

## 2021-01-25 PROCEDURE — 6360000002 HC RX W HCPCS: Performed by: PHYSICAL MEDICINE & REHABILITATION

## 2021-01-25 PROCEDURE — 85027 COMPLETE CBC AUTOMATED: CPT

## 2021-01-25 RX ADMIN — FAMOTIDINE 20 MG: 20 TABLET, FILM COATED ORAL at 11:58

## 2021-01-25 RX ADMIN — FAMOTIDINE 20 MG: 20 TABLET, FILM COATED ORAL at 21:06

## 2021-01-25 RX ADMIN — CLOPIDOGREL 75 MG: 75 TABLET, FILM COATED ORAL at 11:58

## 2021-01-25 RX ADMIN — ASPIRIN 81 MG: 81 TABLET, CHEWABLE ORAL at 11:58

## 2021-01-25 RX ADMIN — MIDODRINE HYDROCHLORIDE 10 MG: 5 TABLET ORAL at 17:13

## 2021-01-25 RX ADMIN — CARVEDILOL 3.12 MG: 3.12 TABLET, FILM COATED ORAL at 11:58

## 2021-01-25 RX ADMIN — Medication 10 ML: at 21:06

## 2021-01-25 RX ADMIN — FLUDROCORTISONE ACETATE 0.2 MG: 0.1 TABLET ORAL at 12:00

## 2021-01-25 RX ADMIN — ATORVASTATIN CALCIUM 20 MG: 20 TABLET, FILM COATED ORAL at 21:05

## 2021-01-25 RX ADMIN — ENOXAPARIN SODIUM 40 MG: 40 INJECTION SUBCUTANEOUS at 11:59

## 2021-01-25 RX ADMIN — MIDODRINE HYDROCHLORIDE 10 MG: 5 TABLET ORAL at 07:09

## 2021-01-25 RX ADMIN — CARBIDOPA AND LEVODOPA 1 TABLET: 25; 100 TABLET, ORALLY DISINTEGRATING ORAL at 17:13

## 2021-01-25 RX ADMIN — CARBIDOPA AND LEVODOPA 1 TABLET: 25; 100 TABLET, ORALLY DISINTEGRATING ORAL at 11:58

## 2021-01-25 RX ADMIN — MIDODRINE HYDROCHLORIDE 10 MG: 5 TABLET ORAL at 11:58

## 2021-01-25 RX ADMIN — CARVEDILOL 3.12 MG: 3.12 TABLET, FILM COATED ORAL at 17:13

## 2021-01-25 RX ADMIN — ACETAMINOPHEN 650 MG: 325 TABLET ORAL at 21:06

## 2021-01-25 RX ADMIN — TRAZODONE HYDROCHLORIDE 50 MG: 50 TABLET ORAL at 21:06

## 2021-01-25 RX ADMIN — CARBIDOPA AND LEVODOPA 1 TABLET: 25; 100 TABLET, ORALLY DISINTEGRATING ORAL at 21:06

## 2021-01-25 ASSESSMENT — PAIN SCALES - GENERAL
PAINLEVEL_OUTOF10: 0

## 2021-01-25 NOTE — PLAN OF CARE
Nutrition Problem #1: Inadequate energy intake  Intervention: Food and/or Nutrient Delivery: Continue Current Diet, Modify Oral Nutrition Supplement  Nutritional Goals: PO greater than 50% at meals/supp

## 2021-01-25 NOTE — PROGRESS NOTES
Calorie Count Note    Type and Reason for Visit: Reassess    Intervention & Recommendations:   1. Continue Calorie Count  2. RECORD PO INTAKE OF ALL FOOD AND BEVERAGE CONSUMED      Nutrition Assessment:   Limited data recorded for calorie count. Pt appears to be stable with intake at meals, no improvement, yet no decline. Will continue kcal ct. Current diet and supplement order:    Dietary Nutrition Supplements: Frozen Oral Supplement  Dietary Nutrition Supplements: Other Oral Supplement (see comment)  DIET DYSPHAGIA MINCED AND MOIST; No Caffeine, No Drinking Straw    Comparative Standards (Estimated Nutrition Needs):   · Estimated Daily Total Kcal: 3478-1128; 1725 avg  · Estimated Daily Protein (g): 80     Day 1 (1/20/21- started with dinner)  Meal Calories Protein Comments   Breakfast   **data provided: Ate 0-25% of meal, picked at it. Drank 40-60cc.      Lunch      Dinner **     Snacks/Supplements      Total       % Kcal needs met % Protein needs met      Day 2 (1/21/21) No documentation    Day 3 (1/22/21)  Meal Calories Protein Comments   Breakfast 91 3 grams    Lunch No documentation     Dinner No documentation     Snacks/Supplements 23 1 gram    Total       % Kcal needs met % Protein needs met      Day 4 (1/23/21) No documentation      Day 5 (1/24/21)  Lunch % and 26-50% of Ensure pudding  No additional PO intake recorded    Day 4 (1/25/21)  Meal Calories Protein Comments   Breakfast + Boost Pudding 650 33.5     Lunch      Dinner            Total       % Kcal needs met % Protein needs met          Electronically signed by Zoe Caldwell on 1/25/2021 at 2:05 PM

## 2021-01-25 NOTE — PROGRESS NOTES
Comprehensive Nutrition Assessment    Type and Reason for Visit:  Reassess    Nutrition Recommendations/Plan:   D/c Magic cup  Increase Boost pudding to TID  Trial Ensure Enlive BID      Nutrition Assessment:  Nutrition status stable, calorie count continues with limited data to evaluate. Pt not consuming Magic cup, however, he is consuming about 50% of Boost pudding. Will continue PO intake and monitor calorie count. Malnutrition Assessment:  Malnutrition Status:  Severe malnutrition    Context:  Chronic Illness     Findings of the 6 clinical characteristics of malnutrition:  Energy Intake:  7 - 75% or less estimated energy requirements for 1 month or longer  Weight Loss:  No significant weight loss(pt reports wt loss over past 2 years)     Body Fat Loss:  7 - Severe body fat loss Orbital   Muscle Mass Loss:  7 - Severe muscle mass loss Clavicles (pectoralis & deltoids)  Fluid Accumulation:  No significant fluid accumulation     Strength:  Not Performed    Estimated Daily Nutrient Needs:  Energy (kcal):  9739-8249; Weight Used for Energy Requirements:  Current(54.3kg)     Protein (g):  (1.5-2.0g/54.3kg);  Weight Used for Protein Requirements:  Current        Fluid (ml/day):   ; Method Used for Fluid Requirements:  1 ml/kcal      Nutrition Related Findings:  LBM 1/24      Wounds:  None       Current Nutrition Therapies:    Dietary Nutrition Supplements: Frozen Oral Supplement  Dietary Nutrition Supplements: Other Oral Supplement (see comment)  DIET DYSPHAGIA MINCED AND MOIST; No Caffeine, No Drinking Straw    Anthropometric Measures:  · Height: 5' 9\" (175.3 cm)  · Current Body Weight: 120 lb (54.4 kg)     · Ideal Body Weight: 160 lbs; % Ideal Body Weight 75 %   · BMI: 17.7   · BMI Categories: Underweight (BMI less than 22) age over 72       Nutrition Diagnosis:   · Inadequate energy intake related to inadequate protein-energy intake as evidenced by intake 26-50%, BMI      Nutrition Interventions:   Food

## 2021-01-25 NOTE — PROGRESS NOTES
Occupational Therapy  Facility/Department: [facility] ACUTE REHAB UNIT  Daily Treatment Note  NAME: Aminah Rosenbaum  : 1940  MRN: [id number]    Date of Service: 2021    Discharge Recommendations:  Home with Home health OT, 24 hour supervision or assist, S Level 3  OT Equipment Recommendations  Equipment Needed: Yes  Walker: Rolling  ADL Assistive Devices: Shower Chair with back  Other: continue to assess    Assessment   Performance deficits / Impairments: Decreased functional mobility ; Decreased endurance;Decreased ADL status; Decreased safe awareness;Decreased cognition;Decreased fine motor control;Decreased ROM; Decreased strength  Assessment: Pt is not at baseline level of function and will benefit from continued OT to address the above limitations. Pt continues to be limited by low BP this date. Treatment Diagnosis: Debility and decreased ADLs due to chest pain, Parkinsons recent NSTEMI. Prognosis: Fair  History: Wife helps with ADLs and IADLs, furniture walks in home, [address] [place] [address] 151 / Modification: RW, evolving presentation  OT Education: OT Role;Plan of Care;ADL Adaptive Strategies;Transfer Training;Precautions  Patient Education: pt verbalized understanding but would benefit from reinforcement for increased carryover  Barriers to Learning: Cognition  REQUIRES OT FOLLOW UP: Yes  Activity Tolerance  Activity Tolerance: Patient Tolerated treatment well;Treatment limited secondary to medical complications (free text)  Activity Tolerance: Varying levels of BP throughout session, however, pt appeared to tolerate more this date than in previous sessions. Pt reporting he was asymptomatic during both AM and PM sessions.          Patient Diagnosis(es): Debility      has a past medical history of CAD (coronary artery disease), COPD (chronic obstructive pulmonary disease) ([facility] [address] Coronary artery bypass, Coronary stent, Hyperlipidemia, Hypertension, Osteoarthritis, Parkinson disease ([facility] [address] Prostate cancer ([facility] [address] and Rotator cuff syndrome. has a past surgical history that includes Prostatectomy (2004); Colonoscopy (2005); Coronary artery bypass graft; Cardiac surgery (1998); Coronary angioplasty (1993); Coronary angioplasty (2013); Cataract removal with implant (2015); sigmoidoscopy (N/A, 1/25/2019); and Colonoscopy (N/A, 8/30/2019). Restrictions  Restrictions/Precautions  Restrictions/Precautions: Fall Risk, Modified Diet  Required Braces or Orthoses?: No  Position Activity Restriction  Other position/activity restrictions: 76-year-old gentleman with history of CAD status post PCI 1/8/2021 for NSTEMI with JAMES x2 to RCA is a direct admit from outside hospital with complaint of chest pain. Troponin is mildly elevated and stable. EKG is nonspecific and unchanged from prior. Cardiology evaluation complete and patient is cleared to go home from cardiology standpoint. Subjective   General  Chart Reviewed: Yes  Additional Pertinent Hx: CAD, CABG, COPD, HTN, prostate cancer, OA  Family / Caregiver Present: Yes(wife in PM session)  Diagnosis: Debility  Subjective  Subjective: Pt cheerful, met in chair. Pt upset when BP drops. General Comment  Comments: RN okay for therapy  Vital Signs  Patient Currently in Pain: Denies   Orientation  Orientation  Overall Orientation Status: Within Functional Limits  Objective    ADL  Grooming: Setup;Supervision(seated due to low BP)  LE Dressing: Contact guard assistance;Verbal cueing; Increased time to complete  Toileting: Contact guard assistance  Additional Comments: BP at 104/62 while seated, 79/42 during oral care. Asymptomatic, transfered back to armchair.         Balance  Sitting Balance: Supervision  Standing Balance: Stand by assistance  Standing Balance  Time: 2 min, 3 min  Activity: functional mobility, functional transfers, ADL completion  Comment: standing tolerance limited due to orthostatic BP once in stance  Functional Mobility  Functional - Mobility Device: Rolling Walker  Activity: To/from bathroom  Assist Level: Stand by assistance  Toilet Transfers  Toilet - Technique: Ambulating  Equipment Used: Grab bars  Toilet Transfer: Minimal assistance  Toilet Transfers Comments: Increased assistance needed to transfer off toliet with use of grab bars. Wheelchair Bed Transfers  Wheelchair/Bed - Technique: Stand step  Equipment Used: Wheelchair  Level of Asssistance: Stand by assistance  Wheelchair Transfers Comments: with verbal cues  Bed mobility  Comment: Up in chair, unable to assess. Transfers  Sit to stand: Stand by assistance  Stand to sit: Stand by assistance  Transfer Comments: verbal cues for safety of hand placement and RW use. Vision  Patient Visual Report: No visual complaint reported. Cognition  Overall Cognitive Status: Exceptions  Arousal/Alertness: Delayed responses to stimuli  Following Commands: Follows one step commands with repetition; Follows one step commands with increased time  Attention Span: Attends with cues to redirect; Difficulty attending to directions  Memory: Decreased recall of recent events;Decreased short term memory;Decreased long term memory;Decreased recall of biographical Information;Decreased recall of precautions  Safety Judgement: Decreased awareness of need for safety;Decreased awareness of need for assistance  Problem Solving: Assistance required to generate solutions;Assistance required to identify errors made;Assistance required to correct errors made  Insights: Decreased awareness of deficits  Initiation: Requires cues for some  Sequencing: Requires cues for some  Cognition Comment: Pt continues to be more alert this date. Less confusion. Difficulity following commands may be due to [facility] Type of ROM/Therapeutic Exercise  Type of ROM/Therapeutic Exercise: AROM; Free weights  Comment: use of 2# free weight  Exercises  Horizontal ABduction: AROM 2x10  Horizontal ADduction: AROM 2x10  Elbow Flexion: AROM 2x10  Elbow Extension: AROM 2x10  Supination: AROM x15  Pronation: AROM x15                    Plan   Plan  Times per week: 5-7x/wk, 75 min  Times per day: Daily  Current Treatment Recommendations: Strengthening, ROM, Functional Mobility Training, Endurance Training, Safety Education & Training, Self-Care / ADL, Equipment Evaluation, Education, & procurement, Patient/Caregiver Education & Training, Balance Training  G-Code      Additional treatment session: Pt participated in toileting with CGA, but required Min A for toilet transfer. Pt transferred from a variety of surfaces requiring CGA with cues for RW use. Pt participated in UE exercises seated. BP was 105/66 at rest 92/622 with tranfers and 129/89 with TE. Pt continues to report feeling asymptomatic. Pt was left in chair with chair alarm on, all needs within reach and wife in room.      Goals  Short term goals  Time Frame for Short term goals: 2 weeks  Short term goal 1: Supervision for bathing- not met, progressing  Short term goal 2: Mod I dressing- not met, progressing  Short term goal 3: Mod I toileting- Not met progressing  Short term goal 4: Mod I for functional transfers- Not met progressing  Short term goal 5: Mod I for functional mobility- Not met progressing  Short term goal 6: Mod I grooming- Not met progressing  Long term goals  Time Frame for Long term goals : STGs= LTGs  Patient Goals   Patient goals : Go home with wife       Therapy Time   Individual Concurrent Group Co-treatment   Time In 0830, 1115         Time Out 0900, 1200         Minutes 30, 45              Timed Code Treatment Minutes:  75 min  Total Treatment Minutes:  75 min          Shamar Renee OT No

## 2021-01-25 NOTE — PROGRESS NOTES
Physical Therapy  Facility/Department: [facility] ACUTE REHAB UNIT  Daily Treatment Note  NAME: Dayanara Mustafa  : 1940  MRN: [id number]    Date of Service: 2021    Discharge Recommendations:  24 hour supervision or assist, S Level 3, Home with Home health PT   PT Equipment Recommendations  Equipment Needed: No  Other: TBD based on therapy progress    Assessment   Assessment: Pt. with much better tolerane to activity this date. Pt. BP was at lowest 78/53 and highest 124/71 this date, asymptomatic with hypotension and tolerated much more upright activity than previous days. Pt. is unsteady with ambulation and continues to benefit from skilled PT to improve funcitonal mobility. Treatment Diagnosis: impaired functional mobility  Prognosis: Good;Guarded  PT Education: PT Role;Goals;Plan of Care;General Safety;Transfer Training;Energy Conservation;Gait Training;Functional Mobility Training  Patient Education: Pt verbalizes understanding, but demonstrates slight cognitive delay so could benefit from reinforcement  Barriers to Learning: potential cognitive deficit  REQUIRES PT FOLLOW UP: Yes  Activity Tolerance  Activity Tolerance: Patient limited by endurance  Activity Tolerance: Pt. BP was at lowest 78/53 and highest 124/71 this date, asymptomatic with hypotension and tolerated much more upright activity than previous days     Patient Diagnosis(es): There were no encounter diagnoses. has a past medical history of CAD (coronary artery disease), COPD (chronic obstructive pulmonary disease) ([facility] [address] Coronary artery bypass, Coronary stent, Hyperlipidemia, Hypertension, Osteoarthritis, Parkinson disease ([facility] [address] Prostate cancer ([facility] [address] and Rotator cuff syndrome. has a past surgical history that includes Prostatectomy (); Colonoscopy (); Coronary artery bypass graft; Cardiac surgery (); Coronary angioplasty (); Coronary angioplasty ();  Cataract removal with implant (); sigmoidoscopy (N/A, assistance  Comment: step over step pattern, Pt. needed cues to clear the edge of the step going up and down. Balance  Posture: Fair(Forward flexed posture, rounded shoulders, downward gaze)  Sitting - Static: Good  Sitting - Dynamic: Good  Standing - Static: Fair  Standing - Dynamic: Fair  Exercises  Hip Flexion: seated marches x 15 reps B LEs  Knee Long Arc Quad: seated marches x 15 reps B LEs  Ankle Pumps: seated marches x 15 reps B LEs         Comment: 1st session:  Pt. completed standing exercises at the ballet bar:  marching x 15 B, hip abd/add x 15 B, heel raises x 15 B and hip ext x 15 B. BP at end of session 124/71. 2nd session:  Pt in supine in bed on arrival, sleeping, snoring, dentures on L shoulder. BP in supine:  139/75, sitting 93/58, standing 79/58, supine 156/81. HR in low 80s throughout session. Pt with intermittent incoherent speech. Performed sidelying clam shells X 10, sidelying hip abduction X 10, sidelying knee flexion/extension X 10, sidelying hip flexion/extension X 10. Pt supervision for rolling. Pt remained in R sidelying with alarm on and all needs in reach. Goals  Short term goals  Time Frame for Short term goals: 7-14 days  Short term goal 1: Patient will perform bed mobility independently. Short term goal 2: Patient will perform bed-chair transfer MOD I w/ LRAD. Short term goal 3: Patient will ambulate 150' MOD I w/ LRAD. Short term goal 4: Patient will negotiate up/down 12 stairs w/ single railing MOD I to access 2nd floor kitchen.   Patient Goals   Patient goals : to get home    Plan    Plan  Times per week: 75 minutes/day, 5 days/week  Times per day: Daily  Current Treatment Recommendations: Strengthening, Balance Training, Functional Mobility Training, Transfer Training, Gait Training, Stair training, Home Exercise Program, Safety Education & Training, Endurance Training, Patient/Caregiver Education & Training, Equipment Evaluation, Education, & procurement, Positioning  Safety Devices  Type of devices: Gait belt, Telesitter in use, Nurse notified, All fall risk precautions in place, Patient at risk for falls, Call light within reach, Chair alarm in place, Left in chair  Restraints  Initially in place: No     Therapy Time   Individual Concurrent Group Co-treatment   Time In 0910         Time Out 0955         Minutes 45         Timed Code Treatment Minutes: Randal 69, PT, [id number]  Second Session Therapy Time:   Individual Concurrent Group Co-treatment   Time In 1400         Time Out 1430         Minutes 30           Timed Code Treatment Minutes:  42+66    Total Treatment Minutes:  75  2nd session:  Chalo Harrington

## 2021-01-25 NOTE — PATIENT CARE CONFERENCE
[facility]  Inpatient Rehabilitation  Weekly Team Conference Note    Patient Name: Lauren Naik        MRN: [id number]    : 1940  (79 y.o.)  Gender: male   Referring Practitioner: Aure Bliss MD  Diagnosis: chest pain    The team conference for this patient was held on 21 at 11:00am by:  Akhil Gonzáles MD    CASE MANAGEMENT:  Assessment: Patient lives at home with is spouse, adult daughter and his grandson. Patient was referred to [facility] while in acute care. [facility] will follow patient at discharge. PHYSICAL THERAPY:    Bed Mobility:   Scooting: Supervision    Transfers:  Sit to Stand: Contact guard assistance  Stand to sit: Contact guard assistance  Bed to Chair: Contact guard assistance(pt walked ~6' from EoB to chair)  Comment: 1st session:  Pt. needed brief changed d/t dirty with BM. Total A for pericare. Mod A for brief. Ambulation 1  Surface: level tile  Device: Rolling Walker  Other Apparatus: Wheelchair follow  Assistance: Contact guard assistance, Minimal assistance  Quality of Gait: Pt slightly unsteady with forward flexed posture, slow stacey, and decreased stride length, tended to veer to the right and needed cues to correct. 1 LOB corrected with Min A by PT  Gait Deviations: Slow Stacey, Decreased step length, Decreased step height, Decreased arm swing, Decreased head and trunk rotation, Shuffles  Distance: 40' x 1 and 300'x 1  Comments: BP 78/53 after 1st ambulation, asymptomatic and BP 87/57 after second ambulation    Stairs  # Steps : 4  Stairs Height: 6\"  Rails: Bilateral  Curbs: 4\"  Assistance: Minimal assistance  Comment: MIN assist for both stairs and curb step.     QM:  Roll Left and Right  Assistance Needed: Supervision or touching assistance  CARE Score: 4  Discharge Goal: Independent  Sit to Lying  Assistance Needed: Supervision or touching assistance  CARE Score: 4  Discharge Goal: Independent  Lying to Sitting on Side of Bed  Assistance Needed: Supervision or touching assistance  CARE Score: 4  Discharge Goal: Independent  Sit to Stand  Assistance Needed: Supervision or touching assistance  CARE Score: 4  Discharge Goal: Independent  Chair/Bed-to-Chair Transfer  Assistance Needed: Supervision or touching assistance  CARE Score: 4  Discharge Goal: Independent  Car Transfer  Assistance Needed: Partial/moderate assistance  CARE Score: 3  Discharge Goal: Independent  Walk 10 Feet  Assistance Needed: Supervision or touching assistance  Physical Assistance Level: Less than 25%  Reason if not Attempted: Not attempted due to medical condition or safety concerns  CARE Score: 4  Discharge Goal: Independent  Walk 50 Feet with Two Turns  Assistance Needed: Partial/moderate assistance  CARE Score: 3  Discharge Goal: Independent  Walk 150 Feet  Assistance Needed: Supervision or touching assistance  Comment: used RW  Reason if not Attempted: Not attempted due to medical condition or safety concerns  CARE Score: 4  Discharge Goal: Independent  Walking 10 Feet on Uneven Surfaces  Assistance Needed: Partial/moderate assistance  CARE Score: 3  Discharge Goal: Independent  1 Step (Curb)  Assistance Needed: Supervision or touching assistance  Physical Assistance Level: Less than 25%  CARE Score: 4  Discharge Goal: Independent  4 Steps  Assistance Needed: Supervision or touching assistance  Physical Assistance Level: Less than 25%  CARE Score: 4  Discharge Goal: Independent  12 Steps  Comment: orthostatic hypotension  Reason if not Attempted: Not attempted due to medical condition or safety concerns  CARE Score: 88  Picking Up Object  Assistance Needed: Supervision or touching assistance  CARE Score: 4  Discharge Goal: Independent  Wheelchair Ability  Uses a Wheelchair and/or Scooter?: No    SPEECH THERAPY:    Diet Level:Dietary Nutrition Supplements: Frozen Oral Supplement  Dietary Nutrition Supplements: Other Oral Supplement (see comment)  DIET DYSPHAGIA MINCED AND MOIST;  No Caffeine, No Drinking Straw    Assessment: Pt progressing towards goals. Improving tolerance of dysphagia diet with upright posture and use of compensatory strategies (use of tsp for liquids while eating, single sips, upright). Pt continues to have mild cognitive linguistic deficits (memory, thought organization) and dysarthria which are likely baseline, related to Parkinsons. With treatment, pt is improving his awareness and ability to adapt LOUD speech and carry over orientation information between sessions. OCCUPATIONAL THERAPY:    ADL:   ADL  Feeding: Setup(assist to setup lunch this date)  Grooming: Setup, Supervision(completed oral care and hair combing while seated EOB)  UE Bathing: Supervision, Setup, Verbal cueing(sponge bathing while seated EOB, verbal cueing for thoroughness)  LE Bathing: Minimal assistance, Verbal cueing(pt able to bathe BLE and front tia care, min(A) provided for thoroughness of rear tia care)  UE Dressing: Minimal assistance, Setup, Verbal cueing(gown change)  LE Dressing: Minimal assistance, Verbal cueing, Setup, Increased time to complete(pt demo ability to donnpants, socks, and  tenso shapes with min(A) to positions without wrinkles and assist provided to tie pants)  Toileting: Stand by assistance(during BM; pt limited by low bP)  Additional Comments: BP at 114/64 HR 72 while seated EOB to complete ADLs    Toilet Transfers:   Toilet Transfers  Toilet - Technique: Ambulating  Equipment Used: Grab bars  Toilet Transfer: Contact guard assistance  Toilet Transfers Comments: ANNEMARIE, unsafe to attempt    Tub/ShowerTransfers:     Shower Transfers  Shower Transfers Comments: ANNEMARIE, unsafe to attempt    QM:  Eating  Assistance Needed: Partial/moderate assistance(patient plays with his food and puts it into a cup)  CARE Score: 3  Discharge Goal: Independent  Oral Hygiene  Assistance Needed: Setup or clean-up assistance  CARE Score: 5  Discharge Goal: [address] Needed: Setup or clean-up assistance  Comment: RN maicol with the urinal   CARE Score: 5  Discharge Goal: Independent  Toilet Transfer  Assistance Needed: Supervision or touching assistance  Comment: (did not do this shift)  CARE Score: 4  Discharge Goal: Independent  Shower/Bathe Self  Assistance Needed: Partial/moderate assistance  CARE Score: 3  Discharge Goal: Supervision or touching assistance  Upper Body Dressing  Assistance Needed: Partial/moderate assistance  CARE Score: 3  Discharge Goal: Independent  Lower Body Dressing  Assistance Needed: Partial/moderate assistance  CARE Score: 3  Discharge Goal: Independent  Putting On/Taking Off Footwear  Assistance Needed: Substantial/maximal assistance  CARE Score: 2  Discharge Goal: Independent    NUTRITION:  Weight: 120 lb 2.4 oz (54.5 kg) / Body mass index is 17.74 kg/m². Diet Order:Dysphagia minced & moist  Supplements:Boost pudding TID; Ensure Enlive BID    Please see nutrition note for details.     NURSING:    Maisie Bence Fall Risk Score: 60  Wounds/Incisions/Ulcers: Surgical incision in the groin   Medication Review: with patient   Pain: denies   Consultations/Labs/X-rays:CBC EVERY MWF      BMP EVERY MWF      Risk for Readmission: 14%    Patient/Family Education provided by team:    Discharge Plan   Estimated Length of Stay:7 days  Destination: home health  Pass:No  Services at Discharge: HHPT/OT S3  Equipment at Discharge: Shower Chair with Back  Factors facilitating achievement of predicted outcomes: pleasant, cooperative  Barriers to the achievement of predicted outcomes: low BP, cognition   Patient Goals:to get home, Go home with wife    Plan of Care  Interdisciplinary Individualized Plan of Care Review:    Continue Current Plan of Care:  Yes  Modifications:_____________________________    Rehab Team Members in attendance for Team Conference:  MARYAN Loredo, [address] [place] [address], RD, LD    Yamileth Yuan, NOEMÍR/ROBERT Mosher, PT, DPT    Mary Da Silva Mica Mathews., [facility] RN    Eamon Lee PT, DPT,     I approve the established interdisciplinary plan of care as documented within the medical record of Valery Taylor.     Ngozi Dejesus MD  Electronically signed by Ngozi Dejesus MD on 1/26/2021 at 12:18 PM

## 2021-01-25 NOTE — FLOWSHEET NOTE
Called into the patient's room BP 60/40 patient stated that he felt fine, then all of a sudden patient stated that something was wrong, put patient in bed in Trendelenburg and BP now 100/57,     BP now 113/64 @ 1312 pulse 76         1314   -/62    1316 now reports he is feeling better  /61  P 73

## 2021-01-25 NOTE — PROGRESS NOTES
with swallow during lunch. -Pt took one sip of thin liquid (ginger ale) via cup with no overt s/s of aspiration. Pt will complete a variety of swallow maneuvers in order to improve swallow function.   -Pt utilized hard swallow independently during thin liquid via tsp. Oropharyngeal strengthening:   - Chin Tuck with Resistance x 10     -Pt able to hold rolled up    washcloth under chin    independently. This is an     improvement from last week. - Pudding through Straw x 2     (average time: 2:00 minutes); only able to propel approximately 3/4 length of straw; reported discomfort in gums with completion   - Resistance against frozen bolus x 2, pt able to provide adequate resistance against SLP applied resistance, initiated a swallow after 3 second hold. Pt will recall and utilize safe swallow strategies in order to improve safety during po intake. Recall of hard swallow   - Independently      Recall of tsp use for liquids while eating   - min cues; initially requested use of cup; accepted teaspoon use while eating with ongoing education    -Pt stated that he used both sips from cup and tsp from cup with his lunch today. Pt reported no problems with swallow during lunch. Pt will improve vocal volume and use LOUD speech across graded tasks with 80% accuracy and carry over to the conversational environment with >min cues. Goal not targeted this session. Goal not targeted this session. Patient will complete convergent and divergent naming tasks for improved thought organization with > 80% accuracy. Goal not targeted this session. Goal not targeted this session. Pt will complete short term memory tasks and recall fx information for improved recall and carryover of information from day to day.    Functional recall   - discharge date: reported he was unsure of his planned d/c date   - current date: 2 days off exact date, required min cues for use of digital calendar to correct   - medical situation (blood pressure): appropriately recalled having fluctuating blood pressures and the importance of regulating    Goal not targeted this session. Other areas targeted:     Education:   Ongoing education re rationale for speech tasks and POC. Pt v/u. Ongoing education re rationale for speech tasks, blood pressure levels, and POC. Pt v/u. Safety Devices: [x] Call light within reach  [x] Chair alarm activated  [] Bed alarm activated  [] Other: [x] Call light within reach  [x] Chair alarm activated  [] Bed alarm activated  [] Other:      Assessment:   Speech Therapy Diagnosis  Cognitive Diagnosis: Pt presents with mild-moderate cognitive deficits in the domains of orientation, thought organization, short term memory, and working memory. Speech Diagnosis: Pt presents with moderate dysarthria charatcerized by reduced breath support, vocal intensity, and articulatory precision. Current Diet Order: Dietary Nutrition Supplements: Frozen Oral Supplement  Dietary Nutrition Supplements: Other Oral Supplement (see comment)  DIET DYSPHAGIA MINCED AND MOIST; No Caffeine, No Drinking Straw   Recommended Form of Meds: Crushed in puree as able  Compensatory Swallowing Strategies: Alternate solids and liquids, Eat/Feed slowly, No straws, Upright as possible for all oral intake, Remain upright for 30-45 minutes after meals, Small bites/sips     Plan:     Frequency:  5days/week   30-60  Minutes/day; attempting 60 minutes of treatment, will addend POC pending pt's tolerance     Discharge Recommendations:   Barriers: long term dysphagia; nutritional compromise;     Discharge Recommendations:  [] Home independently  [x] Home with assistance []  24 hour supervision  [] SNF [] Other:  Continued SLP Treatment:  [x] Yes [] No [] TBD based on progress while on ARU [] Vital Stim indicated [] Other:   Estimated discharge date: 2/2/21    Type of Total Treatment Minutes   Session 1   Session 2   Time In 0800 1245   Time Out 0830 1315   Timed Code Minutes  10    Individual Treatment Minutes  30 30   Co-Treatment Minutes      Group Treatment Minutes      Concurrent Treatment Minutes        TOTAL DAILY MINUTES:  60    Electronically Signed by   Yazan Calabrese M.A. CCC-SLP ARGELIA [id number]  Speech-Language Pathologist [phone]  1/25/2021 8:25 AM     The speech-language pathologist was present, directed the patient's care, made skilled judgment and was responsible for assessment and treatment.     AALIYAH Finley.,  Speech-Language Pathology

## 2021-01-25 NOTE — PROGRESS NOTES
(Dysphagia II)  Current Liquid Diet : Thin  Diet Solids Recommendation: Dysphagia Minced and Moist (Dysphagia II)  Liquid Consistency Recommendation: Thin    Body mass index is 17.74 kg/m². Assessment:  Patient Active Problem List   Diagnosis    COPD (chronic obstructive pulmonary disease)    Coronary artery disease    Hypertension    Hyperlipidemia    Fever    SOB (shortness of breath)    Vertigo    Exertional dyspnea    Paroxysmal atrial fibrillation (HCC)    Parkinson disease ([address]    Hypotension    Chest pain    Severe malnutrition ([facility] [address]    NSTEMI (non-ST elevated myocardial infarction) ([facility] [address]    Debility       Plan:   Debility: Complicated by Parkinson. PT/OT     Dysphagia: dysphagia diet, SLP     CAD: s/p CABG and recent PCI. Coreg decreased to 3.125     Parkinson: Sinemet per home regimen     Orthostasis: midodrine increased to 10, Florinef 0.2. s/p IVF bolus. Allow permissive HTN. S/p IVF     HLD: Lipitor 20     COPD: No current meds     Iron deficiency anemia: s/p supplementation     UTI: cipro completed    Hypokalemia: supplement - d/c, resolved     Bowels: Per protocol  Bladder: Per protocol   Sleep: Trazodone provided prn  Pain: Tylenol, tramadol  DVT PPx: Lovenox    Aayush Ospina MD 1/25/2021, 9:04 AM    * This document was created using dictation software. While all precautions were taken to ensure accuracy, errors may have occurred. Please disregard any typographical errors.

## 2021-01-26 PROCEDURE — 97530 THERAPEUTIC ACTIVITIES: CPT

## 2021-01-26 PROCEDURE — 92507 TX SP LANG VOICE COMM INDIV: CPT

## 2021-01-26 PROCEDURE — 1280000000 HC REHAB R&B

## 2021-01-26 PROCEDURE — 97110 THERAPEUTIC EXERCISES: CPT

## 2021-01-26 PROCEDURE — 97535 SELF CARE MNGMENT TRAINING: CPT

## 2021-01-26 PROCEDURE — 97129 THER IVNTJ 1ST 15 MIN: CPT

## 2021-01-26 PROCEDURE — 92526 ORAL FUNCTION THERAPY: CPT

## 2021-01-26 PROCEDURE — 97116 GAIT TRAINING THERAPY: CPT

## 2021-01-26 PROCEDURE — 2580000003 HC RX 258: Performed by: PHYSICAL MEDICINE & REHABILITATION

## 2021-01-26 PROCEDURE — 6360000002 HC RX W HCPCS: Performed by: PHYSICAL MEDICINE & REHABILITATION

## 2021-01-26 PROCEDURE — 6370000000 HC RX 637 (ALT 250 FOR IP): Performed by: PHYSICAL MEDICINE & REHABILITATION

## 2021-01-26 RX ADMIN — Medication 10 ML: at 22:38

## 2021-01-26 RX ADMIN — MIDODRINE HYDROCHLORIDE 10 MG: 5 TABLET ORAL at 13:49

## 2021-01-26 RX ADMIN — ATORVASTATIN CALCIUM 20 MG: 20 TABLET, FILM COATED ORAL at 22:37

## 2021-01-26 RX ADMIN — CARBIDOPA AND LEVODOPA 1 TABLET: 25; 100 TABLET, ORALLY DISINTEGRATING ORAL at 22:36

## 2021-01-26 RX ADMIN — CLOPIDOGREL 75 MG: 75 TABLET, FILM COATED ORAL at 08:28

## 2021-01-26 RX ADMIN — TRAMADOL HYDROCHLORIDE 50 MG: 50 TABLET, FILM COATED ORAL at 13:55

## 2021-01-26 RX ADMIN — CARVEDILOL 3.12 MG: 3.12 TABLET, FILM COATED ORAL at 16:32

## 2021-01-26 RX ADMIN — ASPIRIN 81 MG: 81 TABLET, CHEWABLE ORAL at 08:27

## 2021-01-26 RX ADMIN — FLUDROCORTISONE ACETATE 0.2 MG: 0.1 TABLET ORAL at 08:28

## 2021-01-26 RX ADMIN — FAMOTIDINE 20 MG: 20 TABLET, FILM COATED ORAL at 22:37

## 2021-01-26 RX ADMIN — MIDODRINE HYDROCHLORIDE 10 MG: 5 TABLET ORAL at 06:11

## 2021-01-26 RX ADMIN — CARBIDOPA AND LEVODOPA 1 TABLET: 25; 100 TABLET, ORALLY DISINTEGRATING ORAL at 13:49

## 2021-01-26 RX ADMIN — CARVEDILOL 3.12 MG: 3.12 TABLET, FILM COATED ORAL at 08:27

## 2021-01-26 RX ADMIN — TRAZODONE HYDROCHLORIDE 50 MG: 50 TABLET ORAL at 22:37

## 2021-01-26 RX ADMIN — ACETAMINOPHEN 650 MG: 325 TABLET ORAL at 22:37

## 2021-01-26 RX ADMIN — CARBIDOPA AND LEVODOPA 1 TABLET: 25; 100 TABLET, ORALLY DISINTEGRATING ORAL at 08:28

## 2021-01-26 RX ADMIN — ENOXAPARIN SODIUM 40 MG: 40 INJECTION SUBCUTANEOUS at 08:27

## 2021-01-26 RX ADMIN — FAMOTIDINE 20 MG: 20 TABLET, FILM COATED ORAL at 08:28

## 2021-01-26 ASSESSMENT — PAIN SCALES - GENERAL
PAINLEVEL_OUTOF10: 0

## 2021-01-26 NOTE — PLAN OF CARE
C/o generalized pain after therapy, took ultram (States tylenol is not effective). Ok'd Coreg to be given w/ sBP<100 per Dr. Doris Adamson.  Reports bowels moving yesterday    Problem: Falls - Risk of:  Goal: Will remain free from falls  Description: Will remain free from falls  Outcome: Ongoing  Goal: Absence of physical injury  Description: Absence of physical injury  Outcome: Ongoing     Problem: Skin Integrity:  Goal: Will show no infection signs and symptoms  Description: Will show no infection signs and symptoms  Outcome: Ongoing  Goal: Absence of new skin breakdown  Description: Absence of new skin breakdown  Outcome: Ongoing     Problem: Nutrition  Goal: Optimal nutrition therapy  Outcome: Ongoing

## 2021-01-26 NOTE — PROGRESS NOTES
Lillian Temple  1/26/2021  [id number]    Chief Complaint: Debility    Subjective:   No overnight events. No current complaints. Orthostatsis yesterday but asymptomatic with dramatic functional improvement. ROS: No CP, SOB, dyspnea    Objective:  Patient Vitals for the past 24 hrs:   BP Temp Temp src Pulse Resp SpO2 Height Weight   01/26/21 0818 (!) 95/57 97.3 °F (36.3 °C) Axillary 82 18 96 % -- --   01/26/21 0600 (!) 167/89 97.8 °F (36.6 °C) Oral 67 16 96 % -- 120 lb 5.9 oz (54.6 kg)   01/25/21 2100 125/75 97.9 °F (36.6 °C) Oral 66 16 92 % -- --   01/25/21 1501 -- -- -- -- -- 96 % -- --   01/25/21 1341 -- -- -- -- -- -- 5' 9\" (1.753 m) --   01/25/21 1300 (!) 100/57 -- -- -- -- -- -- --     Gen: No distress, pleasant. Resting in bed. Thin  HEENT: Normocephalic, atraumatic. CV: Regular rate and rhythm. No MRG   Resp: No respiratory distress. CTAB   Abd: Soft, nontender nondistended  Ext: No edema. Neuro: Alert, oriented, appropriately interactive. Higher level cognitive deficits. Laboratory data: Available via EMR. Therapy progress:  PT  Position Activity Restriction  Other position/activity restrictions: 71-year-old gentleman with history of CAD status post PCI 1/8/2021 for NSTEMI with JAMES x2 to RCA is a direct admit from outside hospital with complaint of chest pain. Troponin is mildly elevated and stable. EKG is nonspecific and unchanged from prior. Cardiology evaluation complete and patient is cleared to go home from cardiology standpoint.   Objective     Sit to Stand: Contact guard assistance  Stand to sit: Contact guard assistance  Bed to Chair: Contact guard assistance(pt walked ~6' from EoB to chair)  Device: Rolling Walker  Other Apparatus: Wheelchair follow  Assistance: Contact guard assistance, Minimal assistance  Distance: 40' x 1 and 300'x 1 and 200' x 1  OT  PT Equipment Recommendations  Equipment Needed: No  Other: TBD based on therapy progress  Toilet - Technique: Ambulating  Equipment Used: Grab bars  Toilet Transfers Comments: Increased assistance needed to transfer off toliet with use of grab bars. Assessment        SLP  Current Diet : Dysphagia Minced and Moist (Dysphagia II)  Current Liquid Diet : Thin  Diet Solids Recommendation: Dysphagia Minced and Moist (Dysphagia II)  Liquid Consistency Recommendation: Thin    Body mass index is 17.78 kg/m². Assessment:  Patient Active Problem List   Diagnosis    COPD (chronic obstructive pulmonary disease)    Coronary artery disease    Hypertension    Hyperlipidemia    Fever    SOB (shortness of breath)    Vertigo    Exertional dyspnea    Paroxysmal atrial fibrillation (HCC)    Parkinson disease ([facility] [address]    Hypotension    Chest pain    Severe malnutrition ([facility] [address]    NSTEMI (non-ST elevated myocardial infarction) ([facility] [address]    Debility       Plan:   Debility: Complicated by Parkinson. PT/OT     Dysphagia: dysphagia diet, SLP     CAD: s/p CABG and recent PCI. Coreg decreased to 3.125     Parkinson: Sinemet per home regimen     Orthostasis: midodrine increased to 10, Florinef 0.2. s/p IVF bolus. Allow permissive HTN. S/p IVF     HLD: Lipitor 20     COPD: No current meds     Iron deficiency anemia: s/p supplementation     UTI: cipro completed    Hypokalemia: supplemented, resolved     Bowels: Per protocol  Bladder: Per protocol   Sleep: Trazodone provided prn  Pain: Tylenol, tramadol  DVT PPx: Lovenox    Team conference was held today on the patient and discussed directly with the patient utilizing their entire treatment team. Please see separate team note for details. Total treatment time for today's care >34 min. Jorge Luis Portillo MD 1/26/2021, 9:01 AM    * This document was created using dictation software. While all precautions were taken to ensure accuracy, errors may have occurred. Please disregard any typographical errors.

## 2021-01-26 NOTE — PROGRESS NOTES
Calorie Count Note    Type and Reason for Visit: Reassess    Intervention & Recommendations:   1. D/c kcal ct    Nutrition Assessment:   Limited data recorded for calorie count. Pt appears to be stable with intake at meals, no improvement, yet no decline. Pt reports is eating at least 50% of most meals. Will stop kcal ct. Current diet and supplement order:    DIET DYSPHAGIA MINCED AND MOIST; No Caffeine, No Drinking Straw  Dietary Nutrition Supplements: Other Oral Supplement (see comment)  Dietary Nutrition Supplements: Standard High Calorie Oral Supplement    Comparative Standards (Estimated Nutrition Needs):   · Estimated Daily Total Kcal: 5957-3236; 1725 avg  · Estimated Daily Protein (g): 80     Day 1 (1/20/21- started with dinner)  Meal Calories Protein Comments   Breakfast   **data provided: Ate 0-25% of meal, picked at it. Drank 40-60cc.      Lunch      Dinner **     Snacks/Supplements      Total       % Kcal needs met % Protein needs met      Day 2 (1/21/21) No documentation    Day 3 (1/22/21)  Meal Calories Protein Comments   Breakfast 91 3 grams    Lunch No documentation     Dinner No documentation     Snacks/Supplements 23 1 gram    Total       % Kcal needs met % Protein needs met      Day 4 (1/23/21) No documentation      Day 5 (1/24/21)  Lunch % and 26-50% of Ensure pudding  No additional PO intake recorded    Day 6 (1/25/21)  Meal Calories Protein Comments   Breakfast + Boost Pudding 650 33.5     Lunch 675 36    Dinner - -          Total 1325 69.5     77% Kcal needs met 87% Protein needs met        Day 7 (1/26)  Meal Calories Protein Comments   Breakfast + Boost Pudding 125 5     Lunch      Dinner            Total       % Kcal needs met % Protein needs met        Electronically signed by Sravanthi Braun RD, LD on 1/26/2021 at 1:41 PM

## 2021-01-26 NOTE — PROGRESS NOTES
Occupational Therapy  Facility/Department: [facility] ACUTE REHAB UNIT  Daily Treatment Note  NAME: Carolina Atwood  : 1940  MRN: [id number]    Date of Service: 2021    Discharge Recommendations:  Home with Home health OT, 24 hour supervision or assist, S Level 3  OT Equipment Recommendations  Equipment Needed: Yes  Mobility Devices: ADL Assistive Devices  Walker: Rolling  ADL Assistive Devices: Shower Chair with back  Other: continue to assess    Assessment   Performance deficits / Impairments: Decreased functional mobility ; Decreased endurance;Decreased ADL status; Decreased safe awareness;Decreased cognition;Decreased fine motor control;Decreased ROM; Decreased strength  Assessment: Pt is not at baseline level of function and will benefit from continued OT to address the above limitations. Pt continues to be limited by low BP this date. Treatment Diagnosis: Debility and decreased ADLs due to chest pain, Parkinsons recent NSTEMI. Prognosis: Fair  OT Education: OT Role;Plan of Care;ADL Adaptive Strategies;Transfer Training;Precautions  Patient Education: pt verbalized understanding but would benefit from reinforcement for increased carryover  Barriers to Learning: Cognition  REQUIRES OT FOLLOW UP: Yes  Activity Tolerance  Activity Tolerance: Patient Tolerated treatment well;Treatment limited secondary to medical complications (free text)  Activity Tolerance: BP at 114/73 HR 70 in recliner, 113/71 HR 64 while seated in w/c, 86/46 HR 70 after ambulation back from bathroom. Pt very agitated about low BP stating \"I think the machine is wrong\". Safety Devices  Safety Devices in place: Yes  Type of devices: Call light within reach; Patient at risk for falls; All fall risk precautions in place;Nurse notified; Left in chair;Chair alarm in place  Restraints  Initially in place: No         Patient Diagnosis(es): There were no encounter diagnoses.       has a past medical history of CAD (coronary artery disease), COPD (chronic obstructive pulmonary disease) ([facility] [address] Coronary artery bypass, Coronary stent, Hyperlipidemia, Hypertension, Osteoarthritis, Parkinson disease ([facility] [address] Prostate cancer ([facility] [address] and Rotator cuff syndrome. has a past surgical history that includes Prostatectomy (2004); Colonoscopy (2005); Coronary artery bypass graft; Cardiac surgery (1998); Coronary angioplasty (1993); Coronary angioplasty (2013); Cataract removal with implant (2015); sigmoidoscopy (N/A, 1/25/2019); and Colonoscopy (N/A, 8/30/2019). Restrictions  Restrictions/Precautions  Restrictions/Precautions: Fall Risk, Modified Diet  Required Braces or Orthoses?: No  Position Activity Restriction  Other position/activity restrictions: 67-year-old gentleman with history of CAD status post PCI 1/8/2021 for NSTEMI with JAMES x2 to RCA is a direct admit from outside hospital with complaint of chest pain. Troponin is mildly elevated and stable. EKG is nonspecific and unchanged from prior. Cardiology evaluation complete and patient is cleared to go home from cardiology standpoint. Subjective   General  Chart Reviewed: Yes  Additional Pertinent Hx: CAD, CABG, COPD, HTN, prostate cancer, OA  Family / Caregiver Present: No  Diagnosis: Debility  Subjective  Subjective: Pt in recliner chair, frustrated about blood pressure but agreeable to OT treatment session  General Comment  Comments: RN okay for therapy  Vital Signs  Patient Currently in Pain: Denies     Orientation  Orientation  Overall Orientation Status: Within Functional Limits    Objective    ADL  Grooming: Setup;Supervision(oral care, face washing, shaving, and hair combing completed while seated at w/c level d/t low blood pressure)  UE Bathing: Supervision;Setup;Verbal cueing(washed hair at w/c level this date, verbal cueing for thoroughness)  LE Dressing: Contact guard assistance;Verbal cueing; Increased time to complete(to riri pants)  Balance  Sitting Balance: Supervision  Standing Balance: Stand by assistance  Standing Balance  Time: 4 minutes total  Activity: functional mobility, functional transfers, ADL completion, exercises in stance at recliner  Comment: standing tolerance limited due to orthostatic BP once in stance, pt completed BLE flexion/extension while in stance, glute squeezes, alteranting shoulder flexion/extension in attempts to complete more standing and increase BP  Functional Mobility  Functional - Mobility Device: Rolling Walker  Activity: To/from bathroom(from bathroom, pt assisted into bathroom with w/c)  Assist Level: Stand by assistance  Functional Mobility Comments: BP 86/46 HR 70 after ambulation and exercises  Bed mobility  Comment: unable to assess pt in recliner chair at SOS and EOS  Transfers  Sit to stand: Stand by assistance  Stand to sit: Stand by assistance  Transfer Comments: verbal cues for safety of hand placement and RW use. Cognition  Overall Cognitive Status: Exceptions  Arousal/Alertness: Delayed responses to stimuli  Following Commands: Follows one step commands with repetition; Follows one step commands with increased time  Attention Span: Attends with cues to redirect; Difficulty attending to directions  Memory: Decreased recall of recent events;Decreased short term memory;Decreased long term memory;Decreased recall of biographical Information;Decreased recall of precautions  Safety Judgement: Decreased awareness of need for safety;Decreased awareness of need for assistance  Problem Solving: Assistance required to generate solutions;Assistance required to identify errors made;Assistance required to correct errors made  Insights: Decreased awareness of deficits  Initiation: Requires cues for some  Sequencing: Requires cues for some  Perception  Overall Perceptual Status: WFL       Second Session:  Pt in recliner chair upon arrival, pleasant and agreeable to OT treatment session.  Pt denied signs of dizziness or lightheaded and completed sit>stand transfer with SBA followed by functional mobility to w/c with use of RW and SBA. In w/c pt's BP at 94/58 HR 66. Pt assisted to gym in w/c and completed the following UE therapeutic exercise while seated for strengthening and endurance:  1) AROM flexion/extension 2x10 to 90 degrees d/t decreased ROM  2) AROM horizontal abduction/adduction 2x10   3) chest pass with use of red therapy ball 3x10  Pt assisted back to room in w/c where pt completed functional mobility back to recliner chair once in room with use of RW and SBA. Pt completed sit>supine transfer with supervision and HOB elevated. Pt left in bed, call light near, chair alarm on, and all needs met.      Plan   Plan  Times per week: 5-7x/wk, 75 min  Times per day: Daily  Current Treatment Recommendations: Strengthening, ROM, Functional Mobility Training, Endurance Training, Safety Education & Training, Self-Care / ADL, Equipment Evaluation, Education, & procurement, Patient/Caregiver Education & Training, Balance Training         Goals  Short term goals  Time Frame for Short term goals: 2 weeks  Short term goal 1: Supervision for bathing- not met, progressing  Short term goal 2: Mod I dressing- not met, progressing  Short term goal 3: Mod I toileting- Not met progressing  Short term goal 4: Mod I for functional transfers- Not met progressing  Short term goal 5: Mod I for functional mobility- Not met progressing  Short term goal 6: Mod I grooming- Not met progressing  Long term goals  Time Frame for Long term goals : STGs= LTGs  Patient Goals   Patient goals : Go home with wife       Therapy Time   Individual Concurrent Group Co-treatment   Time In 1015, 1315         Time Out 1100, 1345         Minutes 45, 30           Timed Code Treatment Minutes:  30 + 45   Total Treatment Minutes:  [address], [address] [place] [id number]      Minutes edited on 1/27/2021 to reflect accurate tx times completed by above therapist on 1/26/2021 POLO Evans/ROBERT #[id number]

## 2021-01-26 NOTE — PROGRESS NOTES
Physical Therapy  Facility/Department: [facility] ACUTE REHAB UNIT  Daily Treatment Note  NAME: Carolina Atwood  : 1940  MRN: [id number]    Date of Service: 2021    Discharge Recommendations:  24 hour supervision or assist, S Level 3, Home with Home health PT   PT Equipment Recommendations  Equipment Needed: No    Assessment   Body structures, Functions, Activity limitations: Decreased functional mobility ; Decreased ROM; Decreased safe awareness;Decreased endurance;Decreased balance;Decreased posture;Decreased ADL status; Decreased strength  Assessment: Therapy sessions variable dependent on BP. Morning session BP very low and continued dropping with activity and pt symptomatic. Later session BP higher at rest and did not drop as much allowing pt to function better with walking/stairs. Continued CGA more for safety with history of BP dropping, though no falls or LOB have occurred during therapy sessions. Treatment Diagnosis: impaired functional mobility  Prognosis: Guarded  PT Education: PT Role;Goals;Plan of Care;General Safety;Transfer Training;Energy Conservation;Gait Training;Functional Mobility Training  Patient Education: Pt verbalizes understanding, but demonstrates slight cognitive delay so could benefit from reinforcement  Barriers to Learning: potential cognitive deficit  REQUIRES PT FOLLOW UP: Yes  Activity Tolerance  Activity Tolerance: Other  Activity Tolerance: 1st session limited by low symptomatic BP     Patient Diagnosis(es): There were no encounter diagnoses. has a past medical history of CAD (coronary artery disease), COPD (chronic obstructive pulmonary disease) ([address] Coronary artery bypass, Coronary stent, Hyperlipidemia, Hypertension, Osteoarthritis, Parkinson disease ([address] Prostate cancer ([facility] [address] and Rotator cuff syndrome. has a past surgical history that includes Prostatectomy (); Colonoscopy (); Coronary artery bypass graft; Cardiac surgery ();  Coronary angioplasty (1993); Coronary angioplasty (2013); Cataract removal with implant (2015); sigmoidoscopy (N/A, 1/25/2019); and Colonoscopy (N/A, 8/30/2019). Restrictions  Restrictions/Precautions  Restrictions/Precautions: Fall Risk, Modified Diet  Required Braces or Orthoses?: No  Position Activity Restriction  Other position/activity restrictions: 78-year-old gentleman with history of CAD status post PCI 1/8/2021 for NSTEMI with JAMES x2 to RCA is a direct admit from outside hospital with complaint of chest pain. Troponin is mildly elevated and stable. EKG is nonspecific and unchanged from prior. Cardiology evaluation complete and patient is cleared to go home from cardiology standpoint. Subjective   General  Chart Reviewed: Yes  Response To Previous Treatment: Patient with no complaints from previous session. Family / Caregiver Present: No  Referring Practitioner: Alli Lester MD  Subjective  Subjective: frustrated about BP readings this morning but also reporting that he isn't feeling great, things are a bit hazy to him at the moment, doesn't feel comfortable leaving the chair and walking  General Comment  Comments: sitting in chair at arrival;  BP at rest 72/33  Pain Screening  Patient Currently in Pain: Denies  Vital Signs  Patient Currently in Pain: Denies     Objective   Bed mobility  Comment: not observed  Transfers  Sit to Stand: Contact guard assistance(x 3 reps during session to assess BP)  Stand to sit: Contact guard assistance  Comment: BP standing at chair 56/36, 63/41, and 67/44. Reporting hazy feeling when standing. Lessened once sitting again. Declined returning to bed. Nsg notified. Ambulation  Ambulation?: No  Stairs/Curb  Stairs?: No     Balance  Posture: Fair  Sitting - Static: Good  Sitting - Dynamic: Good  Standing - Static: Fair  Standing - Dynamic: Fair            Comment: 1st session: see details above. Limited by lower BP and symptomatic. Seated therex x 20 reps B--glut sets, HR, marching, LAQ.  BP following 62/41. Continued with additional exercises including red tband abd x 20, red tband HS curls x 20 B; small wt red ball BUE raise to shoulder height at best x 10, B elbow flex/ext x 10, dynamic reach 4x5 reps. Following additional therex 70/30 BP. Smaller red therapy ball toss seated 2x10. Pt left in chair, alarm on and needs in reach. 2nd session: pt sitting in chair at arrival, reports feeling better. Seated /84. STS CGA. Ambulated 150 ft and then 270 ft with RW and CGA (BP 95/54 and 105/68 respectively). Stairs: 12 (6inch) steps with B hand rails CGA with BP following 93/61. Standing alt toe taps 6inch step 2x1 minute with BUE support. BP following 99/62. Returned to chair in room. Alarm on and needs in reach. BP at end of session 112/73. Goals  Short term goals  Time Frame for Short term goals: 7-14 days  Short term goal 1: Patient will perform bed mobility independently. Short term goal 2: Patient will perform bed-chair transfer MOD I w/ LRAD. Short term goal 3: Patient will ambulate 150' MOD I w/ LRAD. Short term goal 4: Patient will negotiate up/down 12 stairs w/ single railing MOD I to access 2nd floor kitchen.   Patient Goals   Patient goals : to get home    Plan    Plan  Times per week: 75 minutes/day, 5 days/week  Times per day: Daily  Current Treatment Recommendations: Strengthening, Balance Training, Functional Mobility Training, Transfer Training, Gait Training, Stair training, Home Exercise Program, Safety Education & Training, Endurance Training, Patient/Caregiver Education & Training, Equipment Evaluation, Education, & procurement, Positioning  Safety Devices  Type of devices: Gait belt, Telesitter in use, Nurse notified, All fall risk precautions in place, Patient at risk for falls, Call light within reach, Chair alarm in place, Left in chair  Restraints  Initially in place: No     Therapy Time   Individual Concurrent Group Co-treatment   Time In 0900 Time Out 0945         Minutes 45         Timed Code Treatment Minutes: 39 Minutes     Second Session Therapy Time:   Individual Concurrent Group Co-treatment   Time In 1130         Time Out 1200         Minutes 30           Timed Code Treatment Minutes:  75    Total Treatment Minutes:  [address], [address]

## 2021-01-26 NOTE — PROGRESS NOTES
ground up     -Pt also observed with medications whole in puree. No over clinical s/s of aspiration or penetration.    -Pt ate 100% of lunch meal (tomato soup, minced and moist pepper steak, pudding). Pt reported no problems with swallowing during lunch. Recorded calorie count     - Attempted use of NMES Vital Stim placement 3a, however, pt reporting \"pins/ needles/ burning sensation\" discontinued. Suspect due to pt shaving prior to session causing increased sensitivity. Will re-attempt tomorrow    Thin (carbonated) liquid via cup  -Pt observed to have immediate coughs 2/2 opp during session     Thin liquid via tsp (5 ml)   -Pt took two spoon fulls of thin liquid (water). Pt averaged 5 swallows to clear bolus/residue. No overt clinical s/s of penetration/ aspiration. Pudding via (10 ml soup spoon)   -Pt took two bites of pudding via soup spoon. Pt averaged 4 swallows per bite. Pt required min verbal cues to clear throat and re-swallow when he experienced wet vocal quality following initial trial.    Pt will complete a variety of swallow maneuvers in order to improve swallow function. Oropharyngeal strengthening   - Hard swallow x 10  - CTAR x 5  - Pt with improved ability to initiate  and sustain exercises. Effortful Swallow  -Pt was able to complete 10 dry  effortful swallows consecutively. Oropharyngeal Strengthening  (Pudding through Straw x 1)    -Time: 3:00 minutes   -Pt needed a break x 3 to swallow saliva and inhale/exhale.  -Pt able to expel pudding through straw in 4 attempts with breaks      Pt will recall and utilize safe swallow strategies in order to improve safety during po intake.    -Pt recalled using cough plus hard swallow to remove residue in the pharynx and to take small sips of thin liquids.    -Pt recalled using a deep swallow to clear residue.      Pt will improve vocal volume and use LOUD speech across graded tasks with 80% accuracy and carry over to the conversational environment AND MOIST; No Caffeine, No Drinking Straw  Dietary Nutrition Supplements: Other Oral Supplement (see comment)  Dietary Nutrition Supplements: Standard High Calorie Oral Supplement   Recommended Form of Meds: Crushed in puree as able  Compensatory Swallowing Strategies: Alternate solids and liquids, Eat/Feed slowly, No straws, Upright as possible for all oral intake, Remain upright for 30-45 minutes after meals, Small bites/sips     Plan:     Frequency:  5days/week   30-60  Minutes/day; attempting 60 minutes of treatment, will addend POC pending pt's tolerance     Discharge Recommendations:   Barriers: long term dysphagia; nutritional compromise; Discharge Recommendations:  [] Home independently  [x] Home with assistance []  24 hour supervision  [] SNF [] Other:  Continued SLP Treatment:  [x] Yes [] No [] TBD based on progress while on ARU [] Vital Stim indicated [] Other:   Estimated discharge date: 2/2/21    Type of Total Treatment Minutes   Session 1   Session 2   Time In 0810 1245   Time Out 0840 1315   Timed Code Minutes  10 0   Individual Treatment Minutes  30 30   Co-Treatment Minutes      Group Treatment Minutes      Concurrent Treatment Minutes        TOTAL DAILY MINUTES: 60    Electronically Signed by    Session 1  Tamra Hoyt M.A. CCC-SLP S.P. [id number]  Speech-Language Pathologist [phone]  1/26/2021 9:23 AM     The speech-language pathologist was present, directed the patient's care, made skilled judgment and was responsible for assessment and treatment. KHADAR Calvert,  Speech-Language Pathology      Session [facility] M. A. CCC-SLP #[id number] 1/26/2021 2:03 PM  Speech-Language Pathologist    The speech-language pathologist was present, directed the patient's care, made skilled judgment and was responsible for assessment and treatment.     KHADAR Calvert,  Speech-Language Pathology

## 2021-01-27 LAB
ANION GAP SERPL CALCULATED.3IONS-SCNC: 7 MMOL/L (ref 3–16)
BUN BLDV-MCNC: 25 MG/DL (ref 7–20)
CALCIUM SERPL-MCNC: 8.9 MG/DL (ref 8.3–10.6)
CHLORIDE BLD-SCNC: 103 MMOL/L (ref 99–110)
CO2: 30 MMOL/L (ref 21–32)
CREAT SERPL-MCNC: 1 MG/DL (ref 0.8–1.3)
GFR AFRICAN AMERICAN: >60
GFR NON-AFRICAN AMERICAN: >60
GLUCOSE BLD-MCNC: 91 MG/DL (ref 70–99)
HCT VFR BLD CALC: 30.6 % (ref 40.5–52.5)
HEMOGLOBIN: 9.9 G/DL (ref 13.5–17.5)
MCH RBC QN AUTO: 28.9 PG (ref 26–34)
MCHC RBC AUTO-ENTMCNC: 32.4 G/DL (ref 31–36)
MCV RBC AUTO: 89.2 FL (ref 80–100)
PDW BLD-RTO: 15.4 % (ref 12.4–15.4)
PLATELET # BLD: 143 K/UL (ref 135–450)
PMV BLD AUTO: 8.3 FL (ref 5–10.5)
POTASSIUM SERPL-SCNC: 3.5 MMOL/L (ref 3.5–5.1)
RBC # BLD: 3.43 M/UL (ref 4.2–5.9)
SODIUM BLD-SCNC: 140 MMOL/L (ref 136–145)
WBC # BLD: 4.5 K/UL (ref 4–11)

## 2021-01-27 PROCEDURE — 97530 THERAPEUTIC ACTIVITIES: CPT

## 2021-01-27 PROCEDURE — 2580000003 HC RX 258: Performed by: PHYSICAL MEDICINE & REHABILITATION

## 2021-01-27 PROCEDURE — 6360000002 HC RX W HCPCS: Performed by: PHYSICAL MEDICINE & REHABILITATION

## 2021-01-27 PROCEDURE — 97130 THER IVNTJ EA ADDL 15 MIN: CPT

## 2021-01-27 PROCEDURE — 36415 COLL VENOUS BLD VENIPUNCTURE: CPT

## 2021-01-27 PROCEDURE — 97535 SELF CARE MNGMENT TRAINING: CPT

## 2021-01-27 PROCEDURE — 80048 BASIC METABOLIC PNL TOTAL CA: CPT

## 2021-01-27 PROCEDURE — 92526 ORAL FUNCTION THERAPY: CPT

## 2021-01-27 PROCEDURE — 1280000000 HC REHAB R&B

## 2021-01-27 PROCEDURE — 97116 GAIT TRAINING THERAPY: CPT

## 2021-01-27 PROCEDURE — 97110 THERAPEUTIC EXERCISES: CPT

## 2021-01-27 PROCEDURE — 6370000000 HC RX 637 (ALT 250 FOR IP): Performed by: PHYSICAL MEDICINE & REHABILITATION

## 2021-01-27 PROCEDURE — 85027 COMPLETE CBC AUTOMATED: CPT

## 2021-01-27 PROCEDURE — 97129 THER IVNTJ 1ST 15 MIN: CPT

## 2021-01-27 RX ORDER — SODIUM CHLORIDE 9 MG/ML
INJECTION, SOLUTION INTRAVENOUS ONCE
Status: COMPLETED | OUTPATIENT
Start: 2021-01-27 | End: 2021-01-27

## 2021-01-27 RX ADMIN — Medication 10 ML: at 20:34

## 2021-01-27 RX ADMIN — FAMOTIDINE 20 MG: 20 TABLET, FILM COATED ORAL at 20:26

## 2021-01-27 RX ADMIN — MIDODRINE HYDROCHLORIDE 10 MG: 5 TABLET ORAL at 10:13

## 2021-01-27 RX ADMIN — CARBIDOPA AND LEVODOPA 1 TABLET: 10; 100 TABLET ORAL at 20:26

## 2021-01-27 RX ADMIN — SODIUM CHLORIDE: 9 INJECTION, SOLUTION INTRAVENOUS at 10:23

## 2021-01-27 RX ADMIN — ATORVASTATIN CALCIUM 20 MG: 20 TABLET, FILM COATED ORAL at 20:26

## 2021-01-27 RX ADMIN — ENOXAPARIN SODIUM 40 MG: 40 INJECTION SUBCUTANEOUS at 08:58

## 2021-01-27 RX ADMIN — MIDODRINE HYDROCHLORIDE 10 MG: 5 TABLET ORAL at 15:20

## 2021-01-27 RX ADMIN — CARBIDOPA AND LEVODOPA 1 TABLET: 10; 100 TABLET ORAL at 15:20

## 2021-01-27 RX ADMIN — FLUDROCORTISONE ACETATE 0.2 MG: 0.1 TABLET ORAL at 09:00

## 2021-01-27 RX ADMIN — CARVEDILOL 3.12 MG: 3.12 TABLET, FILM COATED ORAL at 08:58

## 2021-01-27 RX ADMIN — CLOPIDOGREL 75 MG: 75 TABLET, FILM COATED ORAL at 08:58

## 2021-01-27 RX ADMIN — ASPIRIN 81 MG: 81 TABLET, CHEWABLE ORAL at 08:58

## 2021-01-27 RX ADMIN — MIDODRINE HYDROCHLORIDE 10 MG: 5 TABLET ORAL at 06:34

## 2021-01-27 RX ADMIN — CARBIDOPA AND LEVODOPA 1 TABLET: 25; 100 TABLET, ORALLY DISINTEGRATING ORAL at 08:58

## 2021-01-27 RX ADMIN — FAMOTIDINE 20 MG: 20 TABLET, FILM COATED ORAL at 08:58

## 2021-01-27 RX ADMIN — TRAZODONE HYDROCHLORIDE 50 MG: 50 TABLET ORAL at 20:43

## 2021-01-27 RX ADMIN — CARVEDILOL 3.12 MG: 3.12 TABLET, FILM COATED ORAL at 15:47

## 2021-01-27 ASSESSMENT — PAIN SCALES - GENERAL
PAINLEVEL_OUTOF10: 0
PAINLEVEL_OUTOF10: 0

## 2021-01-27 NOTE — PROGRESS NOTES
Physical Therapy  Facility/Department: [facility] ACUTE REHAB UNIT  Daily Treatment Note  NAME: Queta Galvan  : 1940  MRN: [id number]    Date of Service: 2021    Discharge Recommendations:  24 hour supervision or assist, S Level 3, Home with Home health PT   PT Equipment Recommendations  Equipment Needed: No  Other: pt owns RW    Assessment   Body structures, Functions, Activity limitations: Decreased functional mobility ; Decreased ROM; Decreased safe awareness;Decreased endurance;Decreased balance;Decreased posture;Decreased ADL status; Decreased strength  Assessment: Orthostatic BP continues with varied symptoms affecting mobility. Pt more impulsive with transfers and ambulation today possibly due to mild symptoms from low BP. Continued skilled PT to promote return to PLOF. Treatment Diagnosis: impaired functional mobility  Prognosis: Good;Guarded(dependent on BP)  PT Education: PT Role;Goals;Plan of Care;General Safety;Transfer Training;Energy Conservation;Gait Training;Functional Mobility Training  Patient Education: Pt verbalizes understanding, but demonstrates slight cognitive delay so could benefit from reinforcement  Barriers to Learning: potential cognitive deficit  REQUIRES PT FOLLOW UP: Yes  Activity Tolerance  Activity Tolerance: Patient Tolerated treatment well  Activity Tolerance: sessions can be limited by low BP intermittently still     Patient Diagnosis(es): There were no encounter diagnoses. has a past medical history of CAD (coronary artery disease), COPD (chronic obstructive pulmonary disease) ([facility] [address] Coronary artery bypass, Coronary stent, Hyperlipidemia, Hypertension, Osteoarthritis, Parkinson disease ([address] Prostate cancer ([facility] [address] and Rotator cuff syndrome. has a past surgical history that includes Prostatectomy (); Colonoscopy (); Coronary artery bypass graft; Cardiac surgery (); Coronary angioplasty (); Coronary angioplasty ();  Cataract removal with implant (2015); sigmoidoscopy (N/A, 2019); and Colonoscopy (N/A, 2019). Restrictions  Restrictions/Precautions  Restrictions/Precautions: Fall Risk, Modified Diet  Required Braces or Orthoses?: No  Position Activity Restriction  Other position/activity restrictions: 78-year-old gentleman with history of CAD status post PCI 2021 for NSTEMI with JAMES x2 to RCA is a direct admit from outside hospital with complaint of chest pain. Troponin is mildly elevated and stable. EKG is nonspecific and unchanged from prior. Cardiology evaluation complete and patient is cleared to go home from cardiology standpoint. Subjective   General  Chart Reviewed: Yes  Response To Previous Treatment: Patient with no complaints from previous session.   Family / Caregiver Present: No  Referring Practitioner: Jeffrey Holman MD  Subjective  Subjective: agreeable to therapy session,  continues to be frustrated with BP  General Comment  Comments: sitting in chair at arrival, vitals detailed above, PT applied abdominal binder (OT had applied B ACE wraps to LEs and they were still present)  Pain Screening  Patient Currently in Pain: Denies  Vital Signs  Orthostatic B/P and Pulse?: Yes  Blood Pressure Sittin/57  Blood Pressure Standin/46  Patient Currently in Pain: Denies  Orthostatic B/P and Pulse?: Yes       Orientation     Cognition      Objective   Bed mobility  Comment: not observed  Transfers  Sit to Stand: Contact guard assistance(slighlty more impulsive)  Stand to sit: Contact guard assistance  Comment: CGA for mobility due to varied BP and symptoms  Ambulation  Ambulation?: Yes  Ambulation 1  Surface: level tile  Device: Rolling Walker  Assistance: Minimal assistance;Contact guard assistance  Quality of Gait: Slightly more impulsive with a quicker ja and not maintaining body position within RW, flexed trunk  Gait Deviations: Shuffles;Staggers  Distance: 270 ft  Comments: BP following ambulation 66/40 (minimal symptoms Endurance Training, Patient/Caregiver Education & Training, Equipment Evaluation, Education, & procurement, Positioning  Safety Devices  Type of devices: Gait belt, Telesitter in use, Nurse notified, All fall risk precautions in place, Patient at risk for falls, Call light within reach, Chair alarm in place, Left in chair  Restraints  Initially in place: No     Therapy Time   Individual Concurrent Group Co-treatment   Time In 1245         Time Out 1330         Minutes 45         Timed Code Treatment Minutes: [facility] Time:   Individual Concurrent Group Co-treatment   Time In 1435         Time Out 1505         Minutes 30           Timed Code Treatment Minutes:  75    Total Treatment Minutes:  Fernie Live, [id number]

## 2021-01-27 NOTE — CARE COORDINATION
SW spoke with patient's spouse. Spouse requesting an update on patient's discharge plan. Nelly Camilo Spouse stating that she is speaking with [employer] regarding patient's daughter becoming patient's paid caregiver. MABLE/KARMA will continue to follow progress and update patient's discharge plan as needed.     Electronically signed by MARYAN Streeter on 1/27/2021 at 2:28 PM

## 2021-01-27 NOTE — PLAN OF CARE
Problem: Falls - Risk of:  Goal: Will remain free from falls  Description: Will remain free from falls  1/26/2021 2242 by Dakota RN  Outcome: Ongoing     Problem: Skin Integrity:  Goal: Will show no infection signs and symptoms  Description: Will show no infection signs and symptoms  1/26/2021 2242 by Dakota RN  Outcome: Ongoing

## 2021-01-27 NOTE — PROGRESS NOTES
ACUTE REHAB UNIT  SPEECH/LANGUAGE PATHOLOGY      [x] Daily  [] Weekly Care Conference Note  [] Discharge    Deniz Spears     ZHP:9/[birth year]  EDX:[id number]  Room #: KKI-1799/5117-48   Rehab Dx/Hx: Franny Rasheed [R53.81]  Date of Admit: 1/15/2021      Precautions: falls and aspirations  Home situation: Pt lives at home with several family members. Pt required assistance prior to admission   ST Dx: Oropharyngeal dysphagia; Dysarthria; Cognitive linguistic deficits     Daily Treatment Info:   Date: 1/27/2021   Tx session 1 Tx session 2   Pain Not feeling well, dizzy. Notified MD. Returned to bed at the end of the session. Reported feeling better, no pain    Subjective     Pt up in chair. Reported not feeling well (I.e. dizzy, blurry vision, in a fog/twilight). Hypotensive. MD aware. Pt up in chair for session. Improved BP documented (122/76)    Objective:  Goals     Pt will tolerate recommended diet level with no overt s/s aspiration   - Pt seen with breakfast tray consisting of chopped pancake with syrup, ground sausage, boost pudding, and lemonade. Pt ate 10% of ground sasuage, 25% of chopped pancake, and 10% of boost pudding. Soft/Ground Solids  -Pt had 2 bites of ground sausage with no overt clinical s/s of aspiration. Pt averaged 3 swallows per bite.  -Pt had 3 bites of chopped pancake with syrup. Pt had immediate weak cough x 1. Pt was able to cough and re-swallow to clear suspected residue. Averaged 4 swallows per bite of the soft solid  - plan for assessment of upgraded soft solid consistency during next treatment session      Thin liquids via cup  -Pt took one sip of thin liquid (lemonade) via cup. Pt showed no s/s of aspiration or penetration. Averaged 3 hard swallows per sip. Thin liquids via tsp  -Pt had delayed cough and wet vocal quality on 4/4 sips of thin liquid (lemonade) via tsp. Pt able to cough and utilize hard swallow to clear suspected residue. Pt averaged 3 hard swallows per tsp. -Pt reported his favorite breakfast foods are hash browns and waffles. Patient tolerated NMES via VitalStim placement 3a (targeting hyolaryngeal excursion) @ 12.5 mA for 28 minutes  minutes. Utilized in combination with upgraded solid trial, meal, and hard swallows. Trial of dry solid (sandwich)   - Pt required sequential swallows, an average of 3-4 per presentation   - Increased signs of pharyngeal pooling as trial progressed, suspect relation to lack of endurance   - required cues for cough and re swallow to clear wet vocal quality     Thin liquid via tsp (5mL)   - pt required an average of 4-5 swallows per presentation   - overt cough and wet vocal quality on 6/8 presentations     Discussed plan for additional trial of soft/bite sized solid prior to diet upgrade to ensure tolerance. Pt will complete a variety of swallow maneuvers in order to improve swallow function.   -Pt utilized cough plus hard swallow to clear suspected pharyngeal residue. Mod cues for hard swallow     Mod cues for cough and re swallow        Pt will recall and utilize safe swallow strategies in order to improve safety during po intake.    -Pt recalled using hard swallow to clear bolus/residue in the pharynx. - required set up assistance for use of tsp presentations during breakfast    Pt required increased cues - mod, for use of tsp presentations with intake of solid. Encouraged use of blended textures to aid in a-p propulsion and to clear pharyngeal residue. Pt verbalized understanding. Pt will improve vocal volume and use LOUD speech across graded tasks with 80% accuracy and carry over to the conversational environment with >min cues. Goal not targeted this session. Goal not targeted this session. Patient will complete convergent and divergent naming tasks for improved thought organization with > 80% accuracy. Goal not targeted this session.     Abstract divergent naming   - 87% (13/15 )  - Given repetition, accuracy improved to 100% (15/15 )   Pt will complete short term memory tasks and recall fx information for improved recall and carryover of information from day to day. -Pt grossly recalled BP reading this morning and dinner last night (I.e. \"some type of steak\"). -Pt recalled feeling dizzy and having blurred vision 3-4 times a day when at home. Pt reports that it has been happening more over the past two years. He stated that he checks his BP everyday and if low/feeling dizzy, he will sit in recliner or lay in bed for 20-30 minutes. Goal not targeted this session. Other areas targeted:     Education:   Provided pt with ongoing rationale re speech tasks and POC. Pt v/u. Provided ongoing education re rationale for treatment tasks and plan of care. Pt verbalized understanding       Safety Devices: [x] Call light within reach  [] Chair alarm activated  [x] Bed alarm activated  [] Other: [x] Call light within reach  [x] Chair alarm activated  [] Bed alarm activated  [] Other:      Assessment:   Speech Therapy Diagnosis  Cognitive Diagnosis: Pt presents with mild-moderate cognitive deficits in the domains of orientation, thought organization, short term memory, and working memory. Speech Diagnosis: Pt presents with moderate dysarthria charatcerized by reduced breath support, vocal intensity, and articulatory precision. Current Diet Order: DIET DYSPHAGIA MINCED AND MOIST; No Caffeine, No Drinking Straw  Dietary Nutrition Supplements: Other Oral Supplement (see comment)  Dietary Nutrition Supplements: Standard High Calorie Oral Supplement   Recommended Form of Meds: Crushed in puree as able  Compensatory Swallowing Strategies:  Alternate solids and liquids, Eat/Feed slowly, No straws, Upright as possible for all oral intake, Remain upright for 30-45 minutes after meals, Small bites/sips     Plan:     Frequency:  5days/week   30-60  Minutes/day; attempting 60 minutes of treatment, will addend POC pending pt's tolerance     Discharge Recommendations:   Barriers: long term dysphagia; nutritional compromise; Discharge Recommendations:  [] Home independently  [x] Home with assistance []  24 hour supervision  [] SNF [] Other:  Continued SLP Treatment:  [x] Yes [] No [] TBD based on progress while on ARU [] Vital Stim indicated [] Other:   Estimated discharge date: 2/2/21    Type of Total Treatment Minutes   Session 1   Session 2   Time In 0800 1155   Time Out 0830 1230   Timed Code Minutes  15 10   Individual Treatment Minutes  30 35   Co-Treatment Minutes      Group Treatment Minutes      Concurrent Treatment Minutes        TOTAL DAILY MINUTES: 65    Electronically Signed by    Chana Lamas M.A. CCC-SLP S.P. [id number]  Speech-Language Pathologist [phone]  1/27/2021 8:55 AM     The speech-language pathologist was present, directed the patient's care, made skilled judgment and was responsible for assessment and treatment.     AALIYAH Valencia.,  Speech-Language Pathology

## 2021-01-27 NOTE — PROGRESS NOTES
[facility]  1/27/2021  [id number]    Chief Complaint: Debility    Subjective:   No overnight events. No current complaints. Orthostatsis again this am and symptomatic. Doesn't feel he has made much progress occurrence of orthostasis events. ROS: No CP, SOB, dyspnea    Objective:  Patient Vitals for the past 24 hrs:   BP Temp Temp src Pulse Resp SpO2 Weight   01/27/21 0857 (!) 158/90 97.5 °F (36.4 °C) Oral 64 16 98 % --   01/27/21 0630 129/76 98.1 °F (36.7 °C) Oral 71 16 96 % --   01/27/21 0512 -- -- -- -- -- -- 120 lb 14.4 oz (54.8 kg)   01/26/21 1938 (!) 146/84 97.3 °F (36.3 °C) Oral 64 16 97 % --   01/26/21 1629 (!) 149/88 -- -- 68 -- -- --   01/26/21 1351 137/80 -- -- 67 -- -- --     Gen: No distress, pleasant. Resting in bedside chair. Thin  HEENT: Normocephalic, atraumatic. CV: Regular rate and rhythm. No MRG   Resp: No respiratory distress. CTAB   Abd: Soft, nontender nondistended  Ext: No edema. Neuro: Alert, oriented, appropriately interactive. Higher level cognitive deficits. Laboratory data: Available via EMR. Therapy progress:  PT  Position Activity Restriction  Other position/activity restrictions: 55-year-old gentleman with history of CAD status post PCI 1/8/2021 for NSTEMI with JAMES x2 to RCA is a direct admit from outside hospital with complaint of chest pain. Troponin is mildly elevated and stable. EKG is nonspecific and unchanged from prior. Cardiology evaluation complete and patient is cleared to go home from cardiology standpoint.   Objective     Sit to Stand: Contact guard assistance(x 3 reps during session to assess BP)  Stand to sit: Contact guard assistance  Bed to Chair: Contact guard assistance(pt walked ~6' from EoB to chair)  Device: Rolling Walker  Other Apparatus: Wheelchair follow  Assistance: Contact guard assistance, Minimal assistance  Distance: 40' x 1 and 300'x 1 and 200' x 1  OT  PT Equipment Recommendations  Equipment Needed: No  Other: TBD based on therapy progress  Toilet - Technique: Ambulating  Equipment Used: Grab bars  Toilet Transfers Comments: Increased assistance needed to transfer off toliet with use of grab bars. Assessment        SLP  Current Diet : Dysphagia Minced and Moist (Dysphagia II)  Current Liquid Diet : Thin  Diet Solids Recommendation: Dysphagia Minced and Moist (Dysphagia II)  Liquid Consistency Recommendation: Thin    Body mass index is 17.85 kg/m². Assessment:  Patient Active Problem List   Diagnosis    COPD (chronic obstructive pulmonary disease)    Coronary artery disease    Hypertension    Hyperlipidemia    Fever    SOB (shortness of breath)    Vertigo    Exertional dyspnea    Paroxysmal atrial fibrillation (HCC)    Parkinson disease ([address]    Hypotension    Chest pain    [facility] [address]    NSTEMI (non-ST elevated myocardial infarction) ([facility] [address]    Debility       Plan:   Debility: Complicated by Parkinson. PT/OT     Dysphagia: dysphagia diet, SLP     CAD: s/p CABG and recent PCI. Coreg decreased to 3.125     Parkinson: Sinemet per home regimen - decrease     Orthostasis: midodrine increased to 10, Florinef 0.2. s/p IVF bolus. Allow permissive HTN. S/p IVF - repeat bolus x1     HLD: Lipitor 20     COPD: No current meds     Iron deficiency anemia: s/p supplementation     UTI: cipro completed    Hypokalemia: supplemented, resolved     Bowels: Per protocol  Bladder: Per protocol   Sleep: Trazodone provided prn  Pain: Tylenol, tramadol  DVT PPx: Lovenox    Samy Burroughs MD 1/27/2021, 9:32 AM    * This document was created using dictation software. While all precautions were taken to ensure accuracy, errors may have occurred. Please disregard any typographical errors.

## 2021-01-27 NOTE — PROGRESS NOTES
Occupational Therapy  Facility/Department: [facility] ACUTE REHAB UNIT  Daily Treatment Note  NAME: Valeriy Chung  : 1940  MRN: [id number]    Date of Service: 2021    Discharge Recommendations:  Home with Home health OT, 24 hour supervision or assist, S Level 3  OT Equipment Recommendations  Equipment Needed: Yes    Assessment   Performance deficits / Impairments: Decreased functional mobility ; Decreased endurance;Decreased ADL status; Decreased safe awareness;Decreased cognition;Decreased fine motor control;Decreased ROM; Decreased strength  Assessment: Pt is not at baseline level of function and will benefit from continued OT to address the above limitations. Therapy sessions continue to be limited by variations in BP. Treatment Diagnosis: Debility and decreased ADLs due to chest pain, Parkinsons recent NSTEMI. OT Education: OT Role;Plan of Care;ADL Adaptive Strategies;Transfer Training;Precautions  Patient Education: pt verbalized understanding but would benefit from reinforcement for increased carryover  Barriers to Learning: Cognition  REQUIRES OT FOLLOW UP: Yes  Activity Tolerance  Activity Tolerance: Patient Tolerated treatment well  Activity Tolerance: 149/76, HR 62 semi-fowlers in bed; 99/60, HR 67 seated at EOB, Attempted Standing, but pt unable to tolerate standing long enough for BP read. Additional reading in w/c after 5 mins 82/43, HR 74  Safety Devices  Safety Devices in place: Yes  Type of devices: Call light within reach; Patient at risk for falls; All fall risk precautions in place;Nurse notified; Left in chair;Chair alarm in place         Patient Diagnosis(es): Debility     has a past medical history of CAD (coronary artery disease), COPD (chronic obstructive pulmonary disease) ([facility] [address] Coronary artery bypass, Coronary stent, Hyperlipidemia, Hypertension, Osteoarthritis, Parkinson disease ([facility] [address] Prostate cancer ([facility] [address] and Rotator cuff syndrome.    has a past surgical history that includes Prostatectomy (2004); Colonoscopy (2005); Coronary artery bypass graft; Cardiac surgery (1998); Coronary angioplasty (1993); Coronary angioplasty (2013); Cataract removal with implant (2015); sigmoidoscopy (N/A, 1/25/2019); and Colonoscopy (N/A, 8/30/2019). Restrictions  Restrictions/Precautions  Restrictions/Precautions: Fall Risk, Modified Diet  Required Braces or Orthoses?: No  Position Activity Restriction  Other position/activity restrictions: 71-year-old gentleman with history of CAD status post PCI 1/8/2021 for NSTEMI with JAMES x2 to RCA is a direct admit from outside hospital with complaint of chest pain. Troponin is mildly elevated and stable. EKG is nonspecific and unchanged from prior. Cardiology evaluation complete and patient is cleared to go home from cardiology standpoint. Subjective   General  Chart Reviewed: Yes  Additional Pertinent Hx: CAD, CABG, COPD, HTN, prostate cancer, OA  Family / Caregiver Present: No  Diagnosis: Debility  Subjective  Subjective: Pt sleeping in bed upon OT entering room, pt agreeable to OT tx session. General Comment  Comments: RN tamiko for therapy  Vital Signs  Patient Currently in Pain: No   Objective    ADL  LE Dressing: Minimal assistance;Verbal cueing; Increased time to complete(to don pants)     Balance  Sitting Balance: Modified independent   Standing Balance: Contact guard assistance  Standing Balance  Time: ~60 seconds  Activity: SST  Functional Mobility  Functional - Mobility Device: Rolling Walker  Activity: Other  Assist Level: Contact guard assistance  Bed mobility  Supine to Sit: Supervision  Scooting: Minimal assistance  Transfers  Sit to stand: Contact guard assistance  Stand to sit: Contact guard assistance      Additional Activities Comment  Additional Activities: This writer ace wrapped BLE (thigh high) in order to support BP during functional activities      Comment: Supine HF=090/90, Igchhj=609/70, s/p PFK=897/72.  Pt asymptomatic throughout Plan   Plan  Times per week: 5-7x/wk, 75 min  Times per day: Daily  Current Treatment Recommendations: Strengthening, ROM, Functional Mobility Training, Endurance Training, Safety Education & Training, Self-Care / ADL, Equipment Evaluation, Education, & procurement, Patient/Caregiver Education & Training, Balance Training    Goals  Short term goals  Time Frame for Short term goals: 2 weeks  Short term goal 1: Supervision for bathing- not met, progressing  Short term goal 2: Mod I dressing- not met, progressing  Short term goal 3: Mod I toileting- Not met progressing  Short term goal 4: Mod I for functional transfers- Not met progressing  Short term goal 5: Mod I for functional mobility- Not met progressing  Short term goal 6: Mod I grooming- Not met progressing  Long term goals  Time Frame for Long term goals : STGs= LTGs  Patient Goals   Patient goals : Go home with wife       Therapy Time   Individual Concurrent Group Co-treatment   Time In [address]         Time Out 1155         Minutes [address], [address] OTR/L, [address]

## 2021-01-27 NOTE — PROGRESS NOTES
Occupational Therapy  Facility/Department: [facility] ACUTE REHAB UNIT  Daily Treatment Note  NAME: Lula Hanks  : 1940  MRN: [id number]    Date of Service: 2021    Discharge Recommendations:  Home with Home health OT, 24 hour supervision or assist, S Level 3  OT Equipment Recommendations  Walker: Rolling  ADL Assistive Devices: Shower Chair with back    Assessment   Performance deficits / Impairments: Decreased functional mobility ; Decreased endurance;Decreased ADL status; Decreased safe awareness;Decreased cognition;Decreased fine motor control;Decreased ROM; Decreased strength  Assessment: Pt is not at baseline level of function and will benefit from continued OT to address the above limitations. Therapy sessions continue to be limited by variations in BP. Treatment Diagnosis: Debility and decreased ADLs due to chest pain, Parkinsons recent NSTEMI. Activity Tolerance  Activity Tolerance: 149/76, HR 62 semi-fowlers in bed; 99/60, HR 67 seated at EOB, Attempted Standing, but pt unable to tolerate standing long enough for BP read. Additional reading in w/c after 5 mins 82/43, HR 74         Patient Diagnosis(es): There were no encounter diagnoses. has a past medical history of CAD (coronary artery disease), COPD (chronic obstructive pulmonary disease) ([address] Coronary artery bypass, Coronary stent, Hyperlipidemia, Hypertension, Osteoarthritis, Parkinson disease ([address] Prostate cancer ([address] and Rotator cuff syndrome. has a past surgical history that includes Prostatectomy (); Colonoscopy (); Coronary artery bypass graft; Cardiac surgery (); Coronary angioplasty (); Coronary angioplasty (); Cataract removal with implant (); sigmoidoscopy (N/A, 2019); and Colonoscopy (N/A, 2019).     Restrictions  Restrictions/Precautions  Restrictions/Precautions: Fall Risk, Modified Diet  Required Braces or Orthoses?: No  Position Activity Restriction  Other position/activity restrictions: 58-year-old gentleman with history of CAD status post PCI 1/8/2021 for NSTEMI with JAMES x2 to RCA is a direct admit from outside hospital with complaint of chest pain. Troponin is mildly elevated and stable. EKG is nonspecific and unchanged from prior. Cardiology evaluation complete and patient is cleared to go home from cardiology standpoint. Subjective   General  Chart Reviewed: Yes  Additional Pertinent Hx: CAD, CABG, COPD, HTN, prostate cancer, OA  Family / Caregiver Present: No  Diagnosis: Debility  Subjective  Subjective: Pt sleeping in bed upon OT entering room, pt agreeable to OT tx session. General Comment  Comments: RN okay for therapy  Vital Signs  Patient Currently in Pain: Denies   Orientation  Orientation  Overall Orientation Status: Within Functional Limits  Objective    ADL  Grooming: Modified independent (oral care, hand hygiene while seated in w/c at sink)  UE Dressing: Minimal assistance(Assistance with donning and doffing shirt over head)  Additional Comments: ADLs continue to be limited by varying levels of BP. Pt symptomatic at times. Balance  Sitting Balance: Modified independent (seated on w/c)  Standing Balance: Contact guard assistance  Standing Balance  Time: total of up to 1 min  Comment: standing tolerance limited due to symptoms of low BP  Functional Mobility  Functional - Mobility Device: Rolling Walker  Activity: To/from bathroom  Assist Level: Contact guard assistance  Functional Mobility Comments: seated  Bed mobility  Rolling to Left: Supervision  Rolling to Right: Supervision  Supine to Sit: Supervision  Scooting: Supervision  Transfers  Sit to stand: Contact guard assistance  Stand to sit: Contact guard assistance                       Cognition  Overall Cognitive Status: Exceptions  Arousal/Alertness: Delayed responses to stimuli  Following Commands: Follows one step commands with repetition; Follows one step commands with increased time  Attention Span: Attends with cues to redirect; Difficulty attending to directions  Memory: Decreased recall of recent events;Decreased short term memory;Decreased long term memory;Decreased recall of biographical Information;Decreased recall of precautions  Safety Judgement: Decreased awareness of need for safety;Decreased awareness of need for assistance  Insights: Decreased awareness of deficits  Initiation: Requires cues for some  Sequencing: Requires cues for some                                         Plan   Plan  Times per week: 5-7x/wk, 75 min  Times per day: Daily  Current Treatment Recommendations: Strengthening, ROM, Functional Mobility Training, Endurance Training, Safety Education & Training, Self-Care / ADL, Equipment Evaluation, Education, & procurement, Patient/Caregiver Education & Training, Balance Training    Goals  Short term goals  Time Frame for Short term goals: 2 weeks  Short term goal 1: Supervision for bathing- not met, progressing  Short term goal 2: Mod I dressing- not met, progressing  Short term goal 3: Mod I toileting- Not met progressing  Short term goal 4: Mod I for functional transfers- Not met progressing  Short term goal 5: Mod I for functional mobility- Not met progressing  Short term goal 6: Mod I grooming- GOAL MET 1/27 while seated in w/c  Long term goals  Time Frame for Long term goals : STGs= LTGs  Patient Goals   Patient goals : Go home with wife       Therapy Time:   Individual Concurrent Group Co-treatment   Time In 0915         Time Out 1000         Minutes 45           Timed Code Treatment Minutes:  [address], OTR/L

## 2021-01-28 PROCEDURE — 2580000003 HC RX 258: Performed by: PHYSICAL MEDICINE & REHABILITATION

## 2021-01-28 PROCEDURE — 6370000000 HC RX 637 (ALT 250 FOR IP): Performed by: PHYSICAL MEDICINE & REHABILITATION

## 2021-01-28 PROCEDURE — 92507 TX SP LANG VOICE COMM INDIV: CPT

## 2021-01-28 PROCEDURE — 92526 ORAL FUNCTION THERAPY: CPT

## 2021-01-28 PROCEDURE — 6360000002 HC RX W HCPCS: Performed by: PHYSICAL MEDICINE & REHABILITATION

## 2021-01-28 PROCEDURE — 1280000000 HC REHAB R&B

## 2021-01-28 PROCEDURE — 97530 THERAPEUTIC ACTIVITIES: CPT

## 2021-01-28 PROCEDURE — 97116 GAIT TRAINING THERAPY: CPT

## 2021-01-28 PROCEDURE — 97112 NEUROMUSCULAR REEDUCATION: CPT

## 2021-01-28 PROCEDURE — 97535 SELF CARE MNGMENT TRAINING: CPT

## 2021-01-28 PROCEDURE — 97129 THER IVNTJ 1ST 15 MIN: CPT

## 2021-01-28 RX ADMIN — MIDODRINE HYDROCHLORIDE 10 MG: 5 TABLET ORAL at 15:30

## 2021-01-28 RX ADMIN — FLUDROCORTISONE ACETATE 0.2 MG: 0.1 TABLET ORAL at 09:17

## 2021-01-28 RX ADMIN — CARBIDOPA AND LEVODOPA 1 TABLET: 10; 100 TABLET ORAL at 09:17

## 2021-01-28 RX ADMIN — CARVEDILOL 3.12 MG: 3.12 TABLET, FILM COATED ORAL at 17:37

## 2021-01-28 RX ADMIN — ATORVASTATIN CALCIUM 20 MG: 20 TABLET, FILM COATED ORAL at 23:23

## 2021-01-28 RX ADMIN — FAMOTIDINE 20 MG: 20 TABLET, FILM COATED ORAL at 09:16

## 2021-01-28 RX ADMIN — ENOXAPARIN SODIUM 40 MG: 40 INJECTION SUBCUTANEOUS at 09:19

## 2021-01-28 RX ADMIN — CARVEDILOL 3.12 MG: 3.12 TABLET, FILM COATED ORAL at 09:18

## 2021-01-28 RX ADMIN — CARBIDOPA AND LEVODOPA 1 TABLET: 10; 100 TABLET ORAL at 23:23

## 2021-01-28 RX ADMIN — FAMOTIDINE 20 MG: 20 TABLET, FILM COATED ORAL at 23:23

## 2021-01-28 RX ADMIN — MIDODRINE HYDROCHLORIDE 10 MG: 5 TABLET ORAL at 09:16

## 2021-01-28 RX ADMIN — ASPIRIN 81 MG: 81 TABLET, CHEWABLE ORAL at 09:17

## 2021-01-28 RX ADMIN — MIDODRINE HYDROCHLORIDE 10 MG: 5 TABLET ORAL at 05:25

## 2021-01-28 RX ADMIN — CLOPIDOGREL 75 MG: 75 TABLET, FILM COATED ORAL at 09:16

## 2021-01-28 RX ADMIN — Medication 10 ML: at 23:23

## 2021-01-28 RX ADMIN — CARBIDOPA AND LEVODOPA 1 TABLET: 10; 100 TABLET ORAL at 15:30

## 2021-01-28 ASSESSMENT — PAIN SCALES - GENERAL: PAINLEVEL_OUTOF10: 0

## 2021-01-28 NOTE — PROGRESS NOTES
has a past surgical history that includes Prostatectomy (2004); Colonoscopy (2005); Coronary artery bypass graft; Cardiac surgery (1998); Coronary angioplasty (1993); Coronary angioplasty (2013); Cataract removal with implant (2015); sigmoidoscopy (N/A, 1/25/2019); and Colonoscopy (N/A, 8/30/2019). Restrictions  Restrictions/Precautions  Restrictions/Precautions: Fall Risk, Modified Diet  Required Braces or Orthoses?: No  Position Activity Restriction  Sternal Precautions: BP 86/44 sitting, 99/63 after ACE wraps applied, 93/60 after grooming in stance. Other position/activity restrictions: 46-year-old gentleman with history of CAD status post PCI 1/8/2021 for NSTEMI with JAMES x2 to RCA is a direct admit from outside hospital with complaint of chest pain. Troponin is mildly elevated and stable. EKG is nonspecific and unchanged from prior. Cardiology evaluation complete and patient is cleared to go home from cardiology standpoint. Subjective   General  Chart Reviewed: Yes  Additional Pertinent Hx: CAD, CABG, COPD, HTN, prostate cancer, OA  Family / Caregiver Present: No  Diagnosis: Debility  Subjective  Subjective: Pt seated in chair at begining of session. Pleseant and agreeable. General Comment  Comments: RN tamiko for therapy  Pre Treatment Pain Screening  Intervention List: Patient able to continue with treatment  Vital Signs  Patient Currently in Pain: Denies   Orientation  Orientation  Overall Orientation Status: Within Functional Limits  Objective    ADL  Grooming: Contact guard assistance(in stance)  LE Dressing: Minimal assistance;Verbal cueing; Increased time to complete  Toileting: Contact guard assistance  Additional Comments: ACE bandages on LE placed by therapist.        Balance  Sitting Balance: Modified independent   Standing Balance: Contact guard assistance  Standing Balance  Time: ~60 seconds  Activity: sit to stand, toliet transfer, grooming  Toilet Transfers  Toilet - Technique: Ambulating Equipment Used: Grab bars  Toilet Transfer: Contact guard assistance  Bed mobility  Comment: not observed  Transfers  Sit to stand: Contact guard assistance  Stand to sit: Contact guard assistance                       Cognition  Overall Cognitive Status: Exceptions  Arousal/Alertness: Delayed responses to stimuli  Following Commands: Follows one step commands with repetition; Follows one step commands with increased time  Attention Span: Attends with cues to redirect; Difficulty attending to directions  Memory: Decreased recall of recent events;Decreased short term memory;Decreased long term memory;Decreased recall of biographical Information;Decreased recall of precautions  Safety Judgement: Decreased awareness of need for safety;Decreased awareness of need for assistance  Problem Solving: Assistance required to generate solutions;Assistance required to identify errors made;Assistance required to correct errors made  Insights: Decreased awareness of deficits  Initiation: Requires cues for some  Sequencing: Requires cues for some          Plan   Plan  Times per week: 5-7x/wk, 75 min  Times per day: Daily  Current Treatment Recommendations: Strengthening, ROM, Functional Mobility Training, Endurance Training, Safety Education & Training, Self-Care / ADL, Equipment Evaluation, Education, & procurement, Patient/Caregiver Education & Training, Balance Training    Goals  Short term goals  Time Frame for Short term goals: 2 weeks  Short term goal 1: Supervision for bathing- not met, progressing  Short term goal 2: Mod I dressing- not met, progressing  Short term goal 3: Mod I toileting- Not met progressing  Short term goal 4: Mod I for functional transfers- Not met progressing  Short term goal 5: Mod I for functional mobility- Not met progressing  Short term goal 6: Mod I grooming- GOAL MET 1/27 while seated in w/c  Long term goals  Time Frame for Long term goals : STGs= LTGs  Patient Goals Patient goals : Go home with wife       Therapy Time   Individual Concurrent Group Co-treatment   Time In 1030         Time Out 1100         Minutes 30              Timed Code Treatment Minutes:  30 Minutes    Total Treatment Minutes:  [facility], OT

## 2021-01-28 NOTE — PROGRESS NOTES
Occupational Therapy  Facility/Department: [facility] ACUTE REHAB UNIT  Daily Treatment Note  NAME: Kallie Ortiz  : 1940  MRN: [id number]    Date of Service: 2021    Discharge Recommendations:  Home with Home health OT, 24 hour supervision or assist, S Level 3  OT Equipment Recommendations  Equipment Needed: Yes  Walker: Rolling  ADL Assistive Devices: Shower Chair with back  Other: continue to assess    Assessment   Performance deficits / Impairments: Decreased functional mobility ; Decreased endurance;Decreased ADL status; Decreased safe awareness;Decreased cognition;Decreased fine motor control;Decreased ROM; Decreased strength  Assessment: Pt is not at baseline level of function and will benefit from continued OT to address the above limitations. Therapy sessions continue to be limited by variations in BP. Treatment Diagnosis: Debility and decreased ADLs due to chest pain, Parkinsons recent NSTEMI. Prognosis: Fair  OT Education: OT Role;Plan of Care;ADL Adaptive Strategies;Transfer Training;Precautions  Patient Education: pt verbalized understanding but would benefit from reinforcement for increased carryover  Barriers to Learning: Cognition  REQUIRES OT FOLLOW UP: Yes  Activity Tolerance  Activity Tolerance: Patient Tolerated treatment well  Safety Devices  Safety Devices in place: Yes  Type of devices: Call light within reach; Patient at risk for falls; All fall risk precautions in place;Nurse notified; Left in chair;Chair alarm in place         Patient Diagnosis(es): Debility      has a past medical history of CAD (coronary artery disease), COPD (chronic obstructive pulmonary disease) ([facility] [address] Coronary artery bypass, Coronary stent, Hyperlipidemia, Hypertension, Osteoarthritis, Parkinson disease ([facility] [address] Prostate cancer ([facility] [address] and Rotator cuff syndrome. has a past surgical history that includes Prostatectomy (); Colonoscopy (); Coronary artery bypass graft; Cardiac surgery ();  Coronary angioplasty (1993); Coronary angioplasty (2013); Cataract removal with implant (2015); sigmoidoscopy (N/A, 1/25/2019); and Colonoscopy (N/A, 8/30/2019). Restrictions  Restrictions/Precautions  Restrictions/Precautions: Fall Risk, Modified Diet  Required Braces or Orthoses?: No  Position Activity Restriction  Sternal Precautions: BP 86/44 sitting, 99/63 after ACE wraps applied, 93/60 after grooming in stance. Other position/activity restrictions: 67-year-old gentleman with history of CAD status post PCI 1/8/2021 for NSTEMI with JAMES x2 to RCA is a direct admit from outside hospital with complaint of chest pain. Troponin is mildly elevated and stable. EKG is nonspecific and unchanged from prior. Cardiology evaluation complete and patient is cleared to go home from cardiology standpoint. Subjective   General  Chart Reviewed: Yes  Additional Pertinent Hx: CAD, CABG, COPD, HTN, prostate cancer, OA  Family / Caregiver Present: No  Diagnosis: Debility  Subjective  Subjective: Pt seated in chair at begining of session. Pleseant and agreeable. General Comment  Comments: RN okay for therapy  Pre Treatment Pain Screening  Intervention List: Patient able to continue with treatment  Vital Signs  Patient Currently in Pain: Denies   Orientation  Orientation  Overall Orientation Status: Within Functional Limits  Objective    ADL  Grooming: Contact guard assistance(in stance)  LE Dressing: Minimal assistance;Verbal cueing; Increased time to complete  Toileting: Contact guard assistance  Additional Comments: ACE bandages on LE placed by therapist.        Balance  Sitting Balance: Modified independent   Standing Balance: Contact guard assistance  Standing Balance  Time: ~60 seconds  Activity: sit to stand, toliet transfer, grooming  Toilet Transfers  Toilet - Technique: Ambulating  Equipment Used: Grab bars  Toilet Transfer: Contact guard assistance  Bed mobility  Comment: not observed  Transfers  Sit to stand: Contact guard assistance  Stand to sit: Contact guard assistance                       Cognition  Overall Cognitive Status: Exceptions  Arousal/Alertness: Delayed responses to stimuli  Following Commands: Follows one step commands with repetition; Follows one step commands with increased time  Attention Span: Attends with cues to redirect; Difficulty attending to directions  Memory: Decreased recall of recent events;Decreased short term memory;Decreased long term memory;Decreased recall of biographical Information;Decreased recall of precautions  Safety Judgement: Decreased awareness of need for safety;Decreased awareness of need for assistance  Problem Solving: Assistance required to generate solutions;Assistance required to identify errors made;Assistance required to correct errors made  Insights: Decreased awareness of deficits  Initiation: Requires cues for some  Sequencing: Requires cues for some      PM session: Pt with Physical therapy in dinning room upon arrival. Denies pain and agreeable to OT session. Pt performed functional mobility in dinning room with CGA. Cues to keep RW closer to pt and cues to step back prior to sitting. Performed sit to stand from kitchen chair with CGA. Pt stood at raised table for 5 minutes while engaging in IADL leisure ax. Performed functional mobility to couch for transfer, transferred CGA. Cues for w/c transfer, pt attempting to sit on arm rest, cues for centering pt prior to sitting. Pt performed functional mobility with RW ~100 ft back to room. Attempting to leave rw once entering room. Cues to keep RW closer to pt. Pt seated on shower chair for grooming task. Completed mod I. Seated in recliner for reaching task. Stood for ~6 minutes, no LOB, no posterior lean. Pt wanting to return to bed. Transferred to bed with supervision. Left in bed with call light, phone( talking to family), bed alarm on, all needs met.      Plan   Plan  Times per week: 5-7x/wk, 75 min  Times per day: Daily  Current Treatment Recommendations: Strengthening, ROM, Functional Mobility Training, Endurance Training, Safety Education & Training, Self-Care / ADL, Equipment Evaluation, Education, & procurement, Patient/Caregiver Education & Training, Balance Training    Goals  Short term goals  Time Frame for Short term goals: 2 weeks  Short term goal 1: Supervision for bathing- not met, progressing  Short term goal 2: Mod I dressing- not met, progressing  Short term goal 3: Mod I toileting- Not met progressing  Short term goal 4: Mod I for functional transfers- Not met progressing  Short term goal 5: Mod I for functional mobility- Not met progressing  Short term goal 6: Mod I grooming- GOAL MET 1/27 while seated in w/c  Long term goals  Time Frame for Long term goals : STGs= LTGs  Patient Goals   Patient goals : Go home with wife       Therapy Time   Individual Concurrent Group Co-treatment   Time In 1030         Time Out 1100         Minutes 30              Timed Code Treatment Minutes:  30 Minutes    Total Treatment Minutes:  30       Second Session Therapy Time:   Individual Concurrent Group Co-treatment   Time In 1505         Time Out 1550         Minutes 45           Timed Code Treatment Minutes:  78+12    Total Treatment Minutes:  75 minutes    Nathan Schultz OTR/L [address] Hernandez, OT

## 2021-01-28 NOTE — PROGRESS NOTES
(1998); Coronary angioplasty (1993); Coronary angioplasty (2013); Cataract removal with implant (2015); sigmoidoscopy (N/A, 1/25/2019); and Colonoscopy (N/A, 8/30/2019). Restrictions  Restrictions/Precautions  Restrictions/Precautions: Fall Risk, Modified Diet  Required Braces or Orthoses?: No  Position Activity Restriction  Sternal Precautions: BP 86/44 sitting, 99/63 after ACE wraps applied, 93/60 after grooming in stance. Other position/activity restrictions: 71-year-old gentleman with history of CAD status post PCI 1/8/2021 for NSTEMI with JAMES x2 to RCA is a direct admit from outside hospital with complaint of chest pain. Troponin is mildly elevated and stable. EKG is nonspecific and unchanged from prior. Cardiology evaluation complete and patient is cleared to go home from cardiology standpoint. Subjective   General  Chart Reviewed: Yes  Response To Previous Treatment: Patient with no complaints from previous session. Family / Caregiver Present: No  Referring Practitioner: Garett Heath MD  Subjective  Subjective: Feeling good today. General Comment  Comments: sitting in chair at arrival, BP at rest seated 126/72. Slight tremors in hands observed today.   Pain Screening  Patient Currently in Pain: Denies  Vital Signs  Patient Currently in Pain: Denies     Objective   Bed mobility  Comment: not observed  Transfers  Sit to Stand: Contact guard assistance;Stand by assistance  Stand to sit: Contact guard assistance;Stand by assistance(safety cues when approaching surfaces, sitting prior to safe positioning)  Car Transfer: Contact guard assistance(pt stepped left leg into car prior to sitting, vc for safe approach in future with sitting first)  Ambulation  Ambulation?: Yes  Ambulation 1  Surface: level tile  Device: Rolling Walker  Other Apparatus: Wheelchair follow(B LEs ACE wrapped and abdominal binder present)  Assistance: Contact guard assistance  Quality of Gait: Less impulsive and staying within parameters of walker during first round,  bumped into 2 objects on right side due to environmental distractions during second round;  flexed posture  Gait Deviations: Shuffles;Staggers  Distance: 270 ft x 2  Comments: BP following ambulation 97/58 and 100/59 respectively; postural cues provided  Stairs/Curb  Stairs?: Yes  Stairs  # Steps : 12  Stairs Height: 6\"  Rails: Bilateral  Assistance: Contact guard assistance  Comment: reciprocal pattern, safety cues for entire foot placement on step     Balance  Posture: Fair  Sitting - Static: Good  Sitting - Dynamic: Good  Standing - Static: Fair;+  Standing - Dynamic: Fair  Exercises  Bridging: With kicks         Comment: 1st session: see functional mobility details above. Assisted to bathroom with RW x 15 ft and CGA. Pt aware of bowel accident on the way to commode. Toilet transfer with min A. PT assisted with donning new depends for time management. Pt performed pericare sitting. CGA in standing while managing LB clothing. BP following bathroom 98/59. PT re-wrapped Legs with ACE wraps to thigh level B. Applied abdominal binder prior to ambulation. Left in chair, alarm on and needs in reach. 2nd session: pt in chair at arrival. Assisted pt with calling wife. BP at rest 155/88. STS with SBA. Ambulated 130 ft with RW to destination and CGA. Aeromat activities: repeat STS x 5 reps to RW with CGA; using small yellow nubby ball held with B UEs pt performed chest press x 10, chest level elevation x 10, anterior plane circles cw/ccw x 10 each, trunk rot x 10 B, chop x 10 B (vc/tc for upright posture);  followed by dynamic reach within limitation of shoulders and beanbag toss 2x20. All with CGA to min A due to posterior lean. Pt left with OT at end of session for continued therapy. Goals  Short term goals  Time Frame for Short term goals: 7-14 days  Short term goal 1: Patient will perform bed mobility independently.   Short term goal 2: Patient will perform bed-chair transfer MOD I w/ LRAD. Short term goal 3: Patient will ambulate 150' MOD I w/ LRAD. Short term goal 4: Patient will negotiate up/down 12 stairs w/ single railing MOD I to access 2nd floor kitchen.   Patient Goals   Patient goals : to get home    Plan    Plan  Times per week: 75 minutes/day, 5 days/week  Times per day: Daily  Current Treatment Recommendations: Strengthening, Balance Training, Functional Mobility Training, Transfer Training, Gait Training, Stair training, Home Exercise Program, Safety Education & Training, Endurance Training, Patient/Caregiver Education & Training, Equipment Evaluation, Education, & procurement, Positioning  Safety Devices  Type of devices: Gait belt, Telesitter in use, Nurse notified, All fall risk precautions in place, Patient at risk for falls, Call light within reach, Chair alarm in place, Left in chair  Restraints  Initially in place: No     Therapy Time   Individual Concurrent Group Co-treatment   Time In 1245         Time Out 1330         Minutes 45         Timed Code Treatment Minutes: [address] [facility] Time:   Individual Concurrent Group Co-treatment   Time In 1435         Time Out 1505         Minutes 30           Timed Code Treatment Minutes:  75    Total Treatment Minutes:  [address], [address]

## 2021-01-28 NOTE — PLAN OF CARE
Nutrition Problem #1: Inadequate oral intake  Intervention: Food and/or Nutrient Delivery: Continue Current Diet, Continue Oral Nutrition Supplement  Nutritional Goals: PO greater than 50% at meals/supp

## 2021-01-28 NOTE — PROGRESS NOTES
George Ricardo  1/28/2021  [id number]    Chief Complaint: Debility    Subjective:   No overnight events. No current complaints. Slept well overnight. ROS: No CP, SOB, dyspnea    Objective:  Patient Vitals for the past 24 hrs:   BP Temp Temp src Pulse Resp SpO2   01/28/21 0915 136/85 97.7 °F (36.5 °C) Oral 74 16 100 %   01/28/21 0524 (!) 163/92 97.8 °F (36.6 °C) Oral 71 16 98 %   01/28/21 0207 (!) 164/87 -- -- 73 -- --   01/28/21 0200 (!) 182/100 98 °F (36.7 °C) Oral 73 15 97 %   01/27/21 2320 (!) 173/89 -- -- -- -- --   01/27/21 2150 (!) 84/55 -- -- -- -- --   01/27/21 2030 (!) 204/97 -- -- -- -- --   01/27/21 2015 (!) 204/108 98.2 °F (36.8 °C) Oral 69 16 96 %   01/27/21 1834 (!) 170/90 -- -- 64 -- --   01/27/21 1546 (!) 181/98 -- -- 67 -- --   01/27/21 1521 (!) 173/101 -- -- 67 -- --   01/27/21 1520 (!) 179/96 -- -- 67 -- --   01/27/21 1209 122/76 -- -- -- -- --   01/27/21 1015 (!) 79/43 -- -- 70 -- --     Gen: No distress, pleasant. Resting in bed. Thin  HEENT: Normocephalic, atraumatic. CV: Regular rate and rhythm. No MRG   Resp: No respiratory distress. CTAB   Abd: Soft, nontender nondistended  Ext: No edema. Neuro: Somnolent but easily arousable, oriented, appropriately interactive. Higher level cognitive deficits. Laboratory data: Available via EMR. Therapy progress:  PT  Position Activity Restriction  Other position/activity restrictions: 41-year-old gentleman with history of CAD status post PCI 1/8/2021 for NSTEMI with JAMES x2 to RCA is a direct admit from outside hospital with complaint of chest pain. Troponin is mildly elevated and stable. EKG is nonspecific and unchanged from prior. Cardiology evaluation complete and patient is cleared to go home from cardiology standpoint.   Objective     Sit to Stand: Contact guard assistance(slighlty more impulsive)  Stand to sit: Contact guard assistance  Bed to Chair: Contact guard assistance(pt walked ~6' from EoB to chair)  Device: Rolling Walker  Other Apparatus: Wheelchair follow  Assistance: Minimal assistance, Contact guard assistance  Distance: 270 ft  OT  PT Equipment Recommendations  Equipment Needed: No  Other: pt owns RW  Toilet - Technique: Ambulating  Equipment Used: Grab bars  Toilet Transfers Comments: Increased assistance needed to transfer off toliet with use of grab bars. Assessment        SLP  Current Diet : Dysphagia Minced and Moist (Dysphagia II)  Current Liquid Diet : Thin  Diet Solids Recommendation: Dysphagia Minced and Moist (Dysphagia II)  Liquid Consistency Recommendation: Thin    Body mass index is 17.85 kg/m². Assessment:  Patient Active Problem List   Diagnosis    COPD (chronic obstructive pulmonary disease)    Coronary artery disease    Hypertension    Hyperlipidemia    Fever    SOB (shortness of breath)    Vertigo    Exertional dyspnea    Paroxysmal atrial fibrillation (HCC)    Parkinson disease ([facility] [address]    Hypotension    Chest pain    Severe malnutrition ([facility] [address]    NSTEMI (non-ST elevated myocardial infarction) ([facility] [address]    Debility       Plan:   Debility: Complicated by Parkinson. PT/OT     Dysphagia: dysphagia diet, SLP     CAD: s/p CABG and recent PCI. Coreg decreased to 3.125     Parkinson: Sinemet - decreased     Orthostasis: midodrine increased to 10, Florinef 0.2. s/p IVF bolus. Allow permissive HTN. S/p IVF - sinemet decreased     HLD: Lipitor 20     COPD: No current meds     Iron deficiency anemia: s/p supplementation     UTI: cipro completed    Hypokalemia: supplemented, resolved     Bowels: Per protocol  Bladder: Per protocol   Sleep: Trazodone provided prn  Pain: Tylenol, tramadol  DVT PPx: Lovenox    Kaye Bowens MD 1/28/2021, 9:41 AM    * This document was created using dictation software. While all precautions were taken to ensure accuracy, errors may have occurred. Please disregard any typographical errors.

## 2021-01-28 NOTE — PROGRESS NOTES
PM assessment completed. Pt resting well in bed at this time. /108 LUE at rest. ACE wraps removed from BLEs. Pt asymptomatic at this time. Pt assisted to restroom and back to bed with walker. Complete linen changed d/t large urinary incontinence episode. Pt repositioned in bed. /97 after ambulation. Pt requesting sleeping medication. PRN trazodone administered. No further needs voiced. Will recheck BP in one hour.

## 2021-01-28 NOTE — PROGRESS NOTES
ACUTE REHAB UNIT  SPEECH/LANGUAGE PATHOLOGY      [x] Daily  [x] Weekly Care Conference Note  [] Discharge    Deniz Spears     DUJ:[birth year]  DKF:[id number]  Room #: OVE-6477/5527-65   Rehab Dx/Hx: Army Del Real [R53.81]  Date of Admit: 1/15/2021      Precautions: falls and aspirations  Home situation: Pt lives at home with several family members. Pt required assistance prior to admission   ST Dx: Oropharyngeal dysphagia; Dysarthria; Cognitive linguistic deficits     Daily Treatment Info:   Date: 1/28/2021   Tx session 1 Tx session 2   Pain Denied Denied   Subjective     Pt initially in bed upon entry to room. Pt tired with eyes closed. Called PCA to assist pt to chair. Pt was then able to awaken and remain more alert during session. Pt's BP at beginning of session was 161/68. Pt up in chair for session, alert, and willing to participate in speech therapy. Assessment:  Pt continues to present with moderate to severe oropharyngeal dysphagia. Pt is currently on a dysphagia II (minced and moist) diet with thin liquids. Despite severity of dysphagia issues, pt's respiratory status seems stable at this time. Pt continues to present with low vocal volume and articulatory precision but is able to increase volume during structured tasks. Fluctuating BP during all treatment sessions is impacting pt's ability to progress towards his goals. Due to severity of dysphagia, focus of therapy has been on swallowing. Objective:  Goals     Pt will tolerate recommended diet level with no overt s/s aspiration   - Pt seen with breakfast tray consisting of chopped sausage (ordered by SLP), eggs, pureed [place] toast, and boost pudding. Soft/Chopped Solids  -Out of 5 bites of chopped sausage, pt had immediate cough on 1. Pt cued to cough hard and re-swallow. Pt averaged 4-5 swallows per bite.   -Out of 4 bites of egg, pt had immediate cough on one. Pt also had anterior spillage of bolus during 1 bite.  Pt averaged 3-4 swallows per bite. Thin liquids via tsp  - Pt required an average of 4-5 swallows per presentation   - Overt cough and wet vocal quality on 4/6 presentations.   -Suspected aspiration/penetration due to slurping liquid from spoon causing premature bolus loss. - Pt seen finishing up breakfast meal. Pt reports no problems with swallow during meal. Pt ate 50% of eggs, 50% of pureed [place] toast, 1 1/2 sausage patties (trialed with SLP), and 90% of boost pudding.     -Pt was willing to finish eating chopped sausage patties from previous session. Chopped Sausage Patties  -Pt averaged 5 hard swallows per bite. Pt noted to have immediate weak cough on 3 out of 6 bites of chopped sausage. Pt become choked up on one bite causing anterior loss of bolus. Thin liquids via cup  -Pt took 4 sips of thin liquid (orange juice) from cup. Pt averaged 3 swallows per sip. -Delayed cough and wet vocal quality on 2/4 presentations.   -Pt cued to clear throat and re-swallow.     -Showed pt MBS results from 1/15 (soft solids-peaches). Provided pt with education re basic anatomy, premature bolus loss, and residual residue in pharynx after swallow. Pt v/u. Discussed importance of taking one bite at a time. Ongoing      Pt will complete a variety of swallow maneuvers in order to improve swallow function.   -Pt utilized hard swallow independently during breakfast meal.  -Pt utilized hard swallow independently to clear suspected pharyngeal residue. Ongoing      Pt will recall and utilize safe swallow strategies in order to improve safety during po intake.    -Pt recalled trying bread yesterday and 2 safe swallow strategies (I.e.\"slow and forceful\"). - Pt required min verbal cues for other strategies (I.e. small sips and bites)   - Pt required cueing to chew up food in mouth and swallow before taking another bite of chopped sausage.     Ongoing     Pt will improve vocal volume and use LOUD speech across graded tasks with 80% accuracy and carry over to the conversational environment with >min cues. Goal not targeted this session. Max Phonation Time x 5  -Average Time: 13.7 seconds  -Average dB: 75.6 dB    -Pt needed min-mod verbal cueing to add functional phrases to his list. Pt read the list of 5 phrases x 1, averaging 65-70 dB. Ongoing      Patient will complete convergent and divergent naming tasks for improved thought organization with > 80% accuracy. Goal not targeted this session. Goal not targeted this session. Ongoing    Pt will complete short term memory tasks and recall fx information for improved recall and carryover of information from day to day. Fx recall:  -pt recalled 2 swallow strategies independently   -recalled cueing for small bites/sips, one bite at a time  -pt did not recall working with SLP for MBS when on IP  -pt unable to recall what he ate for dinner last night -Pt recalled creating functional phrases during ST session earlier in the week. Ongoing    Other areas targeted:     Education:   Provided pt with ongoing rationale re speech tasks and POC. Pt v/u, requires ongoing learning. Provided ongoing education re rationale for treatment tasks (I.e. MBS), and POC. Pt v/u, requires ongoing learning. Safety Devices: [x] Call light within reach  [x] Chair alarm activated  [] Bed alarm activated  [x] Other: Camera in room [x] Call light within reach  [x] Chair alarm activated  [] Bed alarm activated  [x] Other: Camera in room     Assessment:   Speech Therapy Diagnosis  Cognitive Diagnosis: Pt presents with mild-moderate cognitive deficits in the domains of orientation, thought organization, short term memory, and working memory. Speech Diagnosis: Pt presents with moderate dysarthria charatcerized by reduced breath support, vocal intensity, and articulatory precision.     Current Diet Order: DIET DYSPHAGIA MINCED AND MOIST; No Caffeine, No Drinking Straw  Dietary Nutrition Supplements: Other Oral Supplement (see comment)  Dietary Nutrition Supplements: Standard High Calorie Oral Supplement   Recommended Form of Meds: Crushed in puree as able  Compensatory Swallowing Strategies: Alternate solids and liquids, Eat/Feed slowly, No straws, Upright as possible for all oral intake, Remain upright for 30-45 minutes after meals, Small bites/sips     Plan:     Frequency:  5days/week   30-60  Minutes/day; attempting 60 minutes of treatment, will addend POC pending pt's tolerance     Discharge Recommendations:   Barriers: long term dysphagia; nutritional compromise; Discharge Recommendations:  [] Home independently  [x] Home with assistance []  24 hour supervision  [] SNF [] Other:  Continued SLP Treatment:  [x] Yes [] No [] TBD based on progress while on ARU [] Vital Stim indicated [] Other:   Estimated discharge date: 2/2/21    Type of Total Treatment Minutes   Session 1   Session 2   Time In 0830 1000   Time Out 0900 1030   Timed Code Minutes  8 0   Individual Treatment Minutes  30 30   Co-Treatment Minutes      Group Treatment Minutes      Concurrent Treatment Minutes        TOTAL DAILY MINUTES: 60    Electronically Signed by    Julia Dennis M.S. [address] Pathologist  1/28/2021 12:33 PM     The speech-language pathologist was present, directed the patient's care, made skilled judgment and was responsible for assessment and treatment.     AALIYAH Greer.,  Speech-Language Pathology

## 2021-01-28 NOTE — PROGRESS NOTES
Nutrition Assessment     Type and Reason for Visit: Reassess    Nutrition Recommendations/Plan:   Diet consistency per SLP  Boost pudding & Ensure Enlive     Nutrition Assessment:  Pt appears to be stable with intake at meals, no improvement, yet no decline. Kcal ct discontinued. Pt reports is eating at least 50% of most meals. RN reports pt is eating 26-50% most meals. Pt willing to drink supplements, but dislikes strawberry. Will con't to encourage po & supp intake to help with healing & recovery. Malnutrition Assessment:  Malnutrition Status: Severe malnutrition(as previously assessed.)    Estimated Daily Nutrient Needs:  Energy (kcal): 9217-5994; Weight Used for Energy Requirements:  (con't to use 54.3 kg for est needs)     Protein (g): (1.5-2.0g/54.3kg);  Weight Used for Protein Requirements:  Current        Fluid (ml/day):  ; Weight Used for Fluid Requirements:  1 ml/kcal      Nutrition Related Findings: No edema noted; LBM 1/27      Current Nutrition Therapies:    DIET DYSPHAGIA MINCED AND MOIST; No Caffeine, No Drinking Straw  Dietary Nutrition Supplements: Other Oral Supplement (see comment)  Dietary Nutrition Supplements: Standard High Calorie Oral Supplement    Anthropometric Measures:  · Height: 5' 9\" (175.3 cm)  · Current Body Wt: 120 lb (54.4 kg)   · BMI: 17.7    Nutrition Diagnosis:   · Inadequate oral intake related to inadequate protein-energy intake as evidenced by intake 26-50%, BMI      Nutrition Interventions:   Food and/or Nutrient Delivery:  Continue Current Diet, Continue Oral Nutrition Supplement  Nutrition Education/Counseling:  Education not indicated   Coordination of Nutrition Care:  Continue to monitor while inpatient    Goals:  PO greater than 50% at meals/supp       Nutrition Monitoring and Evaluation:   Behavioral-Environmental Outcomes:  None Identified   Food/Nutrient Intake Outcomes:  Food and Nutrient Intake, Supplement Intake  Physical Signs/Symptoms Outcomes: Weight     Discharge Planning:    Continue Oral Nutrition Supplement     Electronically signed by Janine Malave RD, LD on 1/28/21 at 12:09 PM EST    Contact: [phone]

## 2021-01-28 NOTE — PLAN OF CARE
Problem: Falls - Risk of:  Goal: Will remain free from falls  Description: Will remain free from falls  Outcome: Ongoing  Note: Fall risk precautions in place, call light in reach, bed in lowest position, 2/2 bed rails up, bed wheels locked, bed side table within reach, bed alarm on, will continue to monitor. Problem: Skin Integrity:  Goal: Absence of new skin breakdown  Description: Absence of new skin breakdown  Outcome: Ongoing  Note: No new skin break down identified.

## 2021-01-29 LAB
ANION GAP SERPL CALCULATED.3IONS-SCNC: 8 MMOL/L (ref 3–16)
BUN BLDV-MCNC: 24 MG/DL (ref 7–20)
CALCIUM SERPL-MCNC: 8.8 MG/DL (ref 8.3–10.6)
CHLORIDE BLD-SCNC: 103 MMOL/L (ref 99–110)
CO2: 29 MMOL/L (ref 21–32)
CREAT SERPL-MCNC: 0.8 MG/DL (ref 0.8–1.3)
GFR AFRICAN AMERICAN: >60
GFR NON-AFRICAN AMERICAN: >60
GLUCOSE BLD-MCNC: 94 MG/DL (ref 70–99)
HCT VFR BLD CALC: 30.2 % (ref 40.5–52.5)
HEMOGLOBIN: 10 G/DL (ref 13.5–17.5)
MCH RBC QN AUTO: 29.3 PG (ref 26–34)
MCHC RBC AUTO-ENTMCNC: 33 G/DL (ref 31–36)
MCV RBC AUTO: 88.8 FL (ref 80–100)
PDW BLD-RTO: 15 % (ref 12.4–15.4)
PLATELET # BLD: 152 K/UL (ref 135–450)
PMV BLD AUTO: 8.2 FL (ref 5–10.5)
POTASSIUM SERPL-SCNC: 3.2 MMOL/L (ref 3.5–5.1)
RBC # BLD: 3.4 M/UL (ref 4.2–5.9)
SODIUM BLD-SCNC: 140 MMOL/L (ref 136–145)
WBC # BLD: 4.7 K/UL (ref 4–11)

## 2021-01-29 PROCEDURE — 1280000000 HC REHAB R&B

## 2021-01-29 PROCEDURE — 92507 TX SP LANG VOICE COMM INDIV: CPT

## 2021-01-29 PROCEDURE — 97535 SELF CARE MNGMENT TRAINING: CPT

## 2021-01-29 PROCEDURE — 97130 THER IVNTJ EA ADDL 15 MIN: CPT

## 2021-01-29 PROCEDURE — 6360000002 HC RX W HCPCS: Performed by: PHYSICAL MEDICINE & REHABILITATION

## 2021-01-29 PROCEDURE — 97129 THER IVNTJ 1ST 15 MIN: CPT

## 2021-01-29 PROCEDURE — 6370000000 HC RX 637 (ALT 250 FOR IP): Performed by: PHYSICAL MEDICINE & REHABILITATION

## 2021-01-29 PROCEDURE — 97110 THERAPEUTIC EXERCISES: CPT

## 2021-01-29 PROCEDURE — 97530 THERAPEUTIC ACTIVITIES: CPT

## 2021-01-29 PROCEDURE — 92526 ORAL FUNCTION THERAPY: CPT

## 2021-01-29 PROCEDURE — 80048 BASIC METABOLIC PNL TOTAL CA: CPT

## 2021-01-29 PROCEDURE — 36415 COLL VENOUS BLD VENIPUNCTURE: CPT

## 2021-01-29 PROCEDURE — 97116 GAIT TRAINING THERAPY: CPT

## 2021-01-29 PROCEDURE — 85027 COMPLETE CBC AUTOMATED: CPT

## 2021-01-29 PROCEDURE — 2580000003 HC RX 258: Performed by: PHYSICAL MEDICINE & REHABILITATION

## 2021-01-29 RX ORDER — POTASSIUM CHLORIDE 20 MEQ/1
20 TABLET, EXTENDED RELEASE ORAL
Status: DISCONTINUED | OUTPATIENT
Start: 2021-01-29 | End: 2021-02-02 | Stop reason: HOSPADM

## 2021-01-29 RX ADMIN — CARVEDILOL 3.12 MG: 3.12 TABLET, FILM COATED ORAL at 09:03

## 2021-01-29 RX ADMIN — ENOXAPARIN SODIUM 40 MG: 40 INJECTION SUBCUTANEOUS at 09:03

## 2021-01-29 RX ADMIN — FAMOTIDINE 20 MG: 20 TABLET, FILM COATED ORAL at 09:04

## 2021-01-29 RX ADMIN — CARBIDOPA AND LEVODOPA 1 TABLET: 10; 100 TABLET ORAL at 13:58

## 2021-01-29 RX ADMIN — CARVEDILOL 3.12 MG: 3.12 TABLET, FILM COATED ORAL at 16:51

## 2021-01-29 RX ADMIN — POTASSIUM CHLORIDE 20 MEQ: 1500 TABLET, EXTENDED RELEASE ORAL at 09:04

## 2021-01-29 RX ADMIN — FAMOTIDINE 20 MG: 20 TABLET, FILM COATED ORAL at 22:57

## 2021-01-29 RX ADMIN — CARBIDOPA AND LEVODOPA 1 TABLET: 10; 100 TABLET ORAL at 22:57

## 2021-01-29 RX ADMIN — Medication 10 ML: at 22:56

## 2021-01-29 RX ADMIN — ASPIRIN 81 MG: 81 TABLET, CHEWABLE ORAL at 09:04

## 2021-01-29 RX ADMIN — MIDODRINE HYDROCHLORIDE 10 MG: 5 TABLET ORAL at 06:08

## 2021-01-29 RX ADMIN — MIDODRINE HYDROCHLORIDE 10 MG: 5 TABLET ORAL at 16:51

## 2021-01-29 RX ADMIN — CARBIDOPA AND LEVODOPA 1 TABLET: 10; 100 TABLET ORAL at 09:04

## 2021-01-29 RX ADMIN — FLUDROCORTISONE ACETATE 0.2 MG: 0.1 TABLET ORAL at 09:05

## 2021-01-29 RX ADMIN — CLOPIDOGREL 75 MG: 75 TABLET, FILM COATED ORAL at 09:04

## 2021-01-29 RX ADMIN — MIDODRINE HYDROCHLORIDE 10 MG: 5 TABLET ORAL at 11:36

## 2021-01-29 RX ADMIN — ATORVASTATIN CALCIUM 20 MG: 20 TABLET, FILM COATED ORAL at 22:57

## 2021-01-29 ASSESSMENT — PAIN SCALES - GENERAL: PAINLEVEL_OUTOF10: 0

## 2021-01-29 NOTE — PROGRESS NOTES
ACUTE REHAB UNIT  SPEECH/LANGUAGE PATHOLOGY      [x] Daily  [] Weekly Care Conference Note  [] Discharge    Deniz Spears     OJI:[birth year]  CLS:[id number]  Room #: CMA-6396/3595-26   Rehab Dx/Hx: Radha Yuan [R53.81]  Date of Admit: 1/15/2021      Precautions: falls and aspirations  Home situation: Pt lives at home with several family members. Pt required assistance prior to admission   ST Dx: Oropharyngeal dysphagia; Dysarthria; Cognitive linguistic deficits     Daily Treatment Info:   Date: 1/29/2021   Tx session 1 Tx session 2   Pain Denied Denied   Subjective   Pt seen sitting upright in chair following PT session. Pt alert and cooperative during session. Pt seen sitting upright in chair. He was alert and cooperative. Pt had just finished lunch meal--filled out calorie count and placed in envelope in room. Objective:  Goals     Pt will tolerate recommended diet level with no overt s/s aspiration   - SLP brought in french toast for pt to trail during session. [facility]  - Pt had french toast (halved) with butter and syrup.   - Pt had no overt clinical s/s of aspiration or penetration on 4/4 bites.   -Pt averaged 3 swallows per bite    Thin liquid (ginger ale) via cup  - Pt had no overt clinical s/s of aspiration or penetration on 2/2 sips. - Averaged 2 swallows per sip. - Pt reported no swallowing problems with lunch meal.        Pt will complete a variety of swallow maneuvers in order to improve swallow function. Goal not targeted this session. Goal not targeted this session. Pt will recall and utilize safe swallow strategies in order to improve safety during po intake. Recall of Swallow Strategies  - Pt able to recall using hard swallow during meals but stated to use \"big bites\". Required cues to take small bites instead. Pt implementing strategy to swallow each bite before taking another more frequently today. Goal not targeted this session.       Pt will improve vocal volume and use LOUD speech across graded tasks with 80% accuracy and carry over to the conversational environment with >min cues. Goal not targeted this session. Max Phonation Time  - Average time: 11.6 seconds  - Average dB: 78 dB    Speech tasks  -High pitch /I/ x 5 for 5 seconds  -Low pitch /u/  x 5 for 5 seconds  -Pt averaged 75 dB across all trials    -Pt reported all speech/voice tasks required less effort and were easier than previous dates. Patient will complete convergent and divergent naming tasks for improved thought organization with > 80% accuracy. Convergent Naming Task  - 8/15 (53%) independently  - 12/15 (80%) given min verbal cues and repetition x 1. Divergent Naming: Selecting Category Member   - 15/15 (100%) independently  - Time: 4:05 minutes to complete task   Pt will complete short term memory tasks and recall fx information for improved recall and carryover of information from day to day. Fx recall:  -pt did not recall BP measurement in PT  -did not recall planned d/c date  -recalled one safe swallow precaution   -misidentified one swallow strategies (indicated he should take \"large bites\")   Short Term Memory: Category Inclusion  - 2/4 (50%) independently  - Pt's accuracy increased to 3/4 (75%) with repetition x 1. Other areas targeted:  Time Word Problems  - 1/4 (25%) independently  - 2/4 (50%) with min verbal cues  - 4/4 (100%) with mod verbal/visual cues  -Pt stated he was having a harder time concentrating on the task.   -pt benefited from drawing clock on paper as visual cue for counting time     Education:   Provided pt with ongoing rationale re speech tasks and POC. Pt v/u, requires ongoing learning. Provided pt with ongoing rationale re speech tasks and POC. Pt v/u, requires ongoing learning.     Safety Devices: [x] Call light within reach  [x] Chair alarm activated  [] Bed alarm activated  [x] Other: Camera in room [x] Call light within reach  [x] Chair alarm activated  [] Bed alarm activated  [x] Other: Camera in room     Assessment:   Speech Therapy Diagnosis  Cognitive Diagnosis: Pt presents with mild-moderate cognitive deficits in the domains of orientation, thought organization, short term memory, and working memory. Speech Diagnosis: Pt presents with moderate dysarthria charatcerized by reduced breath support, vocal intensity, and articulatory precision. Current Diet Order: DIET DYSPHAGIA MINCED AND MOIST; No Caffeine, No Drinking Straw  Dietary Nutrition Supplements: Other Oral Supplement (see comment)  Dietary Nutrition Supplements: Standard High Calorie Oral Supplement   Recommended Form of Meds: Crushed in puree as able  Compensatory Swallowing Strategies: Alternate solids and liquids, Eat/Feed slowly, No straws, Upright as possible for all oral intake, Remain upright for 30-45 minutes after meals, Small bites/sips     Plan:     Frequency:  5days/week   30-60  Minutes/day; attempting 60 minutes of treatment, will addend POC pending pt's tolerance     Discharge Recommendations:   Barriers: long term dysphagia; nutritional compromise; Discharge Recommendations:  [] Home independently  [x] Home with assistance []  24 hour supervision  [] SNF [] Other:  Continued SLP Treatment:  [x] Yes [] No [] TBD based on progress while on ARU [] Vital Stim indicated [] Other:   Estimated discharge date: 2/2/21    Type of Total Treatment Minutes   Session 1   Session 2   Time In [id number]   Time Out 1030 1315   Timed Code Minutes  10 30   Individual Treatment Minutes  30 30   Co-Treatment Minutes      Group Treatment Minutes      Concurrent Treatment Minutes        TOTAL DAILY MINUTES: 60    Electronically Signed by    ROSETTE Guerra Pathologist  1/29/2021 1:35 PM     The speech-language pathologist was present, directed the patient's care, made skilled judgment and was responsible for assessment and treatment.     KHADAR Godoy,  Speech-Language Pathology

## 2021-01-29 NOTE — PROGRESS NOTES
Physical Therapy  Facility/Department: [facility] ACUTE REHAB UNIT  Daily Treatment Note  NAME: Kallie Ortiz  : 1940  MRN: [id number]    Date of Service: 2021    Discharge Recommendations:  24 hour supervision or assist, S Level 3, Home with Home health PT   PT Equipment Recommendations  Equipment Needed: No  Other: pt owns RW    Assessment   Body structures, Functions, Activity limitations: Decreased functional mobility ; Decreased ROM; Decreased safe awareness;Decreased endurance;Decreased balance;Decreased posture;Decreased ADL status; Decreased strength  Assessment: Pt able to participate in therapy well today due to BP more stable and asymptomatic. Less impulsive when ambulating with RW and during transfers. Progressing towards SBA/Supervision. Safety cues on stairs and when approaching seated surfaces. Continued skilled PT to promote safe discharge home. Treatment Diagnosis: impaired functional mobility  Prognosis: Good  Exam: MMT, ROM, functional mobility assessment  Clinical Presentation: evolving  PT Education: PT Role;Goals;Plan of Care;General Safety;Transfer Training;Energy Conservation;Gait Training;Functional Mobility Training  Patient Education: Pt verbalizes understanding, but demonstrates slight cognitive delay so could benefit from reinforcement  Barriers to Learning: cognitive deficits  REQUIRES PT FOLLOW UP: Yes  Activity Tolerance  Activity Tolerance: Patient Tolerated treatment well     Patient Diagnosis(es): There were no encounter diagnoses. has a past medical history of CAD (coronary artery disease), COPD (chronic obstructive pulmonary disease) ([address] Coronary artery bypass, Coronary stent, Hyperlipidemia, Hypertension, Osteoarthritis, Parkinson disease ([address] Prostate cancer ([address] and Rotator cuff syndrome. has a past surgical history that includes Prostatectomy (); Colonoscopy (); Coronary artery bypass graft; Cardiac surgery ();  Coronary angioplasty (); Coronary angioplasty (2013); Cataract removal with implant (2015); sigmoidoscopy (N/A, 1/25/2019); and Colonoscopy (N/A, 8/30/2019). Restrictions  Restrictions/Precautions  Restrictions/Precautions: Fall Risk, Modified Diet  Required Braces or Orthoses?: No  Position Activity Restriction  Sternal Precautions: . Other position/activity restrictions: 73-year-old gentleman with history of CAD status post PCI 1/8/2021 for NSTEMI with JAMES x2 to RCA is a direct admit from outside hospital with complaint of chest pain. Troponin is mildly elevated and stable. EKG is nonspecific and unchanged from prior. Cardiology evaluation complete and patient is cleared to go home from cardiology standpoint. Subjective   General  Chart Reviewed: Yes  Response To Previous Treatment: Patient with no complaints from previous session. Family / Caregiver Present: No  Referring Practitioner: Charo Do MD  Subjective  Subjective: Feeling good today. General Comment  Comments: sitting in chair at arrival, BP at rest seated 135/78. Very minimal tremors in hands observed today. Pain Screening  Patient Currently in Pain: Denies  Vital Signs  Patient Currently in Pain: Denies       Orientation  Orientation  Overall Orientation Status: Within Functional Limits  Cognition      Objective   Bed mobility  Sit to Supine: Unable to assess  Transfers  Sit to Stand: Stand by assistance  Stand to sit: Stand by assistance  Ambulation  Ambulation?: Yes  More Ambulation?: Yes  Ambulation 1  Surface: level tile  Device: Rolling Walker  Other Apparatus: Wheelchair follow  Assistance: Contact guard assistance  Quality of Gait: Less impulsive and staying within parameters of walker during first round,  flexed posture  Gait Deviations: Shuffles;Staggers  Distance: 342 ft  Comments:  Following ambulation, 114/71 BP  Ambulation 2  Surface - 2: level tile  Device 2: No device  Assistance 2: Contact guard assistance;Minimal assistance  Quality of Gait 2: unsteady, shuffling gait, reaching for environmental surfaces for support  Gait Deviations: Staggers; Shuffles;Decreased step length; Slow Stacey;Decreased step height;Decreased arm swing;Decreased head and trunk rotation  Distance: 25 ft  Ambulation 3  Surface - 3: level tile  Device 3: Rolling Walker  Other Apparatus 3: Wheelchair follow(2# cuff weights)  Assistance 3: Contact guard assistance  Quality of Gait 3: Pt demonstrates improved step length and step height with decreased shuffling on straight ambulatory paths, shuffling remains consistent during turns  Gait Deviations: Shuffles  Distance: 84 ft  Stairs/Curb  Stairs?: Yes  Stairs  # Steps : 12  Stairs Height: 6\"  Rails: Bilateral  Assistance: Stand by assistance  Comment: reciprocal pattern, safety cues for entire foot placement on step     Balance  Posture: Fair  Sitting - Static: Good  Sitting - Dynamic: Good  Standing - Static: Fair;+  Standing - Dynamic: Fair            Comment: 1st session: Pt coming to stand from recliner and ambulating as noted above. Pt brought to gym room and completing stair navigation as noted. Pt then brought to [facility] gym and completing ambulation without AD noted above. Pt also completing standing B D1 flexion/extension x10 for improved posture, CGA required for balance. Pt then ambulating back to room. All needs placed within reach. 2nd session: PT in chair at beginning of session, denied pain. BP reading at 126/73 prior to session. Pt ambulating 132 ft to gym and seated in w/c. BP rechecked, reading at 116/80. Pt completing stand TherEx with 2# cuff weights. Pt completing standing marching, hip abduction, SLR, and mini squats, x10 BLE. Following standing THerEX, BP reading at 99/64. Pt taking seated rest and completing seated THerEX, including hip abduction, hip adduction iso, and hamstring curls with Red T-band x10. Pt then ambulating to room. all needs placed within reach.               G-Code     OutComes Score AM-PAC Score             Goals  Short term goals  Time Frame for Short term goals: 7-14 days  Short term goal 1: Patient will perform bed mobility independently. Short term goal 2: Patient will perform bed-chair transfer MOD I w/ LRAD. Short term goal 3: Patient will ambulate 150' MOD I w/ LRAD. Short term goal 4: Patient will negotiate up/down 12 stairs w/ single railing MOD I to access 2nd floor kitchen.   Patient Goals   Patient goals : to get home    Plan    Plan  Times per week: 75 minutes/day, 5 days/week  Times per day: Daily  Current Treatment Recommendations: Strengthening, Balance Training, Functional Mobility Training, Transfer Training, Gait Training, Stair training, Home Exercise Program, Safety Education & Training, Endurance Training, Patient/Caregiver Education & Training, Equipment Evaluation, Education, & procurement, Positioning  Safety Devices  Type of devices: Gait belt, Telesitter in use, Nurse notified, All fall risk precautions in place, Patient at risk for falls, Call light within reach, Chair alarm in place, Left in chair  Restraints  Initially in place: No     Therapy Time   Individual Concurrent Group Co-treatment   Time In 0930         Time Out 1000         Minutes 30         Timed Code Treatment Minutes: 30 Minutes     Second Session Therapy Time:   [facility]   Time In 1315         Time Out 1400         Minutes 45           Timed Code Treatment Minutes:  75    Total Treatment Minutes:  [address] [facility], PT    Kayleigh Porras, JOSIAHT, [id number]

## 2021-01-29 NOTE — PROGRESS NOTES
Occupational Therapy  Facility/Department: [facility] ACUTE REHAB UNIT  Daily Treatment Note  NAME: Ching Lara  : 1940  MRN: [id number]    Date of Service: 2021    Discharge Recommendations:  Home with Home health OT, 24 hour supervision or assist, S Level 3  OT Equipment Recommendations  Walker: Rolling  ADL Assistive Devices: Shower Chair with back;Grab Bars - toilet    Assessment   Performance deficits / Impairments: Decreased functional mobility ; Decreased endurance;Decreased ADL status; Decreased safe awareness;Decreased cognition;Decreased fine motor control;Decreased ROM; Decreased strength  Assessment: Pt is not at baseline level of function and will benefit from continued OT to address the above limitations. Pt with improved tolerance to upright activity this date as evidence to complete grooming tasks in stance and engage in functional mobility for ADLs in bathroom. Prognosis: Fair  OT Education: OT Role;Plan of Care;ADL Adaptive Strategies;Transfer Training;Precautions  Patient Education: pt verbalized understanding but would benefit from reinforcement for increased carryover  Barriers to Learning: Cognition  REQUIRES OT FOLLOW UP: Yes  Activity Tolerance  Activity Tolerance: Patient Tolerated treatment well  Activity Tolerance: 115/70 sitting up in chair, 66/36 standing for 1 min, 152/82 returning to sitting up in chair with feet up, 104/66 after functional mobility in room, 111/67 ADLs in bathroom  Safety Devices  Safety Devices in place: Yes  Type of devices: Call light within reach; Patient at risk for falls; All fall risk precautions in place;Nurse notified; Left in chair;Chair alarm in place         Patient Diagnosis(es):      has a past medical history of CAD (coronary artery disease), COPD (chronic obstructive pulmonary disease) ([facility] [address] Coronary artery bypass, Coronary stent, Hyperlipidemia, Hypertension, Osteoarthritis, Parkinson disease ([facility] [address] Prostate cancer ([facility] [address] and Rotator cuff syndrome. has a past surgical history that includes Prostatectomy (2004); Colonoscopy (2005); Coronary artery bypass graft; Cardiac surgery (1998); Coronary angioplasty (1993); Coronary angioplasty (2013); Cataract removal with implant (2015); sigmoidoscopy (N/A, 1/25/2019); and Colonoscopy (N/A, 8/30/2019). Restrictions  Restrictions/Precautions  Restrictions/Precautions: Fall Risk, Modified Diet  Required Braces or Orthoses?: No  Position Activity Restriction  Sternal Precautions: . Other position/activity restrictions: 69-year-old gentleman with history of CAD status post PCI 1/8/2021 for NSTEMI with JAMES x2 to RCA is a direct admit from outside hospital with complaint of chest pain. Troponin is mildly elevated and stable. EKG is nonspecific and unchanged from prior. Cardiology evaluation complete and patient is cleared to go home from cardiology standpoint. Subjective   General  Chart Reviewed: Yes  Additional Pertinent Hx: CAD, CABG, COPD, HTN, prostate cancer, OA  Response to previous treatment: Patient with no complaints from previous session  Family / Caregiver Present: No  Diagnosis: Debility  Subjective  Subjective: Pt seated in chair at begining of session. Pleseant and agreeable.   General Comment  Comments: RN okay for therapy  Vital Signs  Patient Currently in Pain: No   Orientation  Orientation  Overall Orientation Status: Within Functional Limits  Objective    ADL  Grooming: Stand by assistance(hang hygiene stand at sink)  LE Dressing: Supervision(donning tennis shoes)  Toileting: Stand by assistance(BM)        Balance  Sitting Balance: Modified independent   Standing Balance: Contact guard assistance(progressing to SBA)  Standing Balance  Time: up to 3 mins, 1 min, x30 seconds  Activity: sit to stand, toliet transfer, grooming, functional mobility in bathroom  Comment: improved standing tolerance this date despite varying BP  Functional Mobility  Functional - Mobility Device: Rolling pt asymptomatic despite the drop in BP. Pt ambulated back to room with use of RW and SBA and left in chair, call light near, chair alarm on, and all needs met. Plan   Plan  Times per week: 5-7x/wk, 75 min  Times per day: Daily  Current Treatment Recommendations: Strengthening, ROM, Functional Mobility Training, Endurance Training, Safety Education & Training, Self-Care / ADL, Equipment Evaluation, Education, & procurement, Patient/Caregiver Education & Training, Balance Training       Goals  Short term goals  Time Frame for Short term goals: 2 weeks  Short term goal 1: Supervision for bathing- not met, progressing  Short term goal 2: Mod I dressing- not met, progressing  Short term goal 3: Mod I toileting- Not met progressing  Short term goal 4: Mod I for functional transfers- Not met progressing  Short term goal 5: Mod I for functional mobility- Not met progressing  Short term goal 6: Mod I grooming- GOAL MET 1/27 while seated in w/c  Long term goals  Time Frame for Long term goals : STGs= LTGs  Patient Goals   Patient goals : Go home with wife       Therapy Time   Individual Concurrent Group Co-treatment   Time In 0830         Time Out 0915         Minutes 45         Timed Code Treatment Minutes: 45 Minutes     . Second Session Therapy Time:   Individual Concurrent Group Co-treatment   Time In 1038         Time Out 1108         Minutes 30           Timed Code Treatment Minutes:  45 + 30    Total Treatment Minutes:  75 minutes total    Rowan Ye OTR/L     Second Session:   Patrick Mitchell OTR/L [id number]

## 2021-01-29 NOTE — PROGRESS NOTES
Laura Charlton  1/29/2021  [id number]    Chief Complaint: Debility    Subjective:   No overnight events. No current complaints. Slept well overnight. [place] his BP was better yesterday. No reported cognitive worsening with decrease in sinemet. ROS: No CP, SOB, dyspnea    Objective:  Patient Vitals for the past 24 hrs:   BP Temp Temp src Pulse Resp SpO2 Weight   01/29/21 1130 109/69 -- -- 64 -- -- --   01/29/21 0730 (!) 178/96 97.6 °F (36.4 °C) Oral 69 16 98 % --   01/29/21 0615 -- -- -- -- -- -- 119 lb 4.3 oz (54.1 kg)   01/28/21 2315 (!) 149/98 97.5 °F (36.4 °C) Oral 67 16 98 % --     Gen: No distress, pleasant. Resting in bed. Thin  HEENT: Normocephalic, atraumatic. CV: Regular rate and rhythm. No MRG   Resp: No respiratory distress. CTAB   Abd: Soft, nontender nondistended  Ext: No edema. Neuro: Somnolent but easily arousable, oriented, appropriately interactive. Higher level cognitive deficits. Laboratory data: Available via EMR. Therapy progress:  PT  Position Activity Restriction  Sternal Precautions: . Other position/activity restrictions: 73-year-old gentleman with history of CAD status post PCI 1/8/2021 for NSTEMI with JAMES x2 to RCA is a direct admit from outside hospital with complaint of chest pain. Troponin is mildly elevated and stable. EKG is nonspecific and unchanged from prior. Cardiology evaluation complete and patient is cleared to go home from cardiology standpoint.   Objective     Sit to Stand: Stand by assistance  Stand to sit: Stand by assistance  Bed to Chair: Contact guard assistance(pt walked ~6' from EoB to chair)  Device: Rolling Walker  Other Apparatus: Wheelchair follow  Assistance: Contact guard assistance  Distance: 342 ft  OT  PT Equipment Recommendations  Equipment Needed: No  Other: pt owns RW  Toilet - Technique: Ambulating  Equipment Used: Standard toilet(+ R grab bar)  Toilet Transfers Comments: Increased assistance needed to transfer off toliet with use of grab bars.  Assessment        SLP  Current Diet : Dysphagia Minced and Moist (Dysphagia II)  Current Liquid Diet : Thin  Diet Solids Recommendation: Dysphagia Minced and Moist (Dysphagia II)  Liquid Consistency Recommendation: Thin    Body mass index is 17.61 kg/m². Assessment:  Patient Active Problem List   Diagnosis    COPD (chronic obstructive pulmonary disease)    Coronary artery disease    Hypertension    Hyperlipidemia    Fever    SOB (shortness of breath)    Vertigo    Exertional dyspnea    Paroxysmal atrial fibrillation (HCC)    Parkinson disease ([facility] [address]    Hypotension    Chest pain    Severe malnutrition ([facility] [address]    NSTEMI (non-ST elevated myocardial infarction) ([facility] [address]    Debility       Plan:   Debility: Complicated by Parkinson. PT/OT     Dysphagia: dysphagia diet, SLP     CAD: s/p CABG and recent PCI. Coreg decreased to 3.125     Parkinson: Sinemet - decreased     Orthostasis: midodrine increased to 10, Florinef 0.2. s/p IVF bolus. Allow permissive HTN. S/p IVF. Sinemet decreased     HLD: Lipitor 20     COPD: No current meds     Iron deficiency anemia: s/p supplementation     UTI: cipro completed    Hypokalemia: supplemented, resolved     Bowels: Per protocol  Bladder: Per protocol   Sleep: Trazodone provided prn  Pain: Tylenol, tramadol  DVT PPx: Lovenox    Khadra Lord MD 1/29/2021, 4:28 PM    * This document was created using dictation software. While all precautions were taken to ensure accuracy, errors may have occurred. Please disregard any typographical errors.

## 2021-01-29 NOTE — PROGRESS NOTES
Patient resting in bed. Vital signs stable. Ace wraps applied to bilateral lower extremities. abd binder applied. Up to chair. Patient tolerated well. Void per urinal. 200cc emptied. Assessment completed. See flow sheet. Call light in reach. Chair alarm on.

## 2021-01-30 PROCEDURE — 6370000000 HC RX 637 (ALT 250 FOR IP): Performed by: PHYSICAL MEDICINE & REHABILITATION

## 2021-01-30 PROCEDURE — 1280000000 HC REHAB R&B

## 2021-01-30 PROCEDURE — 2580000003 HC RX 258: Performed by: PHYSICAL MEDICINE & REHABILITATION

## 2021-01-30 PROCEDURE — 6360000002 HC RX W HCPCS: Performed by: PHYSICAL MEDICINE & REHABILITATION

## 2021-01-30 RX ADMIN — MIDODRINE HYDROCHLORIDE 10 MG: 5 TABLET ORAL at 11:48

## 2021-01-30 RX ADMIN — CARBIDOPA AND LEVODOPA 1 TABLET: 10; 100 TABLET ORAL at 09:18

## 2021-01-30 RX ADMIN — CARBIDOPA AND LEVODOPA 1 TABLET: 10; 100 TABLET ORAL at 13:51

## 2021-01-30 RX ADMIN — ASPIRIN 81 MG: 81 TABLET, CHEWABLE ORAL at 09:17

## 2021-01-30 RX ADMIN — FAMOTIDINE 20 MG: 20 TABLET, FILM COATED ORAL at 09:17

## 2021-01-30 RX ADMIN — CARVEDILOL 3.12 MG: 3.12 TABLET, FILM COATED ORAL at 09:20

## 2021-01-30 RX ADMIN — MIDODRINE HYDROCHLORIDE 10 MG: 5 TABLET ORAL at 06:11

## 2021-01-30 RX ADMIN — CARBIDOPA AND LEVODOPA 1 TABLET: 10; 100 TABLET ORAL at 21:09

## 2021-01-30 RX ADMIN — CARVEDILOL 3.12 MG: 3.12 TABLET, FILM COATED ORAL at 16:42

## 2021-01-30 RX ADMIN — POTASSIUM CHLORIDE 20 MEQ: 1500 TABLET, EXTENDED RELEASE ORAL at 09:20

## 2021-01-30 RX ADMIN — CLOPIDOGREL 75 MG: 75 TABLET, FILM COATED ORAL at 09:17

## 2021-01-30 RX ADMIN — ATORVASTATIN CALCIUM 20 MG: 20 TABLET, FILM COATED ORAL at 21:09

## 2021-01-30 RX ADMIN — ENOXAPARIN SODIUM 40 MG: 40 INJECTION SUBCUTANEOUS at 09:18

## 2021-01-30 RX ADMIN — MIDODRINE HYDROCHLORIDE 10 MG: 5 TABLET ORAL at 16:42

## 2021-01-30 RX ADMIN — FAMOTIDINE 20 MG: 20 TABLET, FILM COATED ORAL at 21:09

## 2021-01-30 RX ADMIN — FLUDROCORTISONE ACETATE 0.2 MG: 0.1 TABLET ORAL at 09:18

## 2021-01-30 RX ADMIN — Medication 10 ML: at 21:11

## 2021-01-30 ASSESSMENT — PAIN SCALES - GENERAL: PAINLEVEL_OUTOF10: 0

## 2021-01-31 PROCEDURE — 94760 N-INVAS EAR/PLS OXIMETRY 1: CPT

## 2021-01-31 PROCEDURE — 6360000002 HC RX W HCPCS: Performed by: PHYSICAL MEDICINE & REHABILITATION

## 2021-01-31 PROCEDURE — 6370000000 HC RX 637 (ALT 250 FOR IP): Performed by: PHYSICAL MEDICINE & REHABILITATION

## 2021-01-31 PROCEDURE — 1280000000 HC REHAB R&B

## 2021-01-31 RX ADMIN — FAMOTIDINE 20 MG: 20 TABLET, FILM COATED ORAL at 20:37

## 2021-01-31 RX ADMIN — ATORVASTATIN CALCIUM 20 MG: 20 TABLET, FILM COATED ORAL at 20:38

## 2021-01-31 RX ADMIN — POTASSIUM CHLORIDE 20 MEQ: 1500 TABLET, EXTENDED RELEASE ORAL at 09:05

## 2021-01-31 RX ADMIN — CARBIDOPA AND LEVODOPA 1 TABLET: 10; 100 TABLET ORAL at 09:05

## 2021-01-31 RX ADMIN — CARBIDOPA AND LEVODOPA 1 TABLET: 10; 100 TABLET ORAL at 15:24

## 2021-01-31 RX ADMIN — MIDODRINE HYDROCHLORIDE 10 MG: 5 TABLET ORAL at 15:24

## 2021-01-31 RX ADMIN — CARVEDILOL 3.12 MG: 3.12 TABLET, FILM COATED ORAL at 17:26

## 2021-01-31 RX ADMIN — FAMOTIDINE 20 MG: 20 TABLET, FILM COATED ORAL at 09:05

## 2021-01-31 RX ADMIN — CARVEDILOL 3.12 MG: 3.12 TABLET, FILM COATED ORAL at 09:06

## 2021-01-31 RX ADMIN — MIDODRINE HYDROCHLORIDE 10 MG: 5 TABLET ORAL at 12:21

## 2021-01-31 RX ADMIN — TRAZODONE HYDROCHLORIDE 50 MG: 50 TABLET ORAL at 20:38

## 2021-01-31 RX ADMIN — ASPIRIN 81 MG: 81 TABLET, CHEWABLE ORAL at 09:05

## 2021-01-31 RX ADMIN — CARBIDOPA AND LEVODOPA 1 TABLET: 10; 100 TABLET ORAL at 20:38

## 2021-01-31 RX ADMIN — ENOXAPARIN SODIUM 40 MG: 40 INJECTION SUBCUTANEOUS at 09:06

## 2021-01-31 RX ADMIN — FLUDROCORTISONE ACETATE 0.2 MG: 0.1 TABLET ORAL at 09:09

## 2021-01-31 RX ADMIN — MIDODRINE HYDROCHLORIDE 10 MG: 5 TABLET ORAL at 06:30

## 2021-01-31 RX ADMIN — CLOPIDOGREL 75 MG: 75 TABLET, FILM COATED ORAL at 09:05

## 2021-01-31 ASSESSMENT — PAIN SCALES - GENERAL: PAINLEVEL_OUTOF10: 0

## 2021-02-01 LAB
ANION GAP SERPL CALCULATED.3IONS-SCNC: 11 MMOL/L (ref 3–16)
BILIRUBIN URINE: ABNORMAL
BLOOD, URINE: NEGATIVE
BUN BLDV-MCNC: 26 MG/DL (ref 7–20)
CALCIUM SERPL-MCNC: 9.2 MG/DL (ref 8.3–10.6)
CHLORIDE BLD-SCNC: 103 MMOL/L (ref 99–110)
CLARITY: ABNORMAL
CO2: 26 MMOL/L (ref 21–32)
COLOR: YELLOW
CREAT SERPL-MCNC: 1 MG/DL (ref 0.8–1.3)
CRYSTALS, UA: ABNORMAL /HPF
EPITHELIAL CELLS, UA: 0 /HPF (ref 0–5)
GFR AFRICAN AMERICAN: >60
GFR NON-AFRICAN AMERICAN: >60
GLUCOSE BLD-MCNC: 92 MG/DL (ref 70–99)
GLUCOSE URINE: NEGATIVE MG/DL
HCT VFR BLD CALC: 31.4 % (ref 40.5–52.5)
HEMOGLOBIN: 10.2 G/DL (ref 13.5–17.5)
HYALINE CASTS: 0 /LPF (ref 0–8)
KETONES, URINE: ABNORMAL MG/DL
LEUKOCYTE ESTERASE, URINE: NEGATIVE
MCH RBC QN AUTO: 29 PG (ref 26–34)
MCHC RBC AUTO-ENTMCNC: 32.6 G/DL (ref 31–36)
MCV RBC AUTO: 89.1 FL (ref 80–100)
MICROSCOPIC EXAMINATION: YES
NITRITE, URINE: NEGATIVE
PDW BLD-RTO: 15.5 % (ref 12.4–15.4)
PH UA: 6 (ref 5–8)
PLATELET # BLD: 138 K/UL (ref 135–450)
PMV BLD AUTO: 8.2 FL (ref 5–10.5)
POTASSIUM SERPL-SCNC: 3.4 MMOL/L (ref 3.5–5.1)
PROTEIN UA: ABNORMAL MG/DL
RBC # BLD: 3.52 M/UL (ref 4.2–5.9)
RBC UA: 2 /HPF (ref 0–4)
SODIUM BLD-SCNC: 140 MMOL/L (ref 136–145)
SPECIFIC GRAVITY UA: 1.02 (ref 1–1.03)
URINE REFLEX TO CULTURE: ABNORMAL
URINE TYPE: ABNORMAL
UROBILINOGEN, URINE: 1 E.U./DL
WBC # BLD: 6.5 K/UL (ref 4–11)
WBC UA: 1 /HPF (ref 0–5)

## 2021-02-01 PROCEDURE — 97530 THERAPEUTIC ACTIVITIES: CPT

## 2021-02-01 PROCEDURE — 97129 THER IVNTJ 1ST 15 MIN: CPT

## 2021-02-01 PROCEDURE — 97130 THER IVNTJ EA ADDL 15 MIN: CPT

## 2021-02-01 PROCEDURE — 81001 URINALYSIS AUTO W/SCOPE: CPT

## 2021-02-01 PROCEDURE — 6370000000 HC RX 637 (ALT 250 FOR IP): Performed by: PHYSICAL MEDICINE & REHABILITATION

## 2021-02-01 PROCEDURE — 80048 BASIC METABOLIC PNL TOTAL CA: CPT

## 2021-02-01 PROCEDURE — 92526 ORAL FUNCTION THERAPY: CPT

## 2021-02-01 PROCEDURE — 1280000000 HC REHAB R&B

## 2021-02-01 PROCEDURE — 97110 THERAPEUTIC EXERCISES: CPT

## 2021-02-01 PROCEDURE — 97535 SELF CARE MNGMENT TRAINING: CPT

## 2021-02-01 PROCEDURE — 85027 COMPLETE CBC AUTOMATED: CPT

## 2021-02-01 PROCEDURE — 97116 GAIT TRAINING THERAPY: CPT

## 2021-02-01 PROCEDURE — 36415 COLL VENOUS BLD VENIPUNCTURE: CPT

## 2021-02-01 PROCEDURE — 6360000002 HC RX W HCPCS: Performed by: PHYSICAL MEDICINE & REHABILITATION

## 2021-02-01 RX ADMIN — ENOXAPARIN SODIUM 40 MG: 40 INJECTION SUBCUTANEOUS at 08:44

## 2021-02-01 RX ADMIN — FAMOTIDINE 20 MG: 20 TABLET, FILM COATED ORAL at 20:47

## 2021-02-01 RX ADMIN — ATORVASTATIN CALCIUM 20 MG: 20 TABLET, FILM COATED ORAL at 20:47

## 2021-02-01 RX ADMIN — CARBIDOPA AND LEVODOPA 1 TABLET: 10; 100 TABLET ORAL at 08:45

## 2021-02-01 RX ADMIN — MIDODRINE HYDROCHLORIDE 10 MG: 5 TABLET ORAL at 17:25

## 2021-02-01 RX ADMIN — POTASSIUM CHLORIDE 20 MEQ: 1500 TABLET, EXTENDED RELEASE ORAL at 08:45

## 2021-02-01 RX ADMIN — FLUDROCORTISONE ACETATE 0.2 MG: 0.1 TABLET ORAL at 08:49

## 2021-02-01 RX ADMIN — CARVEDILOL 3.12 MG: 3.12 TABLET, FILM COATED ORAL at 17:25

## 2021-02-01 RX ADMIN — CARBIDOPA AND LEVODOPA 1 TABLET: 10; 100 TABLET ORAL at 20:47

## 2021-02-01 RX ADMIN — CARBIDOPA AND LEVODOPA 1 TABLET: 10; 100 TABLET ORAL at 14:42

## 2021-02-01 RX ADMIN — FAMOTIDINE 20 MG: 20 TABLET, FILM COATED ORAL at 08:46

## 2021-02-01 RX ADMIN — CARVEDILOL 3.12 MG: 3.12 TABLET, FILM COATED ORAL at 08:46

## 2021-02-01 RX ADMIN — ASPIRIN 81 MG: 81 TABLET, CHEWABLE ORAL at 08:45

## 2021-02-01 RX ADMIN — MIDODRINE HYDROCHLORIDE 10 MG: 5 TABLET ORAL at 11:56

## 2021-02-01 RX ADMIN — CLOPIDOGREL 75 MG: 75 TABLET, FILM COATED ORAL at 08:46

## 2021-02-01 RX ADMIN — MIDODRINE HYDROCHLORIDE 10 MG: 5 TABLET ORAL at 06:48

## 2021-02-01 NOTE — PROGRESS NOTES
Selvin [facility]  2/1/2021  [id number]    Chief Complaint: Debility    Subjective:   Hallucinating over the weekend. Somewhat confused today but just waking up. No current complaints. BP has been less volatile. UA unremarkable. Labs reviewed. ROS: No CP, SOB, dyspnea    Objective:  Patient Vitals for the past 24 hrs:   BP Temp Temp src Pulse Resp SpO2   02/01/21 0738 129/76 98.2 °F (36.8 °C) Oral 86 16 --   01/31/21 2030 (!) 197/92 98.3 °F (36.8 °C) Oral 82 16 97 %   01/31/21 1800 -- -- -- -- -- 97 %     Gen: No distress, pleasant. Resting in bed. Thin  HEENT: Normocephalic, atraumatic. CV: Regular rate and rhythm. No MRG   Resp: No respiratory distress. CTAB   Abd: Soft, nontender nondistended  Ext: No edema. Neuro: Somnolent but easily arousable, oriented, appropriately interactive. Higher level cognitive deficits. Laboratory data: Available via EMR. Therapy progress:  PT  Position Activity Restriction  Sternal Precautions: . Other position/activity restrictions: 78-year-old gentleman with history of CAD status post PCI 1/8/2021 for NSTEMI with JAMES x2 to RCA is a direct admit from outside hospital with complaint of chest pain. Troponin is mildly elevated and stable. EKG is nonspecific and unchanged from prior. Cardiology evaluation complete and patient is cleared to go home from cardiology standpoint. Objective     Sit to Stand: Stand by assistance  Stand to sit: Stand by assistance  Bed to Chair: Contact guard assistance(pt walked ~6' from EoB to chair)  Device: Rolling Walker  Other Apparatus: Wheelchair follow  Assistance: Contact guard assistance  Distance: 342 ft  OT  PT Equipment Recommendations  Equipment Needed: No  Other: pt owns RW  Toilet - Technique: Ambulating  Equipment Used: Standard toilet(+ R grab bar)  Toilet Transfers Comments: Increased assistance needed to transfer off toliet with use of grab bars.   Assessment        SLP  Current Diet : Dysphagia Minced and Moist (Dysphagia II)  Current Liquid Diet : Thin  Diet Solids Recommendation: Dysphagia Minced and Moist (Dysphagia II)  Liquid Consistency Recommendation: Thin    Body mass index is 18.72 kg/m². Assessment:  Patient Active Problem List   Diagnosis    COPD (chronic obstructive pulmonary disease)    Coronary artery disease    Hypertension    Hyperlipidemia    Fever    SOB (shortness of breath)    Vertigo    Exertional dyspnea    Paroxysmal atrial fibrillation (HCC)    Parkinson disease ([facility] [address]    Hypotension    Chest pain    Severe malnutrition ([facility] [address]    NSTEMI (non-ST elevated myocardial infarction) ([facility] [address]    Debility       Plan:   Debility: Complicated by Parkinson. PT/OT     Dysphagia: dysphagia diet, SLP     CAD: s/p CABG and recent PCI. Coreg decreased to 3.125     Parkinson: Sinemet decreased     Orthostasis: midodrine increased to 10, Florinef 0.2. s/p IVF bolus. Allow permissive HTN. S/p IVF. Sinemet decreased     HLD: Lipitor 20     COPD: No current meds     Iron deficiency anemia: s/p supplementation     UTI: cipro completed    Hypokalemia: supplemented, resolved     Bowels: Per protocol  Bladder: Per protocol   Sleep: Trazodone provided prn  Pain: Tylenol, tramadol  DVT PPx: Lovenox - d/c, ambulating >250'    Dispo: Prep d/c for tomorrow. Pending cognition today. If still hallucinating today, will hold d/c.     Selvin Nesbitt was evaluated today and a DME order was entered for a wheeled walker because he requires this to successfully complete daily living tasks of personal cares and ambulating. A wheeled walker is necessary due to the patient's unsteady gait, upper body weakness, and inability to  an ambulation device; and he can ambulate only by pushing a walker instead of a lesser assistive device such as a cane, crutch, or standard walker. The need for this equipment was discussed with the patient and he understands and is in agreement.     Selvin Delicia was evaluated today and a DME order was entered for a bath/shower seat because he requires this to successfully complete daily living tasks of bathing and grooming. Patient requires a bath/shower seat due to weakness and limited ability to transfer/navigate the setting. Without the bath/shower seat, George Ricardo is at increased risk for falls and would require increased assistance for ADL's and mobility. Ania Parekh MD 2/1/2021, 9:54 AM    * This document was created using dictation software. While all precautions were taken to ensure accuracy, errors may have occurred. Please disregard any typographical errors.

## 2021-02-01 NOTE — CARE COORDINATION
MABLE spoke with patient's daughter Bienvenido Jordan regarding patient's discharge plan. Patient's spouse was recently admitted to [facility] in [place]. Patient's daughter stated that family is still anticipating patient discharging to home on 2/2/2021. Patient's daughter stating that she and her son will be able to provide the patient 24/7 care and support. Patient's daughter stated that family will pick patient up on 2/2/2021 at around 15 Noon. Patient to discharge with [facility]. Daughter states that patient has a shower chair and also has a Rolator at home. MABLE informed patient's RN, Miriam.     Electronically signed by MARYAN Streeter on 2/1/2021 at 11:45 AM

## 2021-02-01 NOTE — PATIENT CARE CONFERENCE
[facility]  Inpatient Rehabilitation  Weekly Team Conference Note    Patient Name: Boris Dangelo        MRN: [id number]    : 1940  (79 y.o.)  Gender: male   Referring Practitioner: Garett Heath MD  Diagnosis: chest pain    The team conference for this patient was held on 21 at 9:00am by:  Glenis Nguyen MD    CASE MANAGEMENT:  Assessment: Patient lives at home with his spouse, adult daughter and his grandson. Patient anticipates discharging to home with [facility] and 24 hour supervision. Patient's daughter stating that patient's family will be able to provide patient 24 hour supervision and support. PHYSICAL THERAPY:    Bed Mobility:   Scooting: Supervision    Transfers:  Sit to Stand: Stand by assistance  Stand to sit: Stand by assistance  Bed to Chair: Contact guard assistance(pt walked ~6' from EoB to chair)  Comment: 1st session: Pt coming to stand from recliner and ambulating as noted above. Pt brought to gym room and completing stair navigation as noted. [facility] and completing ambulation without AD noted above. Pt also completing standing B D1 flexion/extension x10 for improved posture, CGA required for balance. Pt then ambulating back to room. All needs placed within reach. 2nd session: PT in chair at beginning of session, denied pain. BP reading at 126/73 prior to session. Pt ambulating 132 ft to gym and seated in w/c. BP rechecked, reading at 116/80. Pt completing stand TherEx with 2# cuff weights. Pt completing standing marching, hip abduction, SLR, and mini squats, x10 BLE. Following standing THerEX, BP reading at 99/64. Pt taking seated rest and completing seated THerEX, including hip abduction, hip adduction iso, and hamstring curls with Red T-band x10. Pt then ambulating to room. all needs placed within reach.     Ambulation 1  Surface: level tile  Device: Rolling Walker  Other Apparatus: Wheelchair follow  Assistance: Contact guard assistance  Quality of Gait: Less impulsive and staying within parameters of walker during first round,  flexed posture  Gait Deviations: Shuffles, Staggers  Distance: 342 ft  Comments:  Following ambulation, 114/71 BP    Stairs  # Steps : 12  Stairs Height: 6\"  Rails: Bilateral  Curbs: 4\"  Assistance: Stand by assistance  Comment: reciprocal pattern, safety cues for entire foot placement on step    QM:  Roll Left and Right  Assistance Needed: Independent  CARE Score: 6  Discharge Goal: Independent  Sit to Lying  Assistance Needed: Setup or clean-up assistance  CARE Score: 5  Discharge Goal: Independent  Lying to Sitting on Side of Bed  Assistance Needed: Independent  CARE Score: 6  Discharge Goal: Independent  Sit to Stand  Assistance Needed: Supervision or touching assistance  CARE Score: 4  Discharge Goal: Independent  Chair/Bed-to-Chair Transfer  Assistance Needed: Supervision or touching assistance  CARE Score: 4  Discharge Goal: Independent  Car Transfer  Assistance Needed: Supervision or touching assistance  CARE Score: 4  Discharge Goal: Independent  Walk 10 Feet  Assistance Needed: Supervision or touching assistance  Physical Assistance Level: Less than 25%  Reason if not Attempted: Not attempted due to medical condition or safety concerns  CARE Score: 4  Discharge Goal: Independent  Walk 50 Feet with Two Turns  Assistance Needed: Supervision or touching assistance  Physical Assistance Level: Less than 25%  CARE Score: 4  Discharge Goal: Independent  Walk 150 Feet  Assistance Needed: Supervision or touching assistance  Physical Assistance Level: Less than 25%  Comment: used RW  Reason if not Attempted: Not attempted due to medical condition or safety concerns  CARE Score: 4  Discharge Goal: Independent  Walking 10 Feet on Uneven Surfaces  Assistance Needed: Partial/moderate assistance  CARE Score: 3  Discharge Goal: Independent  1 Step (Curb)  Assistance Needed: Supervision or touching assistance  Physical Assistance Level: Less than 25%  CARE Score: 4  Discharge Goal: Independent  4 Steps  Assistance Needed: Supervision or touching assistance  Physical Assistance Level: Less than 25%  CARE Score: 4  Discharge Goal: Independent  12 Steps  Assistance Needed: Supervision or touching assistance  Comment: orthostatic hypotension  Reason if not Attempted: Not attempted due to medical condition or safety concerns  CARE Score: 4  Picking Up Object  Assistance Needed: Supervision or touching assistance  CARE Score: 4  Discharge Goal: Independent  Wheelchair Ability  Uses a Wheelchair and/or Scooter?: No    SPEECH THERAPY:    Diet Level:DIET DYSPHAGIA MINCED AND MOIST; No Caffeine, No Drinking Straw  Dietary Nutrition Supplements: Other Oral Supplement (see comment)  Dietary Nutrition Supplements: Standard High Calorie Oral Supplement    Assessment: Plan for d/c for home tomorrow with assistance from family. Pt's progress has been inconsistent with notable fluctuations based on medical conditions (I.e. BP, medications). Overall diet tolerance has improved (increased intake, less signs of aspiration, improved to soft and bite size solids) via completion of oropharyngeal exercises and use of NMES. Voice and cognition progress also correlated with medical conditions and have fluctuated since admission. Recommend to continue with SLP treatment upon d/c with 24/7 supervision from family due to cognitive deficits.      OCCUPATIONAL THERAPY:    ADL:   ADL  Feeding: Setup(assist to setup lunch this date)  Grooming: Stand by assistance(hang hygiene stand at sink)  UE Bathing: Supervision, Setup, Verbal cueing(washed hair at w/c level this date, verbal cueing for thoroughness)  LE Bathing: Minimal assistance, Verbal cueing(pt able to bathe BLE and front tia care, min(A) provided for thoroughness of rear tia care)  UE Dressing: Minimal assistance(Assistance with donning and doffing shirt over head)  LE Dressing: Supervision(donning tennis shoes)  Toileting: Stand by assistance(BM)  Additional Comments: ACE bandages on LE placed by therapist.    Toilet Transfers: Toilet Transfers  Toilet - Technique: Ambulating  Equipment Used: Standard toilet(+ R grab bar)  Toilet Transfer: Stand by assistance  Toilet Transfers Comments: Increased assistance needed to transfer off toliet with use of grab bars. Tub/ShowerTransfers:     Shower Transfers  Shower Transfers Comments: ANNEMARIE, unsafe to attempt    QM:  Eating  Assistance Needed: Setup or clean-up assistance  CARE Score: 5  Discharge Goal: Independent  Oral Hygiene  Assistance Needed: Supervision or touching assistance  CARE Score: 4  Discharge Goal: Independent  Toileting Hygiene  Assistance Needed: Supervision or touching assistance  Comment: RN assist eith the urinal   CARE Score: 4  Discharge Goal: Independent  Toilet Transfer  Assistance Needed: Setup or clean-up assistance  Comment: (did not do this shift)  CARE Score: 5  Discharge Goal: Independent  Shower/Bathe Self  Assistance Needed: Partial/moderate assistance  CARE Score: 3  Discharge Goal: Supervision or touching assistance  Upper Body Dressing  Assistance Needed: Partial/moderate assistance  CARE Score: 3  Discharge Goal: Independent  Lower Body Dressing  Assistance Needed: Supervision or touching assistance  CARE Score: 4  Discharge Goal: Independent  Putting On/Taking Off Footwear  Assistance Needed: Substantial/maximal assistance  CARE Score: 2  Discharge Goal: Independent    NUTRITION:  Weight: 126 lb 12.2 oz (57.5 kg) / Body mass index is 18.72 kg/m². Diet Order: Dysphagia minced & moist    Supplements:Boot pudding & Ensure Enlive BID    Please see nutrition note for details.     NURSING:    Oj Hererra Fall Risk Score: 60  Wounds/Incisions/Ulcers: Skin tear left forearm  Medication Review: daily with patient  Pain: controlled with prescribed medications  Consultations/Labs/X-rays: Basic Metabolic Panel and CBC q MWF    Risk for Readmission: 18%    Patient/Family Education provided by team:    Discharge Plan   Estimated Length of Stay:0 days  Destination: home health  Pass:No  Services at Discharge: Other HHPT/OT S3  Equipment at Discharge: pt owns RW;  Shower chair with back, grab bars for toilet  Factors facilitating achievement of predicted outcomes: family support, pleasant, cooperative  Barriers to the achievement of predicted outcomes: cognition, BP concerns, Parkinsons  Patient Goals:to get home, Go home with wife,     Plan of Care  Interdisciplinary Individualized Plan of Care Review:    Continue Current Plan of Care:  Yes  Modifications:_____________________________    Rehab Team Members in attendance for Team Conference:  MARYAN Auguste, VANDANA Taylor RD, POLO Martin/ROBERT Macias PT, DPT    Brooke Garcia M.A., Mac Ji PT, DPT,     I approve the established interdisciplinary plan of care as documented within the medical record of Nayan Hale.     Gabe Goodman MD  Electronically signed by Gabe Goodman MD on 2/2/2021 at 9:15 AM'

## 2021-02-01 NOTE — PROGRESS NOTES
Patient resting comfortably in bed with eyes closed. Vital signs stable. Assessment completed. See flow sheet.

## 2021-02-01 NOTE — PROGRESS NOTES
Occupational Therapy  Facility/Department: [facility] ACUTE REHAB UNIT  Daily Treatment Note  NAME: Ching Lara  : 1940  MRN: [id number]    Date of Service: 2021    Discharge Recommendations:  Home with Home health OT, 24 hour supervision or assist, S Level 3  OT Equipment Recommendations  Equipment Needed: Yes  Walker: Rolling  ADL Assistive Devices: Shower Chair with back;Grab Bars - toilet    Assessment   Performance deficits / Impairments: Decreased functional mobility ; Decreased endurance;Decreased ADL status; Decreased safe awareness;Decreased cognition;Decreased fine motor control;Decreased ROM; Decreased strength  Assessment: Pt is not at baseline level of function and will benefit from continued OT to address the above limitations. Pt with improved tolerance to upright activity this date as evidence to complete grooming tasks in stance and engage in functional mobility for ADLs in bathroom. Treatment Diagnosis: Debility and decreased ADLs due to chest pain, Parkinsons recent NSTEMI. OT Education: OT Role;Plan of Care;ADL Adaptive Strategies;Transfer Training;Precautions  Patient Education: pt verbalized understanding but would benefit from reinforcement for increased carryover  Barriers to Learning: Cognition  REQUIRES OT FOLLOW UP: Yes  Activity Tolerance  Activity Tolerance: Patient Tolerated treatment well  Safety Devices  Safety Devices in place: Yes  Type of devices: Call light within reach; Patient at risk for falls; All fall risk precautions in place;Nurse notified; Left in chair;Chair alarm in place  Restraints  Initially in place: No         Patient Diagnosis(es): Debility      has a past medical history of CAD (coronary artery disease), COPD (chronic obstructive pulmonary disease) ([facility] [address] Coronary artery bypass, Coronary stent, Hyperlipidemia, Hypertension, Osteoarthritis, Parkinson disease ([facility] [address] Prostate cancer ([facility] [address] and Rotator cuff syndrome.    has a past surgical history that includes Prostatectomy (2004); Colonoscopy (2005); Coronary artery bypass graft; Cardiac surgery (1998); Coronary angioplasty (1993); Coronary angioplasty (2013); Cataract removal with implant (2015); sigmoidoscopy (N/A, 1/25/2019); and Colonoscopy (N/A, 8/30/2019). Restrictions  Restrictions/Precautions  Restrictions/Precautions: Fall Risk, Modified Diet  Required Braces or Orthoses?: No  Position Activity Restriction  Sternal Precautions: Pt BP at rest 114/69. With toliet transfer and grooming, 100/66, after returning to chair 124/80. Other position/activity restrictions: 29-year-old gentleman with history of CAD status post PCI 1/8/2021 for NSTEMI with JAMES x2 to RCA is a direct admit from outside hospital with complaint of chest pain. Troponin is mildly elevated and stable. EKG is nonspecific and unchanged from prior. Cardiology evaluation complete and patient is cleared to go home from cardiology standpoint. Subjective   General  Chart Reviewed: Yes  Additional Pertinent Hx: CAD, CABG, COPD, HTN, prostate cancer, OA  Response to previous treatment: Patient with no complaints from previous session  Family / Caregiver Present: No  Diagnosis: Debility  Subjective  Subjective: Pt seated in chair at begining of session. Pleseant and agreeable.   General Comment  Comments: RN okay for therapy  Vital Signs  Patient Currently in Pain: Denies   Orientation  Orientation  Overall Orientation Status: Within Functional Limits  Orientation Level: Oriented to time;Oriented to place;Oriented to situation;Oriented to person  Objective    ADL  Grooming: Stand by assistance  UE Bathing: Supervision;Setup;Verbal cueing  LE Bathing: Verbal cueing;Contact guard assistance  UE Dressing: Contact guard assistance;Verbal cueing;Setup  LE Dressing: Contact guard assistance;Verbal cueing;Setup  Toileting: Stand by assistance        Balance  Sitting Balance: Modified independent   Standing Balance: Contact guard assistance  Standing Balance  Time: 5 min  Activity: sit to stand, toliet transfer, grooming, functional mobility in bathroom  Comment: improved standing tolerance, pt reporting no complaints of drop in BP. Functional Mobility  Functional - Mobility Device: Rolling Walker  Activity: To/from bathroom  Assist Level: Contact guard assistance  Functional Mobility Comments: min cues with safety of RW and for hand placement. Toilet Transfers  Equipment Used: Grab bars  Toilet Transfer: Stand by assistance  Shower Transfers  Shower - Transfer From: [place] - Transfer Type: To and From  Shower - Transfer To: Shower seat with back  Shower - Technique: Ambulating  Shower Transfers: Contact Guard  Bed mobility  Comment: Pt up in chair at start and end of session. Unable to assess. Transfers  Sit to stand: Stand by assistance  Stand to sit: Stand by assistance  Transfer Comments: continues to require cues for safety. Cognition  Overall Cognitive Status: Exceptions  Arousal/Alertness: Delayed responses to stimuli  Following Commands: Follows one step commands with repetition; Follows one step commands with increased time  Attention Span: Attends with cues to redirect; Difficulty attending to directions  Memory: Decreased recall of recent events;Decreased short term memory;Decreased long term memory;Decreased recall of biographical Information;Decreased recall of precautions  Safety Judgement: Decreased awareness of need for safety;Decreased awareness of need for assistance  Problem Solving: Assistance required to generate solutions;Assistance required to identify errors made;Assistance required to correct errors made  Insights: Decreased awareness of deficits  Initiation: Requires cues for some  Sequencing: Requires cues for some  Cognition Comment: Pt demonstrating increased confusion. More trailing in conversation. increased difficulty with sequencing tasks.      Perception  Overall Perceptual Status: Impaired  Motor Planning: Cues to use objects appropriately                                   Plan   Plan  Times per week: 5-7x/wk, 75 min  Times per day: Daily  Current Treatment Recommendations: Strengthening, ROM, Functional Mobility Training, Endurance Training, Safety Education & Training, Self-Care / ADL, Equipment Evaluation, Education, & procurement, Patient/Caregiver Education & Training, Balance Training  G-Code     Additional treatment:   Pt participated in shower task, see above for levels. Pt BP at 116/74 at beginning of task. Pt with /72 at end of task. the patient with difficultly processing through task, requiring increased hand over hand assist to use appropriate grooming and bathing objects appropriately and to sequence tasks. Pt left in chair with alarm on and all needs within reach.      Goals  Short term goals  Time Frame for Short term goals: 2 weeks  Short term goal 1: Supervision for bathing- not met  Short term goal 2: Mod I dressing- not met  Short term goal 3: Mod I toileting- Not met  Short term goal 4: Mod I for functional transfers- Not met  Short term goal 5: Mod I for functional mobility- Not met  Short term goal 6: Mod I grooming- GOAL MET 1/27 while seated in w/c  Long term goals  Time Frame for Long term goals : STGs= LTGs  Patient Goals   Patient goals : Go home with wife       Therapy Time   Individual Concurrent Group Co-treatment   Time In 1030, 1415          Time Out 1100, 305         Minutes 30, 50             Timed Code Treatment Minutes:  80 min   Total Treatment Minutes:  80 min          Tim Nelson OT

## 2021-02-01 NOTE — PROGRESS NOTES
ACUTE REHAB UNIT  SPEECH/LANGUAGE PATHOLOGY      [x] Daily  [] Weekly Care Conference Note  [] Discharge    Deniz Spears     TUI:[birth year]  XGD:[id number]  Room #: PLASCENCIA-1454/3394-21   Rehab Dx/Hx: Radha Yuan [R53.81]  Date of Admit: 1/15/2021      Precautions: falls and aspirations  Home situation: Pt lives at home with several family members. Pt required assistance prior to admission   ST Dx: Oropharyngeal dysphagia; Dysarthria; Cognitive linguistic deficits     Daily Treatment Info:   Date: 2/1/2021   Tx session 1 Tx session 2   Pain None reported  None reported   Subjective   Pt in bed but willing to sit up for therapy session. Pt flat and disoriented in conversation - responded to orientation questions accurately. Pt in chair, waiting for lunch, and willing to participate in [address]  Remained confused in conversation about place and upcoming d/c plans. Plan for d/c for home tomorrow with assistance from family. Pt's progress has been inconsistent with notable fluctuations based on medical conditions (I.e. BP, medications). Overall diet tolerance has improved (increased intake, less signs of aspiration, improved to soft and bite size solids) via completion of oropharyngeal exercises and use of NMES. Voice and cognition progress also correlated with medical conditions and have fluctuated since admission. Recommend to continue with SLP treatment upon d/c with 24/7 supervision from family due to cognitive deficits.       Objective:  Goals     Pt will tolerate recommended diet level with no overt s/s aspiration   Trial of soft and bite sized solid (sausage and bread)   - initially requested to remove dentures prior to meals but accepted that it makes sense to keep them in while eating  - pt appropriately able to cut the food into bite sized pieces, slow but functional  - appropriate feeding rate (slow - norm)   - reported sausage felt hard but was able to masticate, slow but functional   - mild changes in vocal quality after 2-3 bites in a row   - pt has indicated that he is not likely to follow a ground up food diet upon discharge    recommend upgrade to soft and bite sized solids; ongoing use of strict aspiration precautions     Thin liquid via cup   - encouraged use of spoon, however, pt continued to drink from cup   - pt with delayed cough on 1/5 presentations     Medications whole in puree   - pt tolerated 8 pills in 3 spoons of applesauce   - no overt s/s of aspiration   - pt required additional education and encouragement to continue to take meds in puree at home vs with water; to reduce aspiration risk    Regular Solid (cracker)  - Pt ate half of cracker with no overt clinical s/s of aspiration. Averaged 2 swallows. Thin liquid (water) via cup  - Pt took 4 sips of water via cup with no overt s/s of aspiration.   - Averaged 2 swallows per sip. GOAL NOT MET, continues to demonstrate clinical signs of pharyngeal pooling and/or penetration/aspiration. Pt will complete a variety of swallow maneuvers in order to improve swallow function. Goal not targeted this session. Oropharyngeal Strengthening  - Pudding through straw x 2 (average time: 1 minute)  - CTAR x 5. Pt able to hold rolled up washcloth under chin independently. - Resistance against frozen bolus x 1, pt able to provide adequate resistance against SLP and initiate a swallow after 3 second hold. GOAL MET     Pt will recall and utilize safe swallow strategies in order to improve safety during po intake. Pt limited in his use of previously instructed safe swallow procedures (tsp sips of thin vs cup sips). He was willing to sit up in chair for the meal    - Provided pt with list of recommended diet textures/strategies once he leaves ARU (I.e. soft/bite sized, thin liquids, meds whole in puree, hard swallow, small bites).  Pt v/u but requested SLP go over diet texture recommendations with daughter at d/c tomorrow.   - SLP left diet texture recommendations /information on soft/bite sized foods on pt's end table. - Pt recalled using hard swallow during previous session with breakfast meal.     GOAL NOT MET, continues to require >min cues       Pt will improve vocal volume and use LOUD speech across graded tasks with 80% accuracy and carry over to the conversational environment with >min cues. Pt with moderately reduced vocal intensity. Required 3 clarification requests in structured conversation. Pt acknowledged quiet voice but attributed to feeling disgusted with himself. Pt with reduced vocal intensity, but louder than previous session. Required 2 clarification requests throughout session. GOAL NOT MET   Patient will complete convergent and divergent naming tasks for improved thought organization with > 80% accuracy. Goal not targeted this session. State the Abstract Category   - 4/10 (40%) independently  - 8/10 (80%) given min verbal cues and repetition x 1. GOAL NOT MET     Pt will complete short term memory tasks and recall fx information for improved recall and carryover of information from day to day. Recall of orientation information   - pt appropriately recalled month, date (within 1 date), year and place   - pt became disoriented to place in conversation and upon schedule review (\"I don't know how I'm going to get to PT\" - referred to having to drive; \"be careful walking outside there\")    - Pt oriented to place but initially disoriented to d/c situation (I.e. wife/daughter coming to hospital to stay with him vs going home at d/c). Pt able to become reoriented given visual/verbal min cues. Wrote out pt's d/c date and d/c situation (I.e. going home) on a piece paper for pt to refer to. GOAL NOT MET     Other areas targeted:     Education:   Provided pt with ongoing rationale re speech tasks and POC. Pt v/u, requires ongoing learning. Provided pt with ongoing rationale re speech tasks and POC.  Pt v/u, requires ongoing learning. Safety Devices: [x] Call light within reach  [x] Chair alarm activated  [] Bed alarm activated  [x] Other: Camera in room [x] Call light within reach  [x] Chair alarm activated  [] Bed alarm activated  [x] Other: Camera in room     Assessment:   Speech Therapy Diagnosis  Cognitive Diagnosis: Pt presents with mild-moderate cognitive deficits in the domains of orientation, thought organization, short term memory, and working memory. Speech Diagnosis: Pt presents with moderate dysarthria charatcerized by reduced breath support, vocal intensity, and articulatory precision. Current Diet Order: DIET DYSPHAGIA MINCED AND MOIST; No Caffeine, No Drinking Straw  Dietary Nutrition Supplements: Other Oral Supplement (see comment)  Dietary Nutrition Supplements: Standard High Calorie Oral Supplement   Recommended Form of Meds: Crushed in puree as able  Compensatory Swallowing Strategies: Alternate solids and liquids, Eat/Feed slowly, No straws, Upright as possible for all oral intake, Remain upright for 30-45 minutes after meals, Small bites/sips     Plan:     Frequency:  5days/week   30-60  Minutes/day; attempting 60 minutes of treatment, will addend POC pending pt's tolerance     Discharge Recommendations:   Barriers: long term dysphagia; nutritional compromise; Discharge Recommendations:  [] Home independently  [x] Home with assistance []  24 hour supervision  [] SNF [] Other:  Continued SLP Treatment:  [x] Yes [] No [] TBD based on progress while on ARU [] Vital Stim indicated [] Other:   Estimated discharge date: 2/2/21    Type of Total Treatment Minutes   Session 1   Session 2   Time In 0830 1245   Time Out 0900 1315   Timed Code Minutes  15 10   Individual Treatment Minutes  30 30   Co-Treatment Minutes      Group Treatment Minutes      Concurrent Treatment Minutes        TOTAL DAILY MINUTES: 60    Electronically Signed by    Samir Arzate M.A.  [facility]-SLP S.PRobb [id number]  Speech-Language Pathologist [phone]  2/1/2021 9:18 AM     The speech-language pathologist was present, directed the patient's care, made skilled judgment and was responsible for assessment and treatment.     AALIYAH Fall.,  Speech-Language Pathology

## 2021-02-01 NOTE — PROGRESS NOTES
Physical Therapy  Facility/Department: [facility] ACUTE REHAB UNIT  Daily Treatment Note/D/C Summary  NAME: Mervin Vela  : 1940  MRN: [id number]    Date of Service: 2021    Discharge Recommendations:  24 hour supervision or assist, S Level 3, Home with Home health PT   PT Equipment Recommendations  Equipment Needed: No  Other: pt owns RW    Assessment   Assessment: Pt. Anticipating d/c to home tomorrow, . Pt. will need 24 hour S/A of family (wife, daughter and grandson live with Pt.). This will be needed d/t Pt. unsteady intermittently and needs cues to process functional movement at times. This is also d/t Pt. having a tendency to be hypotensive when upright. Because of this, Pt. did not meet goals. Will benefit from Home PT, S3 level at d/c. Treatment Diagnosis: impaired functional mobility  Prognosis: Good  PT Education: PT Role;Goals;Plan of Care;General Safety;Transfer Training;Energy Conservation;Gait Training;Functional Mobility Training  Patient Education: Pt verbalizes understanding, but demonstrates cognitive delay and processing deficits, so could benefit from reinforcement  Barriers to Learning: cognitive deficits  REQUIRES PT FOLLOW UP: Yes  Activity Tolerance  Activity Tolerance: Patient Tolerated treatment well  Activity Tolerance: BP low to start session but with use of ACE wraps and Abdominal binder, Pt. tolerated well and maintained adequate BP     Patient Diagnosis(es): There were no encounter diagnoses. has a past medical history of CAD (coronary artery disease), COPD (chronic obstructive pulmonary disease) ([facility] [address] Coronary artery bypass, Coronary stent, Hyperlipidemia, Hypertension, Osteoarthritis, Parkinson disease ([address] Prostate cancer ([facility] [address] and Rotator cuff syndrome. has a past surgical history that includes Prostatectomy (); Colonoscopy (); Coronary artery bypass graft; Cardiac surgery (); Coronary angioplasty (); Coronary angioplasty ();  Cataract removal with implant (2015); sigmoidoscopy (N/A, 1/25/2019); and Colonoscopy (N/A, 8/30/2019). Restrictions  Restrictions/Precautions  Restrictions/Precautions: Fall Risk, Modified Diet  Required Braces or Orthoses?: No  Position Activity Restriction  Sternal Precautions: . Other position/activity restrictions: 58-year-old gentleman with history of CAD status post PCI 1/8/2021 for NSTEMI with JAMES x2 to RCA is a direct admit from outside hospital with complaint of chest pain. Troponin is mildly elevated and stable. EKG is nonspecific and unchanged from prior. Cardiology evaluation complete and patient is cleared to go home from cardiology standpoint. Subjective   General  Chart Reviewed: Yes  Response To Previous Treatment: Patient with no complaints from previous session. Family / Caregiver Present: No  General Comment  Comments: sitting in chair at arrival, BP undetectable. Supine /97. PT Ace wrapped LE's and applied abdominal binder. Pt. then able to participate in full session. Orientation  Orientation  Overall Orientation Status: Within Functional Limits  Cognition      Objective   Bed mobility  Rolling to Left: Supervision  Rolling to Right: Supervision  Supine to Sit: Supervision  Sit to Supine: Supervision  Scooting: Supervision  Comment: Flat bed and no use of rail. PT needs to Supervise d/t impaired safety and motor processing  Transfers  Sit to Stand: Supervision  Stand to sit: Supervision  Bed to Chair: Contact guard assistance  Car Transfer: Supervision  Comment: Pt. required verbal cues for improved processing on safely getting in/out of w/c. Ambulation  Ambulation?: Yes  Ambulation 1  Surface: level tile  Device: Rolling Walker  Other Apparatus: Wheelchair follow(d/t hypotension intermittently)  Assistance: Contact guard assistance  Quality of Gait: scuffing L LE consistently unless providing verbal cues to lift L LE. Pt. with narrow ARLENE and even tripping over foot x 1. Needs verbal cues for processing  Gait Deviations: Shuffles;Staggers  Distance: 280' x 1 and  Comments: /69 after ambulation and stairs and included at least 2,  90 degree turns and 2, 180 degree turns. Over uneven surface x 10' with Nisha  Stairs/Curb  Stairs?: Yes  Stairs  # Steps : 12  Stairs Height: 6\"  Rails: Bilateral  Curbs: 6\"  Device: Rolling walker  Assistance: Contact guard assistance  Comment: Reciprocal pattern on multiple steps and safety cues for the entire foot placment on the step. Pt. required redemonstration of the curb step as unable to process safe way to perform. Once redomonstrated, able to perform with CGA during 4 attempts. Balance  Sitting - Static: Good  Sitting - Dynamic: Good  Standing - Static: Good;-  Standing - Dynamic: Fair;+  Exercises  Hip Flexion: seated marches x 15 reps B LEs  Hip Abduction: Red T-band resistive x 15  Knee Long Arc Quad: x 15 B LE  Ankle Pumps: x 15 b LE         Second session: Sitting in BS chair, agreeable to therapy. /82. Curb step as documented above. Seated exercises as documented above. Pt. able to state safe plan of pressing life alert button if falls at home. Amb x 270' x 1 with CGA/Supervision with FWW with improved clearance of L foot this ambulation. Unsafe to attempt picking up object off floor d/t hypotension intermittently. Left sitting in chair with BS chair in place and call light in reach. 3rd session:  Pt sitting in chair at arrival. No pain reported. STS with Supervision. Ambulated 130 ft x 2 with RW and Supervision. Repeat STS transfers while holding kids ball 2x10 reps focusing on controlled descent. Standing unsupported dynamic reach at various angles 2x20 reps. Standing chest press with kids ball 2x10 reps. Left in chair, alarm on and needs in reach. Goals  Short term goals  Time Frame for Short term goals: 7-14 days  Short term goal 1: Patient will perform bed mobility independently.  - Goal not met 2/1  Short term goal 2: Patient will perform bed-chair transfer MOD I w/ LRAD.- Goal not met 2/1  Short term goal 3: Patient will ambulate 150' MOD I w/ LRAD.- Goal not met 2/1  Short term goal 4: Patient will negotiate up/down 12 stairs w/ single railing MOD I to access 2nd floor kitchen. - Goal not met 2/1  Patient Goals   Patient goals : to get home    0/4 STG's met d/t hypotension and cognition    Plan    Plan  Times per week: 75 minutes/day, 5 days/week  Times per day: Daily  Current Treatment Recommendations: Strengthening, Balance Training, Functional Mobility Training, Transfer Training, Gait Training, Stair training, Home Exercise Program, Safety Education & Training, Endurance Training, Patient/Caregiver Education & Training, Equipment Evaluation, Education, & procurement, Positioning  Safety Devices  Type of devices: Gait belt, Telesitter in use, Nurse notified, All fall risk precautions in place, Patient at risk for falls, Call light within reach, Chair alarm in place, Left in chair  Restraints  Initially in place: No     Therapy Time   Individual Concurrent Group Co-treatment   Time In 1000         Time Out 1030         Minutes 30         Timed Code Treatment Minutes: 30 Minutes     Second Session Therapy Time:   Individual Concurrent Group Co-treatment   Time In 1100         Time Out 1115         Minutes 15             Third Session Therapy Time:   Individual Concurrent Group Co-treatment   Time In 1125         Time Out 1155         Minutes 30           Timed Code Treatment Minutes:  75    Total Treatment Minutes:  75      1st and second session completed by:  Lucho Gomez, [address] [facility], [id number]     Third session: Luna Lorenzo PT, DPT [id number]

## 2021-02-02 VITALS
TEMPERATURE: 98.3 F | DIASTOLIC BLOOD PRESSURE: 81 MMHG | WEIGHT: 125.44 LBS | BODY MASS INDEX: 18.58 KG/M2 | HEART RATE: 68 BPM | SYSTOLIC BLOOD PRESSURE: 143 MMHG | RESPIRATION RATE: 16 BRPM | OXYGEN SATURATION: 96 % | HEIGHT: 69 IN

## 2021-02-02 PROCEDURE — 6360000002 HC RX W HCPCS: Performed by: PHYSICAL MEDICINE & REHABILITATION

## 2021-02-02 PROCEDURE — 6370000000 HC RX 637 (ALT 250 FOR IP): Performed by: PHYSICAL MEDICINE & REHABILITATION

## 2021-02-02 RX ORDER — POTASSIUM CHLORIDE 750 MG/1
10 TABLET, EXTENDED RELEASE ORAL
Qty: 30 TABLET | Refills: 2 | Status: SHIPPED
Start: 2021-02-02 | End: 2021-03-12 | Stop reason: ALTCHOICE

## 2021-02-02 RX ORDER — CARVEDILOL 3.12 MG/1
3.12 TABLET ORAL 2 TIMES DAILY WITH MEALS
Qty: 60 TABLET | Refills: 3 | Status: SHIPPED
Start: 2021-02-02 | End: 2021-03-12 | Stop reason: ALTCHOICE

## 2021-02-02 RX ORDER — FAMOTIDINE 20 MG/1
20 TABLET, FILM COATED ORAL 2 TIMES DAILY
Qty: 60 TABLET | Refills: 3 | Status: SHIPPED
Start: 2021-02-02 | End: 2021-03-12 | Stop reason: ALTCHOICE

## 2021-02-02 RX ORDER — MIDODRINE HYDROCHLORIDE 10 MG/1
10 TABLET ORAL
Qty: 90 TABLET | Refills: 3 | Status: SHIPPED | OUTPATIENT
Start: 2021-02-02

## 2021-02-02 RX ADMIN — POTASSIUM CHLORIDE 20 MEQ: 1500 TABLET, EXTENDED RELEASE ORAL at 09:42

## 2021-02-02 RX ADMIN — ENOXAPARIN SODIUM 40 MG: 40 INJECTION SUBCUTANEOUS at 09:33

## 2021-02-02 RX ADMIN — CARBIDOPA AND LEVODOPA 1 TABLET: 10; 100 TABLET ORAL at 12:55

## 2021-02-02 RX ADMIN — ASPIRIN 81 MG: 81 TABLET, CHEWABLE ORAL at 09:33

## 2021-02-02 RX ADMIN — CLOPIDOGREL 75 MG: 75 TABLET, FILM COATED ORAL at 09:33

## 2021-02-02 RX ADMIN — MIDODRINE HYDROCHLORIDE 10 MG: 5 TABLET ORAL at 09:33

## 2021-02-02 RX ADMIN — FLUDROCORTISONE ACETATE 0.2 MG: 0.1 TABLET ORAL at 09:42

## 2021-02-02 RX ADMIN — MIDODRINE HYDROCHLORIDE 10 MG: 5 TABLET ORAL at 06:43

## 2021-02-02 RX ADMIN — CARVEDILOL 3.12 MG: 3.12 TABLET, FILM COATED ORAL at 09:33

## 2021-02-02 RX ADMIN — CARBIDOPA AND LEVODOPA 1 TABLET: 10; 100 TABLET ORAL at 09:33

## 2021-02-02 RX ADMIN — FAMOTIDINE 20 MG: 20 TABLET, FILM COATED ORAL at 09:33

## 2021-02-02 ASSESSMENT — PAIN SCALES - GENERAL: PAINLEVEL_OUTOF10: 0

## 2021-02-02 NOTE — DISCHARGE SUMMARY
Physical Medicine & Rehabilitation  Discharge Summary     Patient Identification:  Selvin Nesbitt  : 1940  Admit date: 1/15/2021  Discharge date: 21  Attending provider: Anuel Cano MD        Primary care provider: No primary care provider on file. Discharge Diagnoses:   Patient Active Problem List   Diagnosis    COPD (chronic obstructive pulmonary disease)    Coronary artery disease    Hypertension    Hyperlipidemia    Fever    SOB (shortness of breath)    Vertigo    Exertional dyspnea    Paroxysmal atrial fibrillation (HCC)    Parkinson disease ([facility]    Hypotension    Chest pain    [facility]    NSTEMI (non-ST elevated myocardial infarction) ([facility]    Debility       Discharge Functional Status:    Physical therapy:  Bed Mobility: Scooting: Supervision  Transfers: Sit to Stand: Supervision  Stand to sit: Supervision  Bed to Chair: Contact guard assistance  Comment: Second session: Sitting in BS chair, agreeable to therapy. /82. Curb step as documented above. Seated exercises as documented above. Pt. able to state safe plan of pressing life alert button if falls at home., Ambulation 1  Surface: level tile  Device: Rolling Walker  Other Apparatus: Wheelchair follow(d/t hypotension intermittently)  Assistance: Contact guard assistance  Quality of Gait: scuffing L LE consistently unless providing verbal cues to lift L LE. Pt. with narrow ARLENE and even tripping over foot x 1. Needs verbal cues for processing  Gait Deviations: Shuffles, Staggers  Distance: 280' x 1 and  Comments: /69 after ambulation and stairs and included at least 2,  90 degree turns and 2, 180 degree turns. Over uneven surface x 10' with Taptu Card  # Steps : 12  Stairs Height: 6\"  Rails: Bilateral  Curbs: 6\"  Device: Rolling walker  Assistance: Contact guard assistance  Comment: Reciprocal pattern on multiple steps and safety cues for the entire foot placment on the step.   Pt. required redemonstration of the curb step as unable to process safe way to perform. Once redomonstrated, able to perform with CGA during 4 attempts. Mobility:  , PT Equipment Recommendations  Equipment Needed: No  Other: pt owns RW, Assessment: Pt. Anticipating d/c to home tomorrow, 2/2. Pt. will need 24 hour S/A of family (wife, daughter and grandson live with Pt.). This will be needed d/t Pt. unsteady intermittently and needs cues to process functional movement at times. This is also d/t Pt. having a tendency to be hypotensive when upright. Because of this, Pt. did not meet goals. Will benefit from Home PT, S3 level at d/c. Occupational therapy:  ,  , Assessment: Pt is not at baseline level of function and will benefit from continued OT to address the above limitations. Pt with improved tolerance to upright activity this date as evidence to complete grooming tasks in stance and engage in functional mobility for ADLs in bathroom. Speech therapy:       Inpatient Rehabilitation Course:   Kurt Naranjo is a 79 y.o. male admitted to inpatient rehabilitation on 1/15/2021 s/p Debility. The patient participated in an aggressive multidisciplinary inpatient rehabilitation program involving 3 hours of therapy per day, at least 5 days per week. He progressed well in therapy and was able be discharged home with home health care. His medical rehab course was significant for below:    Debility: Complicated by Parkinson. PT/OT     Dysphagia: dysphagia diet, SLP     CAD: s/p CABG and recent PCI. Coreg decreased to 3.125. Continue this adjusted regimen at discharge.     Parkinson: Sinemet decreased     Orthostasis: midodrine increased to 10, Florinef 0.2. s/p IVF bolus. Allow permissive HTN. S/p IVF. Sinemet decreased. Continue the suggested regimen at discharge. Prescription provided.   He should follow up with his neurologist for continued neurologic management of his Parkinson disease.     HLD: Lipitor 20     COPD: No current meds     Iron deficiency anemia: s/p supplementation. No further supplementation suggested.     UTI: cipro completed     Hypokalemia: supplemented, continue 10 MEQ supplementation at discharge. Prescription provided. Discharge Exam:  Gen: No distress, pleasant. Resting in bed. Thin  HEENT: Normocephalic, atraumatic. CV: Regular rate and rhythm. No MRG   Resp: No respiratory distress. CTAB   Abd: Soft, nontender nondistended  Ext: No edema. Neuro: Alert, oriented x4, appropriately interactive. Higher level cognitive deficits. Significant Diagnostics:   Lab Results   Component Value Date    CREATININE 1.0 02/01/2021    BUN 26 (H) 02/01/2021     02/01/2021    K 3.4 (L) 02/01/2021     02/01/2021    CO2 26 02/01/2021       Lab Results   Component Value Date    WBC 6.5 02/01/2021    HGB 10.2 (L) 02/01/2021    HCT 31.4 (L) 02/01/2021    MCV 89.1 02/01/2021     02/01/2021       Disposition:  Home in stable condition.     Follow-up:  See after visit summary from hospitalization    Discharge Medications:     Medication List      START taking these medications    carbidopa-levodopa  MG per tablet  Commonly known as: SINEMET  Take 1 tablet by mouth 3 times daily  Replaces: carbidopa-levodopa  MG Tbdp     famotidine 20 MG tablet  Commonly known as: PEPCID  Take 1 tablet by mouth 2 times daily     potassium chloride 10 MEQ extended release tablet  Commonly known as: KLOR-CON M  Take 1 tablet by mouth daily (with breakfast)        CHANGE how you take these medications    carvedilol 3.125 MG tablet  Commonly known as: COREG  Take 1 tablet by mouth 2 times daily (with meals)  What changed:   · medication strength  · how much to take     midodrine 10 MG tablet  Commonly known as: PROAMATINE  Take 1 tablet by mouth 3 times daily (before meals)  What changed:   · medication strength  · how much to take  · when to take this        CONTINUE taking these medications aspirin 81 MG EC tablet  Take 1 tablet by mouth daily  Notes to patient: Uses: blood thinning properties, heart health, & mild pain  Side Effects: nausea, constipation,diarrhea,headache     atorvastatin 20 MG tablet  Commonly known as: LIPITOR  Take 1 tablet by mouth nightly  Notes to patient: Uses: treatment of high cholesterol  Side Effects: muscle pain, nausea, vomiting, weakness,dizziness     clopidogrel 75 MG tablet  Commonly known as: PLAVIX  Take 1 tablet by mouth daily  Notes to patient: Uses: blood thinner  Side Effects: bruising, bleeding, chest pain     fludrocortisone 0.1 MG tablet  Commonly known as: FLORINEF  Take 2 tablets by mouth daily  Notes to patient: It is used to treat Gerald's disease. It is used to treat salt-losing diseases  You may have more chance of getting an infection. Wash hands often. Stay away from people with infections, colds, or flu. Have your eye pressure checked if you are on this drug for a long time     nitroGLYCERIN 0.4 MG SL tablet  Commonly known as: Nitrostat  Place 1 tablet under the tongue every 5 minutes as needed for Chest pain (up to max of 3 total doses. If no relief after 1 dose, call 911.) up to max of 3 total doses. If no relief after 1 dose, call 911.   Notes to patient: Uses: to treat chest pain  Side Effects: lowers blood pressure,headache, dizziness        STOP taking these medications    carbidopa-levodopa  MG Tbdp  Commonly known as: PARCOPA  Replaced by: carbidopa-levodopa  MG per tablet     ciprofloxacin 500 MG tablet  Commonly known as: CIPRO           Where to Get Your Medications      These medications were sent to [facility], [address] [place] [id number]    Phone: [phone]   · carbidopa-levodopa  MG per tablet  · carvedilol 3.125 MG tablet  · famotidine 20 MG tablet  · midodrine 10 MG tablet  · potassium chloride 10 MEQ extended release tablet         I spent over 35 minutes on this discharge encounter between counseling, coordination of care, and medication reconciliation. To comply with [facility] lucille [id number].[email or web address]:  Discharge order placed in advance to facilitate patient's discharge needs. Jordin Blanc MD    * This document was created using dictation software. While all precautions were taken to ensure accuracy, errors may have occurred. Please disregard any typographical errors.

## 2021-02-02 NOTE — PROGRESS NOTES
CLINICAL PHARMACY NOTE: MEDS TO [address] Select Patient?: No  Total # of Prescriptions Filled: 4   The following medications were delivered to the patient:  · Carvedilol 3.125mg  · Famotidine 20mg  · Potassium chloride ER 10  · Midodrine 10mg  Total # of Interventions Completed: 0  Time Spent (min): 30    Additional Documentation:  Delivered to Patient daughter  Transferring Sinemet to home pharmacy, did not have in [facility]

## 2021-02-02 NOTE — PROGRESS NOTES
Patient discharged home w/ DTR. AVS reviewed w/ both patient and DTR w/o any questions or concerns. Pt transport escorted patient to vehicle. No other questions or concerns voiced.

## 2021-02-05 ENCOUNTER — TELEPHONE (OUTPATIENT)
Dept: CARDIOLOGY CLINIC | Age: 81
End: 2021-02-05

## 2021-02-05 NOTE — TELEPHONE ENCOUNTER
[facility] calling pt was just discharged a few days ago from [facility], Went in for Angina got Stents placed, pt states he was feeling pretty good until today. His BP is 180/107 P63 [facility] tested 3 times they were all about the same range. Pt has no fever.  Needs to know what to do? pLs call to advise Thank you

## 2021-02-08 ENCOUNTER — TELEPHONE (OUTPATIENT)
Dept: CARDIOLOGY CLINIC | Age: 81
End: 2021-02-08

## 2021-02-08 NOTE — TELEPHONE ENCOUNTER
I spoke to his daughter and she will request transfer from his doctors at [facility] and [place] I can try to arrange transfer

## 2021-02-08 NOTE — TELEPHONE ENCOUNTER
Pt daughter calling pt is at [facility] and she's being trying to find out what's going on with pt and they aren't giving her information. They said they were doing an MRI she asked for the results and they said it hasn't been done yet. She would like to know if pt could get transferred here to [facility] so [facility] can look after him? She would like to speak to someone as soon as possible.  Pls call to advise Thank you

## 2021-03-02 NOTE — PLAN OF CARE
Problem: Falls - Risk of:  Goal: Will remain free from falls  Description: Will remain free from falls  Outcome: Ongoing Yes

## 2021-03-11 ENCOUNTER — TELEPHONE (OUTPATIENT)
Dept: INTERNAL MEDICINE CLINIC | Age: 81
End: 2021-03-11

## 2021-03-11 NOTE — TELEPHONE ENCOUNTER
This would normally be addressed at his hospital follow-up appointment. When is appointment? DME usually requires a face-to-face appointment for it to be paid for by insurance.

## 2021-03-11 NOTE — TELEPHONE ENCOUNTER
Pt daughter Maine Dexter calling---pt had gotten a wheelchair delivered but had set backs so stayed in the hospital---he is home now but the one at home is a little to big won't go through doors well---they are asking for a new script be sent to [address] [place] [address] for a   A drive wheelchair --smaller (for at home use) any questions please call Maine Renteria at [phone]. Thanks.

## 2021-03-11 NOTE — TELEPHONE ENCOUNTER
Called -- got VM   Left detailed msg for daughter that pt will need to be seen for a HFU appt before we can order anything for the pt   Dr Caroline Melgoza is retired and pt will be new to any provider here and will need a face to face appt in order for any orders to be valid   Advised to call for a sooner appt

## 2021-03-12 ENCOUNTER — VIRTUAL VISIT (OUTPATIENT)
Dept: PRIMARY CARE CLINIC | Age: 81
End: 2021-03-12
Payer: COMMERCIAL

## 2021-03-12 DIAGNOSIS — I48.0 PAROXYSMAL ATRIAL FIBRILLATION (HCC): ICD-10-CM

## 2021-03-12 DIAGNOSIS — E78.49 OTHER HYPERLIPIDEMIA: Primary | ICD-10-CM

## 2021-03-12 DIAGNOSIS — G20 PARKINSON DISEASE (HCC): ICD-10-CM

## 2021-03-12 DIAGNOSIS — R53.81 DEBILITY: ICD-10-CM

## 2021-03-12 DIAGNOSIS — I25.10 CORONARY ARTERY DISEASE INVOLVING NATIVE CORONARY ARTERY OF NATIVE HEART WITHOUT ANGINA PECTORIS: ICD-10-CM

## 2021-03-12 PROCEDURE — 99214 OFFICE O/P EST MOD 30 MIN: CPT | Performed by: NURSE PRACTITIONER

## 2021-03-12 PROCEDURE — 1111F DSCHRG MED/CURRENT MED MERGE: CPT | Performed by: NURSE PRACTITIONER

## 2021-03-12 RX ORDER — AMIODARONE HYDROCHLORIDE 200 MG/1
200 TABLET ORAL DAILY
COMMUNITY

## 2021-03-12 ASSESSMENT — PATIENT HEALTH QUESTIONNAIRE - PHQ9
SUM OF ALL RESPONSES TO PHQ QUESTIONS 1-9: 0
2. FEELING DOWN, DEPRESSED OR HOPELESS: 0

## 2021-03-12 NOTE — PATIENT INSTRUCTIONS
Patient Education        Learning About Coronary Artery Disease (CAD)  What is coronary artery disease? Coronary artery disease is a condition that occurs when plaque builds up in the arteries that bring oxygen-rich blood to your heart. Plaque is a fatty substance made of cholesterol, calcium, and other substances in the blood. This process is called hardening of the arteries, or atherosclerosis. What happens when you have coronary artery disease? · Plaque may narrow the coronary arteries. Narrowed arteries cause poor blood flow. This can lead to angina symptoms such as chest pain or discomfort. If blood flow is completely blocked, you could have a heart attack. · You can slow and reduce the risk of future problems by making changes in your lifestyle. These include quitting smoking and eating heart-healthy foods. · Treatment, along with changes in your lifestyle, can help you live a longer and healthier life. How can you prevent coronary artery disease? · Do not smoke. It may be the best thing you can do to prevent coronary artery disease. If you need help quitting, talk to your doctor about stop-smoking programs and medicines. These can increase your chances of quitting for good. · Be active. Try to do moderate activity at least 2½ hours a week. Or try to do vigorous activity at least 1¼ hours a week. You may want to walk or try other activities, such as running, swimming, cycling, or playing tennis or team sports. · Eat heart-healthy foods. Eat more fruits and vegetables and less food that contains saturated and trans fats. Limit alcohol, sodium, and sweets. · Stay at a healthy weight. Lose weight if you need to. · Manage other health problems such as diabetes, high blood pressure, and high cholesterol. How is coronary artery disease treated? · Your doctor will suggest that you make lifestyle changes.  For example, your doctor may ask you to eat healthy foods, quit smoking, lose extra weight, and be more active. · You will take medicines that help prevent a heart attack. · Your doctor may suggest a procedure to open narrowed or blocked arteries. This is called angioplasty. Or your doctor may suggest using healthy blood vessels to create detours around narrowed or blocked arteries. This is called bypass surgery. Follow-up care is a key part of your treatment and safety. Be sure to make and go to all appointments, and call your doctor if you are having problems. It's also a good idea to know your test results and keep a list of the medicines you take. Where can you learn more? Go to https://ERCOMpeThe New York Timeseweb.GameGround. org and sign in to your Kotak Urja account. Enter [phone] [id number] in the Dexmo box to learn more about \"Learning About Coronary Artery Disease (CAD). \"     If you do not have an account, please click on the \"Sign Up Now\" link. Current as of: August 31, 2020               Content Version: 12.8  © 2006-2021 [employer]. Care instructions adapted under license by [facility] ([facility]). If you have questions about a medical condition or this instruction, always ask your healthcare professional. [place] [address] any warranty or liability for your use of this information. Patient Education        DASH Diet: Care Instructions  Your Care Instructions     The DASH diet is an eating plan that can help lower your blood pressure. DASH stands for Dietary Approaches to Stop Hypertension. Hypertension is high blood pressure. The DASH diet focuses on eating foods that are high in calcium, potassium, and magnesium. These nutrients can lower blood pressure. The foods that are highest in these nutrients are fruits, vegetables, low-fat dairy products, nuts, seeds, and legumes. But taking calcium, potassium, and magnesium supplements instead of eating foods that are high in those nutrients does not have the same effect.  The DASH diet also includes whole grains, fish, and poultry. The DASH diet is one of several lifestyle changes your doctor may recommend to lower your high blood pressure. Your doctor may also want you to decrease the amount of sodium in your diet. Lowering sodium while following the DASH diet can lower blood pressure even further than just the DASH diet alone. Follow-up care is a key part of your treatment and safety. Be sure to make and go to all appointments, and call your doctor if you are having problems. It's also a good idea to know your test results and keep a list of the medicines you take. How can you care for yourself at home? Following the DASH diet  · Eat 4 to 5 servings of fruit each day. A serving is 1 medium-sized piece of fruit, ½ cup chopped or canned fruit, 1/4 cup dried fruit, or 4 ounces (½ cup) of fruit juice. Choose fruit more often than fruit juice. · Eat 4 to 5 servings of vegetables each day. A serving is 1 cup of lettuce or raw leafy vegetables, ½ cup of chopped or cooked vegetables, or 4 ounces (½ cup) of vegetable juice. Choose vegetables more often than vegetable juice. · Get 2 to 3 servings of low-fat and fat-free dairy each day. A serving is 8 ounces of milk, 1 cup of yogurt, or 1 ½ ounces of cheese. · Eat 6 to 8 servings of grains each day. A serving is 1 slice of bread, 1 ounce of dry cereal, or ½ cup of cooked rice, pasta, or cooked cereal. Try to choose whole-grain products as much as possible. · Limit lean meat, poultry, and fish to 2 servings each day. A serving is 3 ounces, about the size of a deck of cards. · Eat 4 to 5 servings of nuts, seeds, and legumes (cooked dried beans, lentils, and split peas) each week. A serving is 1/3 cup of nuts, 2 tablespoons of seeds, or ½ cup of cooked beans or peas. · Limit fats and oils to 2 to 3 servings each day. A serving is 1 teaspoon of vegetable oil or 2 tablespoons of salad dressing. · Limit sweets and added sugars to 5 servings or less a week.  A serving is 1 tablespoon jelly or jam, ½ cup sorbet, or 1 cup of lemonade. · Eat less than 2,300 milligrams (mg) of sodium a day. If you limit your sodium to 1,500 mg a day, you can lower your blood pressure even more. · Be aware that all of these are the suggested number of servings for people who eat 1,800 to 2,000 calories a day. Your recommended number of servings may be different if you need more or fewer calories. Tips for success  · Start small. Do not try to make dramatic changes to your diet all at once. You might feel that you are missing out on your favorite foods and then be more likely to not follow the plan. Make small changes, and stick with them. Once those changes become habit, add a few more changes. · Try some of the following:  ? Make it a goal to eat a fruit or vegetable at every meal and at snacks. This will make it easy to get the recommended amount of fruits and vegetables each day. ? Try yogurt topped with fruit and nuts for a snack or healthy dessert. ? Add lettuce, tomato, cucumber, and onion to sandwiches. ? Combine a ready-made pizza crust with low-fat mozzarella cheese and lots of vegetable toppings. Try using tomatoes, squash, spinach, broccoli, carrots, cauliflower, and onions. ? Have a variety of cut-up vegetables with a low-fat dip as an appetizer instead of chips and dip. ? Sprinkle sunflower seeds or chopped almonds over salads. Or try adding chopped walnuts or almonds to cooked vegetables. ? Try some vegetarian meals using beans and peas. Add garbanzo or kidney beans to salads. Make burritos and tacos with mashed edwards beans or black beans. Where can you learn more? Go to https://ScreenpeReologica Instruments.Swift Navigation. org and sign in to your [email or web address].IO account. Enter W503 in the GoSpotCheck box to learn more about \"DASH Diet: Care Instructions. \"     If you do not have an account, please click on the \"Sign Up Now\" link.   Current as of: August 31, 2020               Content Version: 12.8  © 3417-3641 Healthwise, Incorporated. Care instructions adapted under license by [facility] ([facility]). If you have questions about a medical condition or this instruction, always ask your healthcare professional. [address] any warranty or liability for your use of this information. Patient Education        High Cholesterol: Care Instructions  Your Care Instructions     Cholesterol is a type of fat in your blood. It is needed for many body functions, such as making new cells. Cholesterol is made by your body. It also comes from food you eat. High cholesterol means that you have too much of the fat in your blood. This raises your risk of a heart attack and stroke. LDL and HDL are part of your total cholesterol. LDL is the \"bad\" cholesterol. High LDL can raise your risk for heart disease, heart attack, and stroke. HDL is the \"good\" cholesterol. It helps clear bad cholesterol from the body. High HDL is linked with a lower risk of heart disease, heart attack, and stroke. Your cholesterol levels help your doctor find out your risk for having a heart attack or stroke. You and your doctor can talk about whether you need to lower your risk and what treatment is best for you. A heart-healthy lifestyle along with medicines can help lower your cholesterol and your risk. The way you choose to lower your risk will depend on how high your risk is for heart attack and stroke. It will also depend on how you feel about taking medicines. Follow-up care is a key part of your treatment and safety. Be sure to make and go to all appointments, and call your doctor if you are having problems. It's also a good idea to know your test results and keep a list of the medicines you take. How can you care for yourself at home? · Eat a variety of foods every day.  Good choices include fruits, vegetables, whole grains (like oatmeal), dried beans and peas, nuts and seeds, soy products (like tofu), and fat-free or low-fat dairy products. · Replace butter, margarine, and hydrogenated or partially hydrogenated oils with olive and canola oils. (Canola oil margarine without trans fat is fine.)  · Replace red meat with fish, poultry, and soy protein (like tofu). · Limit processed and packaged foods like chips, crackers, and cookies. · Bake, broil, or steam foods. Don't chavis them. · Be physically active. Get at least 30 minutes of exercise on most days of the week. Walking is a good choice. You also may want to do other activities, such as running, swimming, cycling, or playing tennis or team sports. · Stay at a healthy weight or lose weight by making the changes in eating and physical activity listed above. Losing just a small amount of weight, even 5 to 10 pounds, can reduce your risk for having a heart attack or stroke. · Do not smoke. When should you call for help? Watch closely for changes in your health, and be sure to contact your doctor if:    · You need help making lifestyle changes.     · You have questions about your medicine. Where can you learn more? Go to https://Dextrys.Edusoft. org and sign in to your CafÃ© Canusa account. Enter W564 in the Datamyne box to learn more about \"High Cholesterol: Care Instructions. \"     If you do not have an account, please click on the \"Sign Up Now\" link. Current as of: August 31, 2020               Content Version: 12.8  © [id number] Reverbeo. Care instructions adapted under license by [facility] ([facility]). If you have questions about a medical condition or this instruction, always ask your healthcare professional. [place] [address] any warranty or liability for your use of this information. Patient Education        Parkinson's Disease: Care Instructions  Overview  When you have Parkinson's disease, part of your brain cannot make enough dopamine, a chemical that helps control movement.  The disease can cause tremors, stiffness, and problems with movement. Severe or advanced cases can also cause problems with thinking. Taking your medicines correctly and getting regular exercise may help you maintain your quality of life. There are many things that can cause Parkinson's disease symptoms, including some medicine, some toxins, and trauma to the head. The cause in most cases is not known. Follow-up care is a key part of your treatment and safety. Be sure to make and go to all appointments, and call your doctor if you are having problems. It's also a good idea to know your test results and keep a list of the medicines you take. How can you care for yourself at home? General care  · Take your medicines exactly as prescribed. Call your doctor if you think you are having a problem with your medicine. · Make sure your home is safe:  ? Place furniture so that you have something to hold on to as you walk around the house. ? Use chairs that make it easier to sit down and stand up. ? Group the things you use most, such as reading glasses, keys, and the telephone, in one easy-to-reach place. ? Tack down rugs so that you do not trip. ? Put no-slip tape and handrails in the tub to prevent falls. · Use a cane, walker, or scooter if your doctor suggests it. · Keep up your normal activities as much as you can. · Find ways to manage stress, which can make symptoms worse. · Spend time with family and friends. Join a support group for people with Parkinson's disease if you want extra help. · Depression is common with this condition. Tell your doctor if you have trouble sleeping, are eating too much or are not hungry, or feel sad or tearful all the time. Depression can be treated with medicine and counseling. Diet and exercise  · Eat a balanced diet. · If you are taking levodopa, do not eat protein at the same time you take your medicine. Levodopa may not work as well if you take it at the same time you eat protein.  You can eat normal amounts of protein. Talk to your doctor if you have questions. · If you have problems swallowing, change how and what you eat:  ? Try thick drinks, such as milk shakes. They are easier to swallow than other fluids. ? Do not eat foods that crumble easily. These can cause choking. ? Use a  to prepare food. Soft foods need less chewing. ? Eat small meals often so that you do not get tired from eating heavy meals. · Drink plenty of water and eat a high-fiber diet to prevent constipation. Parkinson'sand the medicines that treat itmay slow your intestines. · Get exercise on most days. Work with your doctor to set up a program of walking, swimming, or other exercise you are able to do. When should you call for help? Call your doctor now or seek immediate medical care if:    · You have a change in your symptoms.     · You develop other problems from your condition, such as:  ? Injury from a fall. ? Thinking or memory problems. ? A urinary tract infection (burning pain when urinating). Watch closely for changes in your health, and be sure to contact your doctor if:    · You lose weight because of problems with eating.     · You want more information about your condition or your medicines. Where can you learn more? Go to https://SoundBetter.GLOBAL FOOD TECHNOLOGIES. org and sign in to your qLearning account. Enter G100 in the [facility] box to learn more about \"Parkinson's Disease: Care Instructions. \"     If you do not have an account, please click on the \"Sign Up Now\" link. Current as of: August 4, 2020               Content Version: 12.8  © 2006-2021 Healthwise, Livingly Media. Care instructions adapted under license by [facility] ([facility]). If you have questions about a medical condition or this instruction, always ask your healthcare professional. [place] [address] any warranty or liability for your use of this information.          Patient Education        Weakness: Care Instructions  Your Care Instructions     Weakness is a lack of physical or muscle strength. You may feel that you need to make extra effort to move your arms, legs, or other muscles. Generalized weakness means that you feel weak in most areas of your body. Another type of weakness may affect just one muscle or group of muscles. You may feel weak and tired after you have done too much activity, such as taking an extra-long hike. This is not a serious problem. It often goes away on its own. Feeling weak can also be caused by medical conditions like thyroid problems, depression, or a virus. Sometimes the cause can be serious. Your doctor may want to do more tests to try to find the cause of the weakness. The doctor has checked you carefully, but problems can develop later. If you notice any problems or new symptoms, get medical treatment right away. Follow-up care is a key part of your treatment and safety. Be sure to make and go to all appointments, and call your doctor if you are having problems. It's also a good idea to know your test results and keep a list of the medicines you take. How can you care for yourself at home? · Rest when you feel tired. · Be safe with medicines. If your doctor prescribed medicine, take it exactly as prescribed. Call your doctor if you think you are having a problem with your medicine. You will get more details on the specific medicines your doctor prescribes. · Do not skip meals. Eating a balanced diet may increase your energy level. · Get some physical activity every day, but do not get too tired. When should you call for help? Call your doctor now or seek immediate medical care if:    · You have new or worse weakness.     · You are dizzy or lightheaded, or you feel like you may faint. Watch closely for changes in your health, and be sure to contact your doctor if:    · You do not get better as expected. Where can you learn more? Go to https://chperodrigoeb.health-partners. org and sign in to

## 2021-03-12 NOTE — PROGRESS NOTES
Post-Discharge Transitional Care Management Services or Hospital Follow Up      Selvin Nesbitt   YOB: 1940    Date of Office Visit:  3/12/2021  Date of Hospital Admission:2/24/2021  Date of Hospital Discharge: 3/10/2021    Care management risk score Rising risk (score 2-5) and Complex Care (Scores >=6): 1     Non face to face  following discharge, date last encounter closed (first attempt may have been earlier): *No documented post hospital discharge outreach found in the last 14 days     Call initiated 2 business days of discharge: *No response recorded in the last 14 days    Patient Active Problem List   Diagnosis    COPD (chronic obstructive pulmonary disease)    Coronary artery disease    Hypertension    Hyperlipidemia    Fever    SOB (shortness of breath)    Vertigo    Exertional dyspnea    Paroxysmal atrial fibrillation (HCC)    [facility] disease ([facility]    Hypotension    Chest pain    Severe malnutrition ([facility]    NSTEMI (non-ST elevated myocardial infarction) ([facility]    Debility       No Known Allergies    Medications listed as ordered at the time of discharge from hospital  - See list below.     Medications marked \"taking\" at this time  Outpatient Medications Marked as Taking for the 3/12/21 encounter (Virtual Visit) with LEONORA Roth - CNP   Medication Sig Dispense Refill    apixaban (ELIQUIS) 2.5 MG TABS tablet Take by mouth 2 times daily      carbidopa-levodopa (SINEMET)  MG per tablet Take 1 tablet by mouth 3 times daily      amiodarone (CORDARONE) 200 MG tablet Take 200 mg by mouth daily      midodrine (PROAMATINE) 10 MG tablet Take 1 tablet by mouth 3 times daily (before meals) 90 tablet 3    Plavix 75 mg Once daily      Nitroglycerin (Nitrostat) 0.4 mg SL tablet every 5 minutes as needed      atorvastatin (LIPITOR) 20 MG tablet Take 1 tablet by mouth nightly 30 tablet 3        Medications patient taking as of now reconciled against medications ordered at time of hospital discharge: Yes    Chief Complaint   Patient presents with    New Patient    Follow-Up from Hospital     heart attack/ went to rehab        HPI    Inpatient course: Discharge summary reviewed- see chart. Interval history/Current status: Patient was hospitalized initially for MI. He completed intensive inpatient cardiac rehab, occupation, speech therapy last two weeks of hospitalization. Daughter requesting DME order for a wheelchair. Wheelchair delivered 16 x 18 is too large to fit though doorways. Requests same brand of wheelchair, DRIVE- good quality)    He has a follow up appointment scheduled with Cardiologist, Dr. Asa Rosales on 4/22/2021. There were no vitals filed for this visit. There is no height or weight on file to calculate BMI. Wt Readings from Last 3 Encounters:   02/02/21 125 lb 7.1 oz (56.9 kg)   01/15/21 121 lb 7.6 oz (55.1 kg)   01/09/21 119 lb 0.8 oz (54 kg)     BP Readings from Last 3 Encounters:   02/02/21 (!) 143/81   01/15/21 124/65   01/09/21 132/75         Review of Systems:  Gen: Denies fever, chills, headaches. Recent weight loss within the past several months- PEG tub inserted. Eating and drinking to baseline (tube feeding 4 times daily, he trying to increase daily calories in addition to tube feedings). CV:  Denies chest pain or tightness, palpitations. Pulm: Intermittent shortness of breath with exertion (no changes to baseline). Denies cough. Abd: Denies abdominal pain, change in bowel habits. Neuro: Reports increased hand tremors- Will follow up with Dr. Bridgette Rene (neurologist). Patient is ambulating with a walker with assistance (typically daughter and grandson assist together). Exam:  Respiratory rate appears normal  Constitutional: Appears tired and cachectic. He appears to be in no apparent distress    Mental status: Alert and awake. Oriented to person/place/time.  Able to follow commands    Neck: No visualized mass   Pulmonary/Chest: Respiratory effort normal.  No visualized signs of difficulty breathing or respiratory distress. Speaking in full sentences without difficulty. Musculoskeletal: Normal range of motion of neck  Neurological: No Facial Asymmetry (Cranial nerve 7 motor function) (limited exam to video visit). No gaze palsy       Skin: No significant exanthematous lesions or discoloration noted on facial skin       Psychiatric: Normal Affect. No Hallucinations         Assessment/Plan:  1. Other hyperlipidemia  - Stable. - Continue Lipitor 20 mg. Refilled on 1/9/2021, #30 with 3 refills. 2. Parkinson disease ([address]  - Stable. - Managed by Dr. Noy Jerez (neurology). - Continue Carbidopa-Levodopa (Sinemet)  mg, 1 tablet 3 times daily. 3. Paroxysmal atrial fibrillation (HCC)  - Stable. - Managed by Dr. Tina Weinberg (cardiology). - Keep scheduled appointment on 4/22/2021.  - Continue Amiodarone 200 mg daily. - Continue Eliquis 2.5 mg twice daily. 4. Coronary artery disease involving native coronary artery of native heart without angina pectoris  - Stable. - Managed by Dr. Tina Weinberg  - Continue Plavix 75 mg daily. - Continue Nitroglycerin (Nitrostat) 0.4 mg SL every 5 minutes as needed for chest pain    5. Debility  - DME Order for Wheel Chair as OP (size 16 x 16. Brand: DRIVE (with padded seat ordered)  Faxed to [address]: [phone], F: [phone]        Medical Decision Making: high complexity    Lula Hanks is a 79 y.o. male being evaluated by a Virtual Visit (video visit) encounter to address concerns as mentioned above. A caregiver was present when appropriate. Due to this being a TeleHealth encounter (During [facility] public health emergency), evaluation of the following organ systems was limited: Vitals/Constitutional/EENT/Resp/CV/GI//MS/Neuro/Skin/Heme-Lymph-Imm.   Pursuant to the emergency declaration under the [facility] [place] [address] and Response Supplemental Appropriations Act, this Virtual Visit was conducted with patient's (and/or legal guardian's) consent, to reduce the patient's risk of exposure to COVID-19 and provide necessary medical care. The patient (and/or legal guardian) has also been advised to contact this office for worsening conditions or problems, and seek emergency medical treatment and/or call 911 if deemed necessary. Patient identification was verified at the start of the visit: Yes    Total time spent on this encounter: 40 minutes. Services were provided through a video synchronous discussion virtually to substitute for in-person clinic visit. Patient was located in their home. Provider was located in the office. --LEONORA Weaver CNP on 3/12/2021 at 11:51 AM    An electronic signature was used to authenticate this note. Lonnie Prim

## 2021-03-12 NOTE — PROGRESS NOTES
[facility] Transitions Initial Follow Up Call    Outreach made within 2 business days of discharge: Yes    Patient: Kartik Alonzo Patient : 1940   MRN: [id number]  Reason for Admission: There are no discharge diagnoses documented for the most recent discharge. Discharge Date: 21       Spoke with: Arnoldo Rios    Discharge department/facility: [facility]     TCM Interactive Patient Contact:  Was patient able to fill all prescriptions: Yes  Was patient instructed to bring all medications to the follow-up visit: Yes  Is patient taking all medications as directed in the discharge summary?  Yes  Does patient understand their discharge instructions: Yes  Does patient have questions or concerns that need addressed prior to 7-14 day follow up office visit: no    Scheduled appointment with PCP within 7-14 days    Follow Up  Future Appointments   Date Time Provider Julien Castellon   3/12/2021 11:40 AM LEONORA Alexandre CNP [place] [facility]   2021  9:00 AM MD ALANNA Goodwin Cardio [facility]   2021 10:00 AM LEONORA Still CNP  HO[facility]

## 2021-03-15 ENCOUNTER — TELEPHONE (OUTPATIENT)
Dept: INTERNAL MEDICINE CLINIC | Age: 81
End: 2021-03-15

## 2021-03-18 ENCOUNTER — TELEPHONE (OUTPATIENT)
Dept: INTERNAL MEDICINE CLINIC | Age: 81
End: 2021-03-18

## 2021-03-18 NOTE — TELEPHONE ENCOUNTER
Danitza Postin, pt's daughter called. She reports she was tricked into switching to the CNP at the other practice. They thought that they were affiliated with the office. She is not happy with the response via previous message. She nor her Father want to change to another practice. He is comfortable here and has been a patient here. She reports that the nurse thinks he may have MRSA and she is concerned.  They would like to have pt come back to the practice to see Yovanny Salazar

## 2021-03-18 NOTE — TELEPHONE ENCOUNTER
Dakota informed through VM that the pt would probably be better off staying with [facility] since there are homecare orders involved, he already established with her as his PCP,  and it would be a bit before we could get him in with one of our providers. Also advised that if the orders needing signed are for his g-tube those would probably need to be signed by GI but that would be up to [facility].

## 2021-03-18 NOTE — TELEPHONE ENCOUNTER
Minh Griffith through VM that I am not sure where the mix up happened. Zak Guan is with [facility] but not with our office. Informed that Tina Carbajal is no longer taking Dr Radha Rausch pts. Roc Nix and Gia Rodriguez are booking further out won't be able to get them in right away. Informed Franki John that I had already told the nurse if the pt is having an issue with the g-tube they need to talk to the surgeon.

## 2021-03-22 ENCOUNTER — TELEPHONE (OUTPATIENT)
Dept: PRIMARY CARE CLINIC | Age: 81
End: 2021-03-22

## 2021-03-22 NOTE — TELEPHONE ENCOUNTER
Dakota called from [facility] on behalf of the patient. The patient used to see Dr. Beckie Morrison but suggested he see Dr. Sacha Galvan as the patient has already had a VV with [place] [facility]. They asked to see Dr. Sacha Galvan going forward as there are homecare papers that will need to be filled out. Patient is scheduled for 3/23 @ noon. Patient did fall out of his wheelchair and did want to get checked out with Dr. Sacha Galvan.

## 2021-03-29 ENCOUNTER — TELEPHONE (OUTPATIENT)
Dept: PRIMARY CARE CLINIC | Age: 81
End: 2021-03-29

## 2021-04-01 NOTE — PROGRESS NOTES
[address] Dawna Orozco [phone] was active with this pt prior to admit. Discharge planner notified. Will follow.

## 2021-04-12 ENCOUNTER — TELEPHONE (OUTPATIENT)
Dept: PRIMARY CARE CLINIC | Age: 81
End: 2021-04-12

## 2021-04-12 NOTE — TELEPHONE ENCOUNTER
He was supposed to be seen on March 23 and he never kept that appointment. He was seeing Dr. Tang Johnson for the therapy and maybe ask Dr. Tang Johnson to take care of all the paperwork as he should be seeing him for follow-up after discharge from the hospital for rehab.

## 2021-04-12 NOTE — TELEPHONE ENCOUNTER
Attempted to phone [address] unable to reach the ext left, also held for 7 mins for an  without success will try again at  Later date/time.

## 2021-04-12 NOTE — TELEPHONE ENCOUNTER
Sudha Burger called from [facility] to see if we received 3 forms to be filled out by Dr. Lloyd Hargrove. I told her we did receive the forms but I did not see where they have been filled out yet. She was hoping Dr. Lloyd Hargrove could fill those out soon as they can't do their billing without them. Sudha Burger can be reached @ [phone] ext [phone].

## 2021-04-19 NOTE — TELEPHONE ENCOUNTER
Called Burgess Remy back with [facility] with Dr Mala Cage response. She expressed understanding.

## 2021-04-21 ENCOUNTER — TELEPHONE (OUTPATIENT)
Dept: CARDIOLOGY CLINIC | Age: 81
End: 2021-04-21

## 2021-04-21 NOTE — TELEPHONE ENCOUNTER
Called to confirm appt for 4/21. Per wife Juice Meyers is in [place] and not doing well. Not heart related. She wanted to let LES know.   Thanks

## 2021-06-30 ENCOUNTER — TELEPHONE (OUTPATIENT)
Dept: PRIMARY CARE CLINIC | Age: 81
End: 2021-06-30

## 2023-03-30 NOTE — PLAN OF CARE
Fluid bolus ordered due to extreme orthostatic Bp w/ therapy, Parkinson's meds decreased. Using abd binder when up prn, keeps Bp from dropping.  Have been instructed by Dr. Garett Heath not to hold Coreg (asked when sBP was 95 prior) or midodrine (asked when sBP was 180 prior)     Problem: Falls - Risk of:  Goal: Will remain free from falls  Description: Will remain free from falls  Outcome: Ongoing  Goal: Absence of physical injury  Description: Absence of physical injury  Outcome: Ongoing     Problem: Skin Integrity:  Goal: Will show no infection signs and symptoms  Description: Will show no infection signs and symptoms  Outcome: Ongoing  Goal: Absence of new skin breakdown  Description: Absence of new skin breakdown  Outcome: Ongoing     Problem: Nutrition  Goal: Optimal nutrition therapy  Outcome: Ongoing Patient

## (undated) DEVICE — SET VLV 3 PC AWS DISPOSABLE GRDIAN SCOPEVALET

## (undated) DEVICE — BW-412T DISP COMBO CLEANING BRUSH: Brand: SINGLE USE COMBINATION CLEANING BRUSH

## (undated) DEVICE — PROCEDURE KIT ENDOSCP CUST

## (undated) DEVICE — FORCEPS BX L240CM WRK CHN 2.8MM STD CAP W/ NDL MIC MESH

## (undated) DEVICE — SOLUTION IV IRRIG WATER 500ML POUR BRL ST 2F7113